# Patient Record
Sex: MALE | Race: BLACK OR AFRICAN AMERICAN | NOT HISPANIC OR LATINO | Employment: OTHER | URBAN - METROPOLITAN AREA
[De-identification: names, ages, dates, MRNs, and addresses within clinical notes are randomized per-mention and may not be internally consistent; named-entity substitution may affect disease eponyms.]

---

## 2020-01-07 ENCOUNTER — HISTORICAL (OUTPATIENT)
Dept: ADMINISTRATIVE | Facility: HOSPITAL | Age: 65
End: 2020-01-07

## 2020-01-12 LAB
FINAL CULTURE: NORMAL
FINAL CULTURE: NORMAL

## 2020-01-14 LAB
FINAL CULTURE: NORMAL
GRAM STN SPEC: NORMAL

## 2022-04-10 ENCOUNTER — HISTORICAL (OUTPATIENT)
Dept: ADMINISTRATIVE | Facility: HOSPITAL | Age: 67
End: 2022-04-10

## 2022-04-29 VITALS
HEIGHT: 73 IN | BODY MASS INDEX: 33.6 KG/M2 | SYSTOLIC BLOOD PRESSURE: 120 MMHG | DIASTOLIC BLOOD PRESSURE: 80 MMHG | WEIGHT: 253.5 LBS

## 2023-03-20 DIAGNOSIS — C34.90 SQUAMOUS CELL CARCINOMA OF LUNG: Primary | ICD-10-CM

## 2023-03-24 ENCOUNTER — DOCUMENTATION ONLY (OUTPATIENT)
Dept: HEMATOLOGY/ONCOLOGY | Facility: CLINIC | Age: 68
End: 2023-03-24
Payer: MEDICARE

## 2023-03-24 NOTE — NURSING
I spoke to Deondre regarding his appointment on Monday with Dr. Silver. He is aware of his arrival time and our location. I explained who I was and my role at [facility], located within [facility].

## 2023-03-27 ENCOUNTER — CLINICAL SUPPORT (OUTPATIENT)
Dept: HEMATOLOGY/ONCOLOGY | Facility: CLINIC | Age: 68
End: 2023-03-27
Payer: MEDICARE

## 2023-03-27 ENCOUNTER — OFFICE VISIT (OUTPATIENT)
Dept: HEMATOLOGY/ONCOLOGY | Facility: CLINIC | Age: 68
End: 2023-03-27
Payer: MEDICARE

## 2023-03-27 VITALS
SYSTOLIC BLOOD PRESSURE: 142 MMHG | WEIGHT: 217.63 LBS | TEMPERATURE: 98 F | DIASTOLIC BLOOD PRESSURE: 90 MMHG | HEART RATE: 64 BPM | BODY MASS INDEX: 28.84 KG/M2 | RESPIRATION RATE: 18 BRPM | HEIGHT: 73 IN | OXYGEN SATURATION: 98 %

## 2023-03-27 DIAGNOSIS — F17.200 SMOKER: ICD-10-CM

## 2023-03-27 DIAGNOSIS — C34.90 MALIGNANT NEOPLASM OF UNSPECIFIED PART OF UNSPECIFIED BRONCHUS OR LUNG: ICD-10-CM

## 2023-03-27 DIAGNOSIS — C34.90 SQUAMOUS CELL CARCINOMA OF LUNG: ICD-10-CM

## 2023-03-27 DIAGNOSIS — K76.9 LIVER LESION: ICD-10-CM

## 2023-03-27 DIAGNOSIS — C34.01 LUNG CANCER, MAIN BRONCHUS, RIGHT: Primary | ICD-10-CM

## 2023-03-27 LAB
ALBUMIN SERPL-MCNC: 3.4 G/DL (ref 3.4–4.8)
ALBUMIN/GLOB SERPL: 0.8 RATIO (ref 1.1–2)
ALP SERPL-CCNC: 70 UNIT/L (ref 40–150)
ALT SERPL-CCNC: 6 UNIT/L (ref 0–55)
AST SERPL-CCNC: 7 UNIT/L (ref 5–34)
BASOPHILS # BLD AUTO: 0.01 X10(3)/MCL (ref 0–0.2)
BASOPHILS NFR BLD AUTO: 0.2 %
BILIRUBIN DIRECT+TOT PNL SERPL-MCNC: 0.6 MG/DL
BUN SERPL-MCNC: 8.7 MG/DL (ref 8.4–25.7)
CALCIUM SERPL-MCNC: 9.8 MG/DL (ref 8.8–10)
CHLORIDE SERPL-SCNC: 106 MMOL/L (ref 98–107)
CO2 SERPL-SCNC: 29 MMOL/L (ref 23–31)
CREAT SERPL-MCNC: 0.84 MG/DL (ref 0.73–1.18)
EOSINOPHIL # BLD AUTO: 0.09 X10(3)/MCL (ref 0–0.9)
EOSINOPHIL NFR BLD AUTO: 1.4 %
ERYTHROCYTE [DISTWIDTH] IN BLOOD BY AUTOMATED COUNT: 13.3 % (ref 11.5–17)
GFR SERPLBLD CREATININE-BSD FMLA CKD-EPI: >60 MLS/MIN/1.73/M2
GLOBULIN SER-MCNC: 4.1 GM/DL (ref 2.4–3.5)
GLUCOSE SERPL-MCNC: 85 MG/DL (ref 82–115)
HCT VFR BLD AUTO: 40.8 % (ref 42–52)
HGB BLD-MCNC: 12.8 G/DL (ref 14–18)
IMM GRANULOCYTES # BLD AUTO: 0.01 X10(3)/MCL (ref 0–0.04)
IMM GRANULOCYTES NFR BLD AUTO: 0.2 %
LYMPHOCYTES # BLD AUTO: 1.68 X10(3)/MCL (ref 0.6–4.6)
LYMPHOCYTES NFR BLD AUTO: 25.6 %
MCH RBC QN AUTO: 30.4 PG (ref 27–31)
MCHC RBC AUTO-ENTMCNC: 31.4 G/DL (ref 33–36)
MCV RBC AUTO: 96.9 FL (ref 80–94)
MONOCYTES # BLD AUTO: 0.68 X10(3)/MCL (ref 0.1–1.3)
MONOCYTES NFR BLD AUTO: 10.4 %
NEUTROPHILS # BLD AUTO: 4.08 X10(3)/MCL (ref 2.1–9.2)
NEUTROPHILS NFR BLD AUTO: 62.2 %
PLATELET # BLD AUTO: 237 X10(3)/MCL (ref 130–400)
PMV BLD AUTO: 8.3 FL (ref 7.4–10.4)
POTASSIUM SERPL-SCNC: 4.7 MMOL/L (ref 3.5–5.1)
PROT SERPL-MCNC: 7.5 GM/DL (ref 5.8–7.6)
RBC # BLD AUTO: 4.21 X10(6)/MCL (ref 4.7–6.1)
SODIUM SERPL-SCNC: 140 MMOL/L (ref 136–145)
WBC # SPEC AUTO: 6.6 X10(3)/MCL (ref 4.5–11.5)

## 2023-03-27 PROCEDURE — 80053 COMPREHEN METABOLIC PANEL: CPT | Performed by: INTERNAL MEDICINE

## 2023-03-27 PROCEDURE — 1126F AMNT PAIN NOTED NONE PRSNT: CPT | Mod: CPTII,S$GLB,, | Performed by: INTERNAL MEDICINE

## 2023-03-27 PROCEDURE — 4010F ACE/ARB THERAPY RXD/TAKEN: CPT | Mod: CPTII,S$GLB,, | Performed by: INTERNAL MEDICINE

## 2023-03-27 PROCEDURE — 1101F PR PT FALLS ASSESS DOC 0-1 FALLS W/OUT INJ PAST YR: ICD-10-PCS | Mod: CPTII,S$GLB,, | Performed by: INTERNAL MEDICINE

## 2023-03-27 PROCEDURE — 3080F PR MOST RECENT DIASTOLIC BLOOD PRESSURE >= 90 MM HG: ICD-10-PCS | Mod: CPTII,S$GLB,, | Performed by: INTERNAL MEDICINE

## 2023-03-27 PROCEDURE — 1126F PR PAIN SEVERITY QUANTIFIED, NO PAIN PRESENT: ICD-10-PCS | Mod: CPTII,S$GLB,, | Performed by: INTERNAL MEDICINE

## 2023-03-27 PROCEDURE — 99205 PR OFFICE/OUTPT VISIT, NEW, LEVL V, 60-74 MIN: ICD-10-PCS | Mod: S$GLB,,, | Performed by: INTERNAL MEDICINE

## 2023-03-27 PROCEDURE — 3077F SYST BP >= 140 MM HG: CPT | Mod: CPTII,S$GLB,, | Performed by: INTERNAL MEDICINE

## 2023-03-27 PROCEDURE — 3080F DIAST BP >= 90 MM HG: CPT | Mod: CPTII,S$GLB,, | Performed by: INTERNAL MEDICINE

## 2023-03-27 PROCEDURE — 1159F MED LIST DOCD IN RCRD: CPT | Mod: CPTII,S$GLB,, | Performed by: INTERNAL MEDICINE

## 2023-03-27 PROCEDURE — 1101F PT FALLS ASSESS-DOCD LE1/YR: CPT | Mod: CPTII,S$GLB,, | Performed by: INTERNAL MEDICINE

## 2023-03-27 PROCEDURE — 3288F PR FALLS RISK ASSESSMENT DOCUMENTED: ICD-10-PCS | Mod: CPTII,S$GLB,, | Performed by: INTERNAL MEDICINE

## 2023-03-27 PROCEDURE — 3077F PR MOST RECENT SYSTOLIC BLOOD PRESSURE >= 140 MM HG: ICD-10-PCS | Mod: CPTII,S$GLB,, | Performed by: INTERNAL MEDICINE

## 2023-03-27 PROCEDURE — 99999 PR PBB SHADOW E&M-EST. PATIENT-LVL V: CPT | Mod: PBBFAC,,, | Performed by: INTERNAL MEDICINE

## 2023-03-27 PROCEDURE — 3288F FALL RISK ASSESSMENT DOCD: CPT | Mod: CPTII,S$GLB,, | Performed by: INTERNAL MEDICINE

## 2023-03-27 PROCEDURE — 85025 COMPLETE CBC W/AUTO DIFF WBC: CPT | Performed by: INTERNAL MEDICINE

## 2023-03-27 PROCEDURE — 36415 COLL VENOUS BLD VENIPUNCTURE: CPT | Performed by: INTERNAL MEDICINE

## 2023-03-27 PROCEDURE — 99999 PR PBB SHADOW E&M-EST. PATIENT-LVL V: ICD-10-PCS | Mod: PBBFAC,,, | Performed by: INTERNAL MEDICINE

## 2023-03-27 PROCEDURE — 3008F BODY MASS INDEX DOCD: CPT | Mod: CPTII,S$GLB,, | Performed by: INTERNAL MEDICINE

## 2023-03-27 PROCEDURE — 4010F PR ACE/ARB THEARPY RXD/TAKEN: ICD-10-PCS | Mod: CPTII,S$GLB,, | Performed by: INTERNAL MEDICINE

## 2023-03-27 PROCEDURE — 3008F PR BODY MASS INDEX (BMI) DOCUMENTED: ICD-10-PCS | Mod: CPTII,S$GLB,, | Performed by: INTERNAL MEDICINE

## 2023-03-27 PROCEDURE — 99205 OFFICE O/P NEW HI 60 MIN: CPT | Mod: S$GLB,,, | Performed by: INTERNAL MEDICINE

## 2023-03-27 PROCEDURE — 1159F PR MEDICATION LIST DOCUMENTED IN MEDICAL RECORD: ICD-10-PCS | Mod: CPTII,S$GLB,, | Performed by: INTERNAL MEDICINE

## 2023-03-27 RX ORDER — PRAVASTATIN SODIUM 20 MG/1
20 TABLET ORAL DAILY
COMMUNITY

## 2023-03-27 RX ORDER — METOPROLOL TARTRATE 50 MG/1
50 TABLET ORAL 2 TIMES DAILY
COMMUNITY

## 2023-03-27 RX ORDER — GLIMEPIRIDE 2 MG/1
2 TABLET ORAL
COMMUNITY

## 2023-03-27 RX ORDER — DULAGLUTIDE 1.5 MG/.5ML
INJECTION, SOLUTION SUBCUTANEOUS
COMMUNITY
Start: 2023-03-03

## 2023-03-27 RX ORDER — LOSARTAN POTASSIUM 100 MG/1
100 TABLET ORAL DAILY
COMMUNITY

## 2023-03-27 RX ORDER — ASPIRIN 81 MG/1
81 TABLET ORAL DAILY
COMMUNITY

## 2023-03-27 NOTE — NURSING
I met with Deondre and his wife following his appointment with Dr. Silver. We discussed the plan of care.I provided them with my contact information and explained my role at [facility].

## 2023-03-27 NOTE — PROGRESS NOTES
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  The mass abuts the right main pulmonary artery and right main stem bronchus.  Subcentimeter mediastinal lymph node noted.  One of LN slightly hypermetabolic with SUV of 3 and is located laterally.  Patchy hypermetabolism within the liver as suspected, however, there are few tiny focal areas of hypermetabolism out of proportion to the rest of the liver.  At least 1 of this corresponds to a low-density focus seen in the noncontrast exam.    Pathology:  02/10/2023:  Right upper lobe lung needle biopsy: Scattered highly atypical squamous cells, suspicious for squamous cell carcinoma in a background of abundant acute inflammation and necrosis  Right upper lobe, brushing:  Positive for malignant cells.  Squamous cell carcinoma, moderately differentiated.  Right upper lobe biopsy squamous cell carcinoma, moderately differentiated with focal keratinization.  Right lung washings positive for malignant cells.  Squamous cell carcinoma in a background of abundant necrosis.    CLINICAL HISTORY:       Patient: Deondre Ocampo IV is a 67 y.o. male.  Kindly referred for newly diagnosed squamous cell carcinoma of the lung.    He reports that he was being evaluated for a right side pain went imaging studies showed a mass in the lung.  I do not have the CTs.  But he eventually underwent bronchoscopy that showed squamous cell carcinoma.  PET-CT showed the right suprahilar mass with possible mediastinal lymphadenopathy and questionable liver lesions.  He is here to discuss further recommendations.    He is here  with his wife.  Patient has history of 53 pack year smoking.  He reports that his breathing is fine.  He denies any chest pain, short of breath or coughing.  No hemoptysis.  He has an appointment to see the radiation oncologist next week.    Chief Complaint: Referral from Dr. Hoff for Lung cancer      Interval History:        Past Medical History:   Diagnosis Date    Diabetes mellitus     High cholesterol     Hypertension     Lung cancer       Past Surgical History:   Procedure Laterality Date    BACK SURGERY      DEBRIDEMENT TENNIS ELBOW Right     right arm       Family History   Problem Relation Age of Onset    Coronary artery disease Mother     No Known Problems Father     Coronary artery disease Brother     Cancer Brother     Diabetes Brother     Heart failure Brother     Diabetes Brother     Leukemia Brother     Leukemia Daughter      Social Connections: Not on file       Review of patient's allergies indicates:   Allergen Reactions    Ace inhibitors Swelling    Penicillins Itching and Rash      Current Outpatient Medications on File Prior to Visit   Medication Sig Dispense Refill    aspirin (ECOTRIN) 81 MG EC tablet Take 81 mg by mouth once daily.      glimepiride (AMARYL) 2 MG tablet Take 2 mg by mouth before breakfast.      losartan (COZAAR) 100 MG tablet Take 100 mg by mouth once daily.      metoprolol tartrate (LOPRESSOR) 50 MG tablet Take 50 mg by mouth 2 (two) times daily.      pravastatin (PRAVACHOL) 20 MG tablet Take 20 mg by mouth once daily.      TRULICITY 1.5 mg/0.5 mL pen injector SMARTSI pre-filled pen syringe SUB-Q Once a Week       No current facility-administered medications on file prior to visit.      Review of Systems   Constitutional:  Negative for activity change, appetite change, chills, diaphoresis, fatigue, fever and unexpected weight change.   HENT:  Negative for nasal congestion, mouth sores, nosebleeds, sinus pressure/congestion, sore throat and trouble swallowing.    Eyes:  "Negative.    Respiratory:  Negative for cough and shortness of breath.    Cardiovascular:  Negative for chest pain and palpitations.   Gastrointestinal:  Negative for abdominal distention, abdominal pain, blood in stool, change in bowel habit, constipation, diarrhea, nausea, vomiting and change in bowel habit.   Endocrine: Negative.    Genitourinary:  Negative for bladder incontinence, decreased urine volume, difficulty urinating, dysuria, frequency, hematuria and urgency.   Musculoskeletal:  Negative for arthralgias, back pain, gait problem, joint swelling, leg pain and myalgias.   Integumentary:  Negative for rash.   Allergic/Immunologic: Negative.    Neurological:  Negative for dizziness, tremors, syncope, weakness, light-headedness, numbness, headaches and memory loss.   Hematological:  Negative for adenopathy. Does not bruise/bleed easily.   Psychiatric/Behavioral:  Negative for agitation, confusion, hallucinations, sleep disturbance and suicidal ideas. The patient is not nervous/anxious.             Vitals:    03/27/23 1345   BP: (!) 142/90   BP Location: Left arm   Patient Position: Sitting   BP Method: Medium (Automatic)   Pulse: 64   Resp: 18   Temp: 98.2 °F (36.8 °C)   TempSrc: Oral   SpO2: 98%   Weight: 98.7 kg (217 lb 9.6 oz)   Height: 6' 1" (1.854 m)      Body mass index is 28.71 kg/m².  Body surface area is 2.25 meters squared.  Physical Exam  Vitals and nursing note reviewed.   Constitutional:       General: He is not in acute distress.     Appearance: Normal appearance.   HENT:      Head: Normocephalic and atraumatic.      Mouth/Throat:      Mouth: Mucous membranes are moist.   Eyes:      General: No scleral icterus.     Extraocular Movements: Extraocular movements intact.      Conjunctiva/sclera: Conjunctivae normal.   Neck:      Vascular: No JVD.   Cardiovascular:      Rate and Rhythm: Normal rate and regular rhythm.      Heart sounds: No murmur heard.  Pulmonary:      Effort: Pulmonary effort is " normal.      Breath sounds: Decreased breath sounds present. No wheezing or rhonchi.   Abdominal:      General: Bowel sounds are normal. There is no distension.      Palpations: Abdomen is soft.      Tenderness: There is no abdominal tenderness.   Musculoskeletal:         General: No swelling or deformity.      Cervical back: Neck supple.   Lymphadenopathy:      Head:      Right side of head: No submandibular adenopathy.      Left side of head: No submandibular adenopathy.      Cervical: No cervical adenopathy.      Upper Body:      Right upper body: No supraclavicular or axillary adenopathy.      Left upper body: No supraclavicular or axillary adenopathy.      Lower Body: No right inguinal adenopathy. No left inguinal adenopathy.   Skin:     General: Skin is warm.      Coloration: Skin is not jaundiced.      Findings: No rash.   Neurological:      General: No focal deficit present.      Mental Status: He is alert and oriented to person, place, and time.      Cranial Nerves: Cranial nerves 2-12 are intact.   Psychiatric:         Attention and Perception: Attention normal.         Behavior: Behavior is cooperative.     ECOG SCORE    1 - Restricted in strenuous activity-ambulatory and able to carry out work of a light nature         Laboratory:  CBC with Differential:  No results found for: WBC, RBC, HGB, HCT, MCV, MCH, MCHC, RDW, PLT, MPV, GRAN, LYMPH, MONO, EOS, BASO, EOSINOPHIL, BASOPHIL     CMP:  No results found for: NA, K, CL, CO2, GLU, BUN, CREATININE, CALCIUM, PROT, ALBUMIN, BILITOT, ALKPHOS, AST, ALT, ANIONGAP, ESTGFRAFRICA, EGFRNONAA          Assessment:       1. Lung cancer, main bronchus, right    2. Liver lesion    3. Squamous cell carcinoma of lung              Plan:       Discussed with the patient and his wife since diagnosis, prognosis and treatment recommendations.  At this time staging unclear but seems that he has advanced disease.  Mass 4.7 cm, possible mediastinal lymphadenopathy and questionable  liver hypodensities.  Discussed with the patient chemotherapy, chemoradiation risks versus benefits as well as toxicities associated with it.  Patient is willing to proceed with treatment as recommended.  He has an appointment next week with radiation oncologist.  He has not seen the surgeon though.  He will refer to CT surgeon for surgical evaluation and more tissue diagnosis and molecular markers.   Plan: Carbo/Taxol weekly with RT    Keep appointment with radiation oncologist April 4th  Will refer to Cardiothoracic surgeon for evaluation of mediastinal LN and possible VATS and to send tissue for molecular testing and Mediport placement  MRI liver to eval questionable liver hypodensities  MRI brain to complete staging  Valium 2 mg 30 min prior to MRI will be prescribed as patient claustrophobic   RTC 2 weeks-after all above for more definite recommendations.  Smoking cessation counseling given.   The patient was seen, interviewed and examined. Pertinent lab and radiology studies were reviewed.   The patient was given ample opportunity to ask questions, and to the best of my abilities, all questions answered to satisfaction; patient demonstrated understanding of what we discussed and agreeable to the plan. Pt instructed to call should develop concerning signs/symptoms or have further questions.     I'd like to thank for referring and allowing me the opportunity to participate in the care of this patient and if any questions, please do not hesitate to call the office at [phone].         Letha Silver MD  Hematology/Oncology

## 2023-04-05 ENCOUNTER — TELEPHONE (OUTPATIENT)
Dept: HEMATOLOGY/ONCOLOGY | Facility: CLINIC | Age: 68
End: 2023-04-05
Payer: MEDICARE

## 2023-04-05 DIAGNOSIS — C34.01 LUNG CANCER, MAIN BRONCHUS, RIGHT: Primary | ICD-10-CM

## 2023-04-05 RX ORDER — LORAZEPAM 0.5 MG/1
0.5 TABLET ORAL ONCE
Qty: 2 TABLET | Refills: 0 | Status: SHIPPED | OUTPATIENT
Start: 2023-04-05 | End: 2023-04-19

## 2023-04-05 NOTE — TELEPHONE ENCOUNTER
Leticia -     Never mind. I just received a call from [facility] imaging. They are not able to perform MRI due to TENS unit. The radiologist asked that alternative tests be ordered.

## 2023-04-06 DIAGNOSIS — C34.90 SQUAMOUS CELL CARCINOMA OF LUNG, UNSPECIFIED LATERALITY: Primary | ICD-10-CM

## 2023-04-10 ENCOUNTER — APPOINTMENT (OUTPATIENT)
Dept: RADIATION THERAPY | Facility: HOSPITAL | Age: 68
End: 2023-04-10
Attending: RADIOLOGY
Payer: MEDICARE

## 2023-04-10 DIAGNOSIS — C34.01 LUNG CANCER, MAIN BRONCHUS, RIGHT: ICD-10-CM

## 2023-04-10 DIAGNOSIS — K76.9 LIVER LESION: ICD-10-CM

## 2023-04-10 DIAGNOSIS — C34.91 SQUAMOUS CELL CARCINOMA OF RIGHT LUNG: Primary | ICD-10-CM

## 2023-04-10 PROCEDURE — 77334 RADIATION TREATMENT AID(S): CPT | Performed by: RADIOLOGY

## 2023-04-17 ENCOUNTER — TELEPHONE (OUTPATIENT)
Dept: HEMATOLOGY/ONCOLOGY | Facility: CLINIC | Age: 68
End: 2023-04-17
Payer: MEDICARE

## 2023-04-17 NOTE — TELEPHONE ENCOUNTER
I cancelled patient's appointment this week with Dr. Silver. Looks like she put in recent orders for CT scans. Please message schedulers to reschedule visit and lab once the Ct scan appointments are made.    Thank you, Ladies.

## 2023-04-19 ENCOUNTER — OFFICE VISIT (OUTPATIENT)
Dept: CARDIAC SURGERY | Facility: CLINIC | Age: 68
End: 2023-04-19
Payer: MEDICARE

## 2023-04-19 VITALS
BODY MASS INDEX: 28.76 KG/M2 | HEIGHT: 73 IN | HEART RATE: 69 BPM | WEIGHT: 217 LBS | SYSTOLIC BLOOD PRESSURE: 154 MMHG | DIASTOLIC BLOOD PRESSURE: 98 MMHG

## 2023-04-19 DIAGNOSIS — C34.91 SQUAMOUS CELL CARCINOMA OF RIGHT LUNG: ICD-10-CM

## 2023-04-19 PROCEDURE — 3288F FALL RISK ASSESSMENT DOCD: CPT | Mod: CPTII,,, | Performed by: THORACIC SURGERY (CARDIOTHORACIC VASCULAR SURGERY)

## 2023-04-19 PROCEDURE — 3008F BODY MASS INDEX DOCD: CPT | Mod: CPTII,,, | Performed by: THORACIC SURGERY (CARDIOTHORACIC VASCULAR SURGERY)

## 2023-04-19 PROCEDURE — 3288F PR FALLS RISK ASSESSMENT DOCUMENTED: ICD-10-PCS | Mod: CPTII,,, | Performed by: THORACIC SURGERY (CARDIOTHORACIC VASCULAR SURGERY)

## 2023-04-19 PROCEDURE — 3080F DIAST BP >= 90 MM HG: CPT | Mod: CPTII,,, | Performed by: THORACIC SURGERY (CARDIOTHORACIC VASCULAR SURGERY)

## 2023-04-19 PROCEDURE — 1160F PR REVIEW ALL MEDS BY PRESCRIBER/CLIN PHARMACIST DOCUMENTED: ICD-10-PCS | Mod: CPTII,,, | Performed by: THORACIC SURGERY (CARDIOTHORACIC VASCULAR SURGERY)

## 2023-04-19 PROCEDURE — 1159F PR MEDICATION LIST DOCUMENTED IN MEDICAL RECORD: ICD-10-PCS | Mod: CPTII,,, | Performed by: THORACIC SURGERY (CARDIOTHORACIC VASCULAR SURGERY)

## 2023-04-19 PROCEDURE — 3008F PR BODY MASS INDEX (BMI) DOCUMENTED: ICD-10-PCS | Mod: CPTII,,, | Performed by: THORACIC SURGERY (CARDIOTHORACIC VASCULAR SURGERY)

## 2023-04-19 PROCEDURE — 3080F PR MOST RECENT DIASTOLIC BLOOD PRESSURE >= 90 MM HG: ICD-10-PCS | Mod: CPTII,,, | Performed by: THORACIC SURGERY (CARDIOTHORACIC VASCULAR SURGERY)

## 2023-04-19 PROCEDURE — 1160F RVW MEDS BY RX/DR IN RCRD: CPT | Mod: CPTII,,, | Performed by: THORACIC SURGERY (CARDIOTHORACIC VASCULAR SURGERY)

## 2023-04-19 PROCEDURE — 1159F MED LIST DOCD IN RCRD: CPT | Mod: CPTII,,, | Performed by: THORACIC SURGERY (CARDIOTHORACIC VASCULAR SURGERY)

## 2023-04-19 PROCEDURE — 1101F PT FALLS ASSESS-DOCD LE1/YR: CPT | Mod: CPTII,,, | Performed by: THORACIC SURGERY (CARDIOTHORACIC VASCULAR SURGERY)

## 2023-04-19 PROCEDURE — 3077F PR MOST RECENT SYSTOLIC BLOOD PRESSURE >= 140 MM HG: ICD-10-PCS | Mod: CPTII,,, | Performed by: THORACIC SURGERY (CARDIOTHORACIC VASCULAR SURGERY)

## 2023-04-19 PROCEDURE — 99204 PR OFFICE/OUTPT VISIT, NEW, LEVL IV, 45-59 MIN: ICD-10-PCS | Mod: ,,, | Performed by: THORACIC SURGERY (CARDIOTHORACIC VASCULAR SURGERY)

## 2023-04-19 PROCEDURE — 99204 OFFICE O/P NEW MOD 45 MIN: CPT | Mod: ,,, | Performed by: THORACIC SURGERY (CARDIOTHORACIC VASCULAR SURGERY)

## 2023-04-19 PROCEDURE — 1101F PR PT FALLS ASSESS DOC 0-1 FALLS W/OUT INJ PAST YR: ICD-10-PCS | Mod: CPTII,,, | Performed by: THORACIC SURGERY (CARDIOTHORACIC VASCULAR SURGERY)

## 2023-04-19 PROCEDURE — 3077F SYST BP >= 140 MM HG: CPT | Mod: CPTII,,, | Performed by: THORACIC SURGERY (CARDIOTHORACIC VASCULAR SURGERY)

## 2023-04-19 PROCEDURE — 4010F PR ACE/ARB THEARPY RXD/TAKEN: ICD-10-PCS | Mod: CPTII,,, | Performed by: THORACIC SURGERY (CARDIOTHORACIC VASCULAR SURGERY)

## 2023-04-19 PROCEDURE — 4010F ACE/ARB THERAPY RXD/TAKEN: CPT | Mod: CPTII,,, | Performed by: THORACIC SURGERY (CARDIOTHORACIC VASCULAR SURGERY)

## 2023-04-19 NOTE — PROGRESS NOTES
History & Physical    SUBJECTIVE:     History of Present Illness:  The patient is presenting for evaluation for right lung mass with diagnosis of squamous cell carcinoma.  He has been evaluated with a PET scan and a CT scan.  Findings are compatible on the PET scan of T2 N2 lung tumor.  He did have some uptake in the liver that were thought to be cysts versus possible metastatic disease.  He is here for possible intervention for more tissues for genetic testing.      Chief Complaint   Patient presents with    Pre-op Exam      RE: Squamous Cell Ca       Review of patient's allergies indicates:   Allergen Reactions    Ace inhibitors Swelling    Penicillins Itching and Rash       Current Outpatient Medications   Medication Sig Dispense Refill    aspirin (ECOTRIN) 81 MG EC tablet Take 81 mg by mouth once daily.      glimepiride (AMARYL) 2 MG tablet Take 2 mg by mouth before breakfast.      losartan (COZAAR) 100 MG tablet Take 100 mg by mouth once daily.      metoprolol tartrate (LOPRESSOR) 50 MG tablet Take 50 mg by mouth 2 (two) times daily.      pravastatin (PRAVACHOL) 20 MG tablet Take 20 mg by mouth once daily.      TRULICITY 1.5 mg/0.5 mL pen injector SMARTSI pre-filled pen syringe SUB-Q Once a Week       No current facility-administered medications for this visit.       Past Medical History:   Diagnosis Date    Diabetes mellitus     High cholesterol     Hypertension     Lung cancer      Past Surgical History:   Procedure Laterality Date    BACK SURGERY      DEBRIDEMENT TENNIS ELBOW Right     right arm       Family History   Problem Relation Age of Onset    Coronary artery disease Mother     No Known Problems Father     Coronary artery disease Brother     Cancer Brother     Diabetes Brother     Heart failure Brother     Diabetes Brother     Leukemia Brother     Leukemia Daughter      Social History     Tobacco Use    Smoking status: Former     Years: 53.00     Types: Cigarettes     Start date:       "Quit date: 2023     Years since quittin.1    Smokeless tobacco: Never   Substance Use Topics    Alcohol use: Yes     Comment: ocassional    Drug use: Never        Review of Systems:  Review of Systems   Constitutional: Negative.    HENT: Negative.     Eyes: Negative.    Respiratory: Negative.     Cardiovascular: Negative.    Gastrointestinal: Negative.    Endocrine: Negative.    Genitourinary: Negative.    Musculoskeletal: Negative.         Claudications   Skin: Negative.    Allergic/Immunologic: Negative.    Neurological: Negative.    Hematological: Negative.    Psychiatric/Behavioral: Negative.       OBJECTIVE:     Vital Signs (Most Recent)  Pulse: 69 (23 0945)  BP: (!) 154/98 (23 0945)  6' 1" (1.854 m)  98.4 kg (217 lb)     Physical Exam:  Physical Exam  Vitals reviewed.   Constitutional:       Appearance: Normal appearance.   HENT:      Head: Normocephalic and atraumatic.      Nose: Nose normal.      Mouth/Throat:      Mouth: Mucous membranes are dry.      Pharynx: Oropharynx is clear.   Eyes:      Extraocular Movements: Extraocular movements intact.      Conjunctiva/sclera: Conjunctivae normal.      Pupils: Pupils are equal, round, and reactive to light.   Cardiovascular:      Rate and Rhythm: Normal rate and regular rhythm.      Pulses: Normal pulses.   Pulmonary:      Effort: Pulmonary effort is normal.      Breath sounds: Normal breath sounds.   Abdominal:      General: Abdomen is flat.      Palpations: Abdomen is soft.   Musculoskeletal:         General: Normal range of motion.      Cervical back: Neck supple.   Skin:     General: Skin is warm and dry.   Neurological:      General: No focal deficit present.   Psychiatric:         Mood and Affect: Mood normal.       Laboratory:  None      Diagnostic Results:  CT PET was reviewed.      ASSESSMENT/PLAN:     Squamous cell carcinoma of the lung.  The patient might be still surgical and can be treated with a right pneumonectomy as I believe his " nodes are more likely to be N1 rather than N2.  I plan to get a CT scan of the chest for further evaluation.  Of course we will be left with a question of the hepatic finding and we will need to rule out Mets prior to any major planning.  I will discuss this case with Dr. Silver.  I will get pulmonary function tests and cardiac clearance.

## 2023-04-20 ENCOUNTER — PROCEDURE VISIT (OUTPATIENT)
Dept: RESPIRATORY THERAPY | Facility: HOSPITAL | Age: 68
End: 2023-04-20
Attending: THORACIC SURGERY (CARDIOTHORACIC VASCULAR SURGERY)
Payer: MEDICARE

## 2023-04-20 VITALS — HEART RATE: 87 BPM | OXYGEN SATURATION: 97 %

## 2023-04-20 DIAGNOSIS — C34.91 SQUAMOUS CELL CARCINOMA OF RIGHT LUNG: Primary | ICD-10-CM

## 2023-04-20 PROCEDURE — 94761 N-INVAS EAR/PLS OXIMETRY MLT: CPT

## 2023-04-20 PROCEDURE — 94727 GAS DIL/WSHOT DETER LNG VOL: CPT

## 2023-04-20 PROCEDURE — 94060 EVALUATION OF WHEEZING: CPT

## 2023-04-20 PROCEDURE — 25000242 PHARM REV CODE 250 ALT 637 W/ HCPCS: Performed by: INTERNAL MEDICINE

## 2023-04-20 PROCEDURE — 94729 DIFFUSING CAPACITY: CPT

## 2023-04-20 RX ORDER — ALBUTEROL SULFATE 0.83 MG/ML
2.5 SOLUTION RESPIRATORY (INHALATION)
Status: COMPLETED | OUTPATIENT
Start: 2023-04-20 | End: 2023-04-20

## 2023-04-20 RX ADMIN — ALBUTEROL SULFATE 2.5 MG: 2.5 SOLUTION RESPIRATORY (INHALATION) at 01:04

## 2023-04-25 ENCOUNTER — PATIENT MESSAGE (OUTPATIENT)
Dept: RESEARCH | Facility: HOSPITAL | Age: 68
End: 2023-04-25
Payer: MEDICARE

## 2023-05-02 ENCOUNTER — PATIENT MESSAGE (OUTPATIENT)
Dept: RESEARCH | Facility: HOSPITAL | Age: 68
End: 2023-05-02
Payer: MEDICARE

## 2023-05-12 ENCOUNTER — HOSPITAL ENCOUNTER (OUTPATIENT)
Dept: RADIOLOGY | Facility: HOSPITAL | Age: 68
Discharge: HOME | End: 2023-05-12
Attending: THORACIC SURGERY (CARDIOTHORACIC VASCULAR SURGERY)
Payer: MEDICARE

## 2023-05-12 DIAGNOSIS — C34.91 SQUAMOUS CELL CARCINOMA OF RIGHT LUNG: ICD-10-CM

## 2023-05-12 LAB
CREAT SERPL-MCNC: 0.8 MG/DL (ref 0.5–1.4)
SAMPLE: NORMAL

## 2023-05-12 PROCEDURE — 71260 CT THORAX DX C+: CPT | Mod: TC

## 2023-05-12 PROCEDURE — 25500020 PHARM REV CODE 255: Performed by: THORACIC SURGERY (CARDIOTHORACIC VASCULAR SURGERY)

## 2023-05-12 PROCEDURE — 74177 CT ABD & PELVIS W/CONTRAST: CPT | Mod: TC

## 2023-05-12 RX ADMIN — IOPAMIDOL 100 ML: 755 INJECTION, SOLUTION INTRAVENOUS at 08:05

## 2023-05-16 ENCOUNTER — PATIENT MESSAGE (OUTPATIENT)
Dept: RESEARCH | Facility: HOSPITAL | Age: 68
End: 2023-05-16
Payer: MEDICARE

## 2023-05-18 ENCOUNTER — TELEPHONE (OUTPATIENT)
Dept: CARDIAC SURGERY | Facility: CLINIC | Age: 68
End: 2023-05-18
Payer: MEDICARE

## 2023-05-18 NOTE — TELEPHONE ENCOUNTER
Informed spouse that Dr. Chand is out this week and will be back next week to review testing results and I will call back then.  Verb understanding.   ----- Message from Chica Cardenas sent at 5/18/2023  2:03 PM CDT -----  Pt's wife called wanting to know if we got back the test results for her .    Jeannine(wife) [phone]

## 2023-05-19 ENCOUNTER — TELEPHONE (OUTPATIENT)
Dept: HEMATOLOGY/ONCOLOGY | Facility: CLINIC | Age: 68
End: 2023-05-19
Payer: MEDICARE

## 2023-05-19 NOTE — TELEPHONE ENCOUNTER
Candy with Dr. Rushing's office calling to check on status of this patient. They are waiting for tx plan so that they can get started with XRT. He recently had CT scans, but no f/u visit.

## 2023-05-19 NOTE — TELEPHONE ENCOUNTER
SPOKE WITH URIEL AND INFORMED HER OF HIS APPT ON 5/24 WITH MD AND ADVISED HER I'D CALL HER WITH MD DECIDES, SHE STATED UNDERSTANDING.

## 2023-05-24 ENCOUNTER — OFFICE VISIT (OUTPATIENT)
Dept: HEMATOLOGY/ONCOLOGY | Facility: CLINIC | Age: 68
End: 2023-05-24
Payer: MEDICARE

## 2023-05-24 VITALS
TEMPERATURE: 98 F | DIASTOLIC BLOOD PRESSURE: 93 MMHG | HEIGHT: 73 IN | SYSTOLIC BLOOD PRESSURE: 143 MMHG | HEART RATE: 69 BPM | OXYGEN SATURATION: 100 % | BODY MASS INDEX: 28.63 KG/M2 | WEIGHT: 216 LBS

## 2023-05-24 DIAGNOSIS — K76.9 LIVER LESION: ICD-10-CM

## 2023-05-24 DIAGNOSIS — M54.2 NECK PAIN: ICD-10-CM

## 2023-05-24 DIAGNOSIS — C34.01 LUNG CANCER, MAIN BRONCHUS, RIGHT: Primary | ICD-10-CM

## 2023-05-24 PROCEDURE — 4010F ACE/ARB THERAPY RXD/TAKEN: CPT | Mod: CPTII,S$GLB,, | Performed by: INTERNAL MEDICINE

## 2023-05-24 PROCEDURE — 1125F PR PAIN SEVERITY QUANTIFIED, PAIN PRESENT: ICD-10-PCS | Mod: CPTII,S$GLB,, | Performed by: INTERNAL MEDICINE

## 2023-05-24 PROCEDURE — 1101F PT FALLS ASSESS-DOCD LE1/YR: CPT | Mod: CPTII,S$GLB,, | Performed by: INTERNAL MEDICINE

## 2023-05-24 PROCEDURE — 1159F MED LIST DOCD IN RCRD: CPT | Mod: CPTII,S$GLB,, | Performed by: INTERNAL MEDICINE

## 2023-05-24 PROCEDURE — 3080F DIAST BP >= 90 MM HG: CPT | Mod: CPTII,S$GLB,, | Performed by: INTERNAL MEDICINE

## 2023-05-24 PROCEDURE — 3008F PR BODY MASS INDEX (BMI) DOCUMENTED: ICD-10-PCS | Mod: CPTII,S$GLB,, | Performed by: INTERNAL MEDICINE

## 2023-05-24 PROCEDURE — 3288F PR FALLS RISK ASSESSMENT DOCUMENTED: ICD-10-PCS | Mod: CPTII,S$GLB,, | Performed by: INTERNAL MEDICINE

## 2023-05-24 PROCEDURE — 1101F PR PT FALLS ASSESS DOC 0-1 FALLS W/OUT INJ PAST YR: ICD-10-PCS | Mod: CPTII,S$GLB,, | Performed by: INTERNAL MEDICINE

## 2023-05-24 PROCEDURE — 99999 PR PBB SHADOW E&M-EST. PATIENT-LVL IV: ICD-10-PCS | Mod: PBBFAC,,, | Performed by: INTERNAL MEDICINE

## 2023-05-24 PROCEDURE — 4010F PR ACE/ARB THEARPY RXD/TAKEN: ICD-10-PCS | Mod: CPTII,S$GLB,, | Performed by: INTERNAL MEDICINE

## 2023-05-24 PROCEDURE — 99215 PR OFFICE/OUTPT VISIT, EST, LEVL V, 40-54 MIN: ICD-10-PCS | Mod: S$GLB,,, | Performed by: INTERNAL MEDICINE

## 2023-05-24 PROCEDURE — 1159F PR MEDICATION LIST DOCUMENTED IN MEDICAL RECORD: ICD-10-PCS | Mod: CPTII,S$GLB,, | Performed by: INTERNAL MEDICINE

## 2023-05-24 PROCEDURE — 1125F AMNT PAIN NOTED PAIN PRSNT: CPT | Mod: CPTII,S$GLB,, | Performed by: INTERNAL MEDICINE

## 2023-05-24 PROCEDURE — 3008F BODY MASS INDEX DOCD: CPT | Mod: CPTII,S$GLB,, | Performed by: INTERNAL MEDICINE

## 2023-05-24 PROCEDURE — 3288F FALL RISK ASSESSMENT DOCD: CPT | Mod: CPTII,S$GLB,, | Performed by: INTERNAL MEDICINE

## 2023-05-24 PROCEDURE — 99999 PR PBB SHADOW E&M-EST. PATIENT-LVL IV: CPT | Mod: PBBFAC,,, | Performed by: INTERNAL MEDICINE

## 2023-05-24 PROCEDURE — 3080F PR MOST RECENT DIASTOLIC BLOOD PRESSURE >= 90 MM HG: ICD-10-PCS | Mod: CPTII,S$GLB,, | Performed by: INTERNAL MEDICINE

## 2023-05-24 PROCEDURE — 3077F SYST BP >= 140 MM HG: CPT | Mod: CPTII,S$GLB,, | Performed by: INTERNAL MEDICINE

## 2023-05-24 PROCEDURE — 3077F PR MOST RECENT SYSTOLIC BLOOD PRESSURE >= 140 MM HG: ICD-10-PCS | Mod: CPTII,S$GLB,, | Performed by: INTERNAL MEDICINE

## 2023-05-24 PROCEDURE — 99215 OFFICE O/P EST HI 40 MIN: CPT | Mod: S$GLB,,, | Performed by: INTERNAL MEDICINE

## 2023-05-24 RX ORDER — SODIUM CHLORIDE 0.9 % (FLUSH) 0.9 %
10 SYRINGE (ML) INJECTION
Status: CANCELLED | OUTPATIENT
Start: 2023-05-31

## 2023-05-24 RX ORDER — EPINEPHRINE 0.3 MG/.3ML
0.3 INJECTION SUBCUTANEOUS ONCE AS NEEDED
Status: CANCELLED | OUTPATIENT
Start: 2023-05-31

## 2023-05-24 RX ORDER — FAMOTIDINE 10 MG/ML
20 INJECTION INTRAVENOUS
Status: CANCELLED | OUTPATIENT
Start: 2023-05-31

## 2023-05-24 RX ORDER — HEPARIN 100 UNIT/ML
500 SYRINGE INTRAVENOUS
Status: CANCELLED | OUTPATIENT
Start: 2023-05-31

## 2023-05-24 RX ORDER — DIPHENHYDRAMINE HYDROCHLORIDE 50 MG/ML
50 INJECTION INTRAMUSCULAR; INTRAVENOUS ONCE AS NEEDED
Status: CANCELLED | OUTPATIENT
Start: 2023-05-31

## 2023-05-24 RX ORDER — DICYCLOMINE HYDROCHLORIDE 20 MG/1
20 TABLET ORAL DAILY
COMMUNITY

## 2023-05-24 NOTE — PROGRESS NOTES
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  The mass abuts the right main pulmonary artery and right main stem bronchus.  Subcentimeter mediastinal lymph node noted.  One of LN slightly hypermetabolic with SUV of 3 and is located laterally.  Patchy hypermetabolism within the liver as suspected, however, there are few tiny focal areas of hypermetabolism out of proportion to the rest of the liver.  At least 1 of this corresponds to a low-density focus seen in the noncontrast exam.  CT 5/12/2023: changes consistent with COPD and emphysema in the lungs bilaterally with multiple bulla bleb seen. mass seen in the right hilum extending into the right upper lobe.  It is somewhat infiltrative.  This was seen on the prior PET-CT as well.  The findings are not significantly changed.  There are multiple associated satellite nodules in the right upper lobe.  Rough dimensions of the hilar component of the mass is 5.4 x 5.1 cm and rough measurements of the dominant lesion in the right upper lobe is 11 cm x 3.6 cm.  The mass extends into the right mainstem and right upper lobe bronchus.  This was seen on the prior examination as well.  There is narrowing of the right upper lobe bronchus there is some narrowing of the right upper lobe pulmonary arteries.  Findings are similar to the prior examination.  Scattered areas of punctate subcentimeter nodularity is seen in the right lower lobe.  These are too small to characterize and similar changes were seen previously.    No  pleural effusion is seen.  Thoracic aorta is normal in caliber.  There is some mediastinal lymphadenopathy seen.  Reference lymph node is measured in the precarinal region on image number 39 series 2 at 1.5 x 0.9 cm.  This is similar to the prior examination.  Scattered punctate areas of hypoattenuation are seen throughout the liver.  Similar changes were seen previously.  The lesions are too small to characterize but may represent cysts.  The largest lesion is seen in segment 4.  It does not enhance and measures 2 cm x 1.5 cm.  Findings may represent a cyst.  No adrenal nodules are seen.      Pathology:  02/10/2023:  Right upper lobe lung needle biopsy: Scattered highly atypical squamous cells, suspicious for squamous cell carcinoma in a background of abundant acute inflammation and necrosis  Right upper lobe, brushing:  Positive for malignant cells.  Squamous cell carcinoma, moderately differentiated.  Right upper lobe biopsy squamous cell carcinoma, moderately differentiated with focal keratinization.  Right lung washings positive for malignant cells.  Squamous cell carcinoma in a background of abundant necrosis.    CLINICAL HISTORY:       Patient: Deondre Ocampo IV is a 67 y.o. male.  Kindly referred for newly diagnosed squamous cell carcinoma of the lung.    He reports that he was being evaluated for a right side pain went imaging studies showed a mass in the lung.  I do not have the CTs.  But he eventually underwent bronchoscopy that showed squamous cell carcinoma.  PET-CT showed the right suprahilar mass with possible mediastinal lymphadenopathy and questionable liver lesions.  He is here to discuss further recommendations.    He is here with his wife.  Patient has history of 53 pack year smoking.  He reports that his breathing is fine.  He denies any chest pain, short of breath or coughing.  No hemoptysis.  He has an appointment to see the radiation oncologist next week.    Chief Complaint: OTHER (PT  states that he can't turn his neck.)      Interval History:  Patient presents today for follow up to discuss CT scan results, he is with his wife and daughter is joined by phone. He was evaluated by Dr. Chand on 23, he ordered CT scan. He discussed case with me personally and believes lung mass can be removed. I would like to speak with Dr. Rushing to discuss XRT. He was evaluated in ER last week for abdominal pain and prescribed Bentyl daily. He c/o of neck pain today, described as a shooting pain especially when trying to turn head. . Otherwise, he is doing okay. He is willing to start treatment.        Past Medical History:   Diagnosis Date    Diabetes mellitus     High cholesterol     Hypertension     Lung cancer       Past Surgical History:   Procedure Laterality Date    BACK SURGERY      DEBRIDEMENT TENNIS ELBOW Right     right arm       Family History   Problem Relation Age of Onset    Coronary artery disease Mother     No Known Problems Father     Coronary artery disease Brother     Cancer Brother     Diabetes Brother     Heart failure Brother     Diabetes Brother     Leukemia Brother     Leukemia Daughter      Social Connections: Not on file       Review of patient's allergies indicates:   Allergen Reactions    Ace inhibitors Swelling    Penicillins Itching and Rash      Current Outpatient Medications on File Prior to Visit   Medication Sig Dispense Refill    aspirin (ECOTRIN) 81 MG EC tablet Take 81 mg by mouth once daily.      dicyclomine (BENTYL) 20 mg tablet Take 20 mg by mouth once daily. Once daily in the AM.      glimepiride (AMARYL) 2 MG tablet Take 2 mg by mouth before breakfast.      losartan (COZAAR) 100 MG tablet Take 100 mg by mouth once daily.      metoprolol tartrate (LOPRESSOR) 50 MG tablet Take 50 mg by mouth 2 (two) times daily.      pravastatin (PRAVACHOL) 20 MG tablet Take 20 mg by mouth once daily.      TRULICITY 1.5 mg/0.5 mL pen injector SMARTSI pre-filled pen syringe SUB-Q  "Once a Week       No current facility-administered medications on file prior to visit.      Review of Systems   Constitutional:  Negative for activity change, appetite change, chills, diaphoresis, fatigue, fever and unexpected weight change.   HENT:  Negative for nasal congestion, mouth sores, nosebleeds, sinus pressure/congestion, sore throat and trouble swallowing.    Eyes: Negative.    Respiratory:  Negative for cough and shortness of breath.    Cardiovascular:  Negative for chest pain and palpitations.   Gastrointestinal:  Negative for abdominal distention, abdominal pain, blood in stool, change in bowel habit, constipation, diarrhea, nausea, vomiting and change in bowel habit.   Endocrine: Negative.    Genitourinary:  Negative for bladder incontinence, decreased urine volume, difficulty urinating, dysuria, frequency, hematuria and urgency.   Musculoskeletal:  Positive for back pain and neck pain. Negative for arthralgias, gait problem, joint swelling, leg pain and myalgias.   Integumentary:  Negative for rash.   Allergic/Immunologic: Negative.    Neurological:  Negative for dizziness, tremors, syncope, weakness, light-headedness, numbness, headaches and memory loss.   Hematological:  Negative for adenopathy. Does not bruise/bleed easily.   Psychiatric/Behavioral:  Negative for agitation, confusion, hallucinations, sleep disturbance and suicidal ideas. The patient is not nervous/anxious.             Vitals:    05/24/23 1505   BP: (!) 143/93   BP Location: Left arm   Patient Position: Sitting   Pulse: 69   Temp: 98 °F (36.7 °C)   TempSrc: Oral   SpO2: 100%   Weight: 98 kg (216 lb)   Height: 6' 1" (1.854 m)        Body mass index is 28.5 kg/m².  Body surface area is 2.25 meters squared.  Physical Exam  Vitals and nursing note reviewed.   Constitutional:       General: He is not in acute distress.     Appearance: Normal appearance.   HENT:      Head: Normocephalic and atraumatic.      Mouth/Throat:      Mouth: " Mucous membranes are moist.   Eyes:      General: No scleral icterus.     Extraocular Movements: Extraocular movements intact.      Conjunctiva/sclera: Conjunctivae normal.   Neck:      Vascular: No JVD.   Cardiovascular:      Rate and Rhythm: Normal rate and regular rhythm.      Heart sounds: No murmur heard.  Pulmonary:      Effort: Pulmonary effort is normal.      Breath sounds: Decreased breath sounds present. No wheezing or rhonchi.   Abdominal:      General: Bowel sounds are normal. There is no distension.      Palpations: Abdomen is soft.      Tenderness: There is no abdominal tenderness.   Musculoskeletal:         General: No swelling or deformity.      Cervical back: Neck supple.   Lymphadenopathy:      Head:      Right side of head: No submandibular adenopathy.      Left side of head: No submandibular adenopathy.      Cervical: No cervical adenopathy.      Upper Body:      Right upper body: No supraclavicular or axillary adenopathy.      Left upper body: No supraclavicular or axillary adenopathy.      Lower Body: No right inguinal adenopathy. No left inguinal adenopathy.   Skin:     General: Skin is warm.      Coloration: Skin is not jaundiced.      Findings: No rash.   Neurological:      General: No focal deficit present.      Mental Status: He is alert and oriented to person, place, and time.      Cranial Nerves: Cranial nerves 2-12 are intact.   Psychiatric:         Attention and Perception: Attention normal.         Behavior: Behavior is cooperative.     ECOG SCORE    1 - Restricted in strenuous activity-ambulatory and able to carry out work of a light nature         Laboratory:  CBC with Differential:  Lab Results   Component Value Date    WBC 6.6 03/27/2023    RBC 4.21 (L) 03/27/2023    HGB 12.8 (L) 03/27/2023    HCT 40.8 (L) 03/27/2023    MCV 96.9 (H) 03/27/2023    MCH 30.4 03/27/2023    MCHC 31.4 (L) 03/27/2023    RDW 13.3 03/27/2023     03/27/2023    MPV 8.3 03/27/2023        CMP:  Sodium  Level   Date Value Ref Range Status   03/27/2023 140 136 - 145 mmol/L Final     Potassium Level   Date Value Ref Range Status   03/27/2023 4.7 3.5 - 5.1 mmol/L Final     Carbon Dioxide   Date Value Ref Range Status   03/27/2023 29 23 - 31 mmol/L Final     Blood Urea Nitrogen   Date Value Ref Range Status   03/27/2023 8.7 8.4 - 25.7 mg/dL Final     Creatinine   Date Value Ref Range Status   03/27/2023 0.84 0.73 - 1.18 mg/dL Final     Calcium Level Total   Date Value Ref Range Status   03/27/2023 9.8 8.8 - 10.0 mg/dL Final     Albumin Level   Date Value Ref Range Status   03/27/2023 3.4 3.4 - 4.8 g/dL Final     Bilirubin Total   Date Value Ref Range Status   03/27/2023 0.6 <=1.5 mg/dL Final     Alkaline Phosphatase   Date Value Ref Range Status   03/27/2023 70 40 - 150 unit/L Final     Aspartate Aminotransferase   Date Value Ref Range Status   03/27/2023 7 5 - 34 unit/L Final     Alanine Aminotransferase   Date Value Ref Range Status   03/27/2023 6 0 - 55 unit/L Final         Assessment:       1. Lung cancer, main bronchus, right    2. Neck pain    3. Liver lesion      1) Squamous cell carcinoma of right lung  -Large hilar mass that extends to the RUL    2) Liver hypodensities--> suggesting cysts        Plan:       Discussed with the patient and his wife, daughter on the phone; diagnosis, prognosis and treatment recommendations.  At this time staging unclear but seems that he has advanced disease. He has a large Mass that extends to the RUL, possible mediastinal lymphadenopathy and questionable liver hypodensities.  Discussed with the patient chemotherapy, chemoradiation risks versus benefits as well as toxicities associated with it.  Patient is willing to proceed with treatment as recommended.  He was seen by radiation oncologist.  Discussed case with Dr. Rushing.  See him either this week or early next week for initiation of radiation therapy.  He was seen by Dr Chand, surgeon, he is a candidate for pneumonectomy.    Patient will be treated with curative intent.  Will chemoradiation followed by maintenance immunotherapy versus chemoradiation followed by surgical re-evaluation.     Plan: Carbo/Taxol weekly with RT--> surgical re evaluation    Will discuss case personally with Dr. Rushing for recommendations of XRT--he will see him soon  Will plan to begin chemotherapy on 5/31/23 with XRT  Will order CT head for staging and CT C-spine(to evaluate neck pain)- [place] [facility] [place]23 for labs and chemo only  RTC in 2 weeks on 6/6/23 forTD/labs (CBC, CMP), chemo next day  Smoking cessation counseling given    The patient was seen, interviewed and examined. Pertinent lab and radiology studies were reviewed.   The patient was given ample opportunity to ask questions, and to the best of my abilities, all questions answered to satisfaction; patient demonstrated understanding of what we discussed and agreeable to the plan. Pt instructed to call should develop concerning signs/symptoms or have further questions.     Letha Silver MD  Hematology/Oncology      Professional Services   I, Jenelle Waters LPN, acted solely as a scribe for and in the presence of Dr. Letha Silver, who performed these services.

## 2023-05-25 ENCOUNTER — TELEPHONE (OUTPATIENT)
Dept: HEMATOLOGY/ONCOLOGY | Facility: CLINIC | Age: 68
End: 2023-05-25
Payer: MEDICARE

## 2023-05-25 NOTE — TELEPHONE ENCOUNTER
I called the patient my self to talk about his coming appts.  He cancelled everything because it is too early in the morning and it takes time for him to get up and then ddrive all the way to [place]. He lives in [place].     He is asking me to talk to Rad/Onc to see if they can see him tomorrow around 1:00 PM and do patient education via Telemedicine.   He will also wants chemotherapy to be schedule as late as possible, either last appt in the AM or first in PM. Prefers PM.    He has a scan schedule for 6/1 and is his Bday and will like to reschedule the week after.    I think we can accommodate.

## 2023-05-26 ENCOUNTER — OFFICE VISIT (OUTPATIENT)
Dept: RADIATION THERAPY | Facility: HOSPITAL | Age: 68
End: 2023-05-26
Attending: RADIOLOGY
Payer: MEDICARE

## 2023-05-26 DIAGNOSIS — C34.91 SQUAMOUS CELL CARCINOMA OF RIGHT LUNG: Primary | ICD-10-CM

## 2023-05-26 PROCEDURE — 77334 RADIATION TREATMENT AID(S): CPT | Performed by: RADIOLOGY

## 2023-05-26 PROCEDURE — 77280 THER RAD SIMULAJ FIELD SMPL: CPT | Performed by: RADIOLOGY

## 2023-05-26 NOTE — TELEPHONE ENCOUNTER
SPOKE WITH MRS. ARDON, GAVE VIRTUAL APPT INFORMATION. DISCUSSED FAXING LAB ORDERS TO [facility] IN [place] TO MAKE IT EASIER ON MR. ARDON SO HE DOESN'T HAVE TO GET UP TOO EARLY, SHE AGREED. LAB ORDERS WILL BE FAXED AND NOTED TO HAVE DRAWN ON 5/30/23 AND 6/13/2023.    SHE INFORMED ME OF APPT WITH DR. AGUILAR TODAY @ [phone].     SHE REQUESTED TO HAVE  CT SCANS SCHEDULED AT [facility] WEEK OF 6/12, SINCE THE NEXT COUPLE OF WEEKS WILL BE VERY BUSY FOR HER, SHE HERSELF HAS MD APPTS.   I SPOKE WITH CARLOS NORRIS IN AUTHORIZATIONS AND ASKED THAT SHE R/S SCANS TO WEEK OF 6/12 PER HER REQUEST. SHE AGREED AND WILL REACH OUT TO MRS. ARDON WITH NEW APPTS.

## 2023-05-29 ENCOUNTER — HOSPITAL ENCOUNTER (OUTPATIENT)
Facility: HOSPITAL | Age: 68
Discharge: HOME | End: 2023-05-29
Attending: INTERNAL MEDICINE | Admitting: INTERNAL MEDICINE
Payer: MEDICARE

## 2023-05-29 VITALS
HEART RATE: 85 BPM | SYSTOLIC BLOOD PRESSURE: 147 MMHG | TEMPERATURE: 99 F | RESPIRATION RATE: 17 BRPM | OXYGEN SATURATION: 98 % | DIASTOLIC BLOOD PRESSURE: 87 MMHG

## 2023-05-29 DIAGNOSIS — C34.01 LUNG CANCER, MAIN BRONCHUS, RIGHT: ICD-10-CM

## 2023-05-29 PROCEDURE — 36569 INSJ PICC 5 YR+ W/O IMAGING: CPT

## 2023-05-29 PROCEDURE — C1751 CATH, INF, PER/CENT/MIDLINE: HCPCS

## 2023-05-29 RX ORDER — SODIUM CHLORIDE 0.9 % (FLUSH) 0.9 %
10 SYRINGE (ML) INJECTION EVERY 6 HOURS
Status: DISCONTINUED | OUTPATIENT
Start: 2023-05-29 | End: 2023-05-29 | Stop reason: HOSPADM

## 2023-05-29 RX ORDER — SODIUM CHLORIDE 0.9 % (FLUSH) 0.9 %
10 SYRINGE (ML) INJECTION
Status: DISCONTINUED | OUTPATIENT
Start: 2023-05-29 | End: 2023-05-29 | Stop reason: HOSPADM

## 2023-05-29 NOTE — PROCEDURES
Deondre Ocampo IV is a 67 y.o. male patient.    Temp: 98.8 °F (37.1 °C) (05/29/23 0834)  Pulse: 85 (05/29/23 0834)  Resp: 17 (05/29/23 0834)  BP: (!) 147/87 (05/29/23 0834)  SpO2: 98 % (05/29/23 0834)    PICC  Date/Time: 5/29/2023 9:32 AM  Performed by: Pearl Vasquez RN  Consent Done: Yes  Time out: Immediately prior to procedure a time out was called to verify the correct patient, procedure, equipment, support staff and site/side marked as required  Indications: med administration  Anesthesia: local infiltration  Local anesthetic: lidocaine 1% without epinephrine  Anesthetic Total (mL): 2  Preparation: skin prepped with ChloraPrep  Skin prep agent dried: skin prep agent completely dried prior to procedure  Sterile barriers: all five maximum sterile barriers used - cap, mask, sterile gown, sterile gloves, and large sterile sheet  Hand hygiene: hand hygiene performed prior to central venous catheter insertion  Location details: left basilic  Catheter type: double lumen  Catheter size: 5 Fr  Catheter Length: 44cm    Ultrasound guidance: yes  Vessel Caliber: medium and patent, compressibility normal  Needle advanced into vessel with real time Ultrasound guidance.  Guidewire confirmed in vessel.  Sterile sheath used.  Number of attempts: 1  Post-procedure: blood return through all ports, chlorhexidine patch and sterile dressing applied    Assessment: placement verified by x-ray        Name Pearl Vasquez RN  5/29/2023

## 2023-05-30 ENCOUNTER — CLINICAL SUPPORT (OUTPATIENT)
Dept: HEMATOLOGY/ONCOLOGY | Facility: CLINIC | Age: 68
End: 2023-05-30
Payer: MEDICARE

## 2023-05-30 ENCOUNTER — OFFICE VISIT (OUTPATIENT)
Dept: HEMATOLOGY/ONCOLOGY | Facility: CLINIC | Age: 68
End: 2023-05-30
Payer: MEDICARE

## 2023-05-30 VITALS — BODY MASS INDEX: 28.5 KG/M2 | HEIGHT: 73 IN

## 2023-05-30 DIAGNOSIS — C34.01 LUNG CANCER, MAIN BRONCHUS, RIGHT: ICD-10-CM

## 2023-05-30 PROCEDURE — 99443 PR PHYSICIAN TELEPHONE EVALUATION 21-30 MIN: ICD-10-PCS | Mod: 95,,,

## 2023-05-30 PROCEDURE — 1101F PR PT FALLS ASSESS DOC 0-1 FALLS W/OUT INJ PAST YR: ICD-10-PCS | Mod: CPTII,95,,

## 2023-05-30 PROCEDURE — 99443 PR PHYSICIAN TELEPHONE EVALUATION 21-30 MIN: CPT | Mod: 95,,,

## 2023-05-30 PROCEDURE — 1126F AMNT PAIN NOTED NONE PRSNT: CPT | Mod: CPTII,95,,

## 2023-05-30 PROCEDURE — 3008F BODY MASS INDEX DOCD: CPT | Mod: CPTII,95,,

## 2023-05-30 PROCEDURE — 1101F PT FALLS ASSESS-DOCD LE1/YR: CPT | Mod: CPTII,95,,

## 2023-05-30 PROCEDURE — 1159F MED LIST DOCD IN RCRD: CPT | Mod: CPTII,95,,

## 2023-05-30 PROCEDURE — 3288F FALL RISK ASSESSMENT DOCD: CPT | Mod: CPTII,95,,

## 2023-05-30 PROCEDURE — 4010F PR ACE/ARB THEARPY RXD/TAKEN: ICD-10-PCS | Mod: CPTII,95,,

## 2023-05-30 PROCEDURE — 3008F PR BODY MASS INDEX (BMI) DOCUMENTED: ICD-10-PCS | Mod: CPTII,95,,

## 2023-05-30 PROCEDURE — 1159F PR MEDICATION LIST DOCUMENTED IN MEDICAL RECORD: ICD-10-PCS | Mod: CPTII,95,,

## 2023-05-30 PROCEDURE — 1126F PR PAIN SEVERITY QUANTIFIED, NO PAIN PRESENT: ICD-10-PCS | Mod: CPTII,95,,

## 2023-05-30 PROCEDURE — 4010F ACE/ARB THERAPY RXD/TAKEN: CPT | Mod: CPTII,95,,

## 2023-05-30 PROCEDURE — 3288F PR FALLS RISK ASSESSMENT DOCUMENTED: ICD-10-PCS | Mod: CPTII,95,,

## 2023-05-30 NOTE — PROGRESS NOTES
THERAPY EDUCATION: CARBOPLATIN+ PACLITAXEL     The patient location is: Home  The chief complaint leading to consultation is: Therapy Education   Visit type: Virtual visit with audio only (telephone)  Total time spent with patient: 60 min        The reason for the audio only service rather than synchronous audio and video virtual visit was related to technical difficulties or patient preference/necessity.     Each patient to whom I provide medical services by telemedicine is:  (1) informed of the relationship between the physician and patient and the respective role of any other health care provider with respect to management of the patient; and (2) notified that they may decline to receive medical services by telemedicine and may withdraw from such care at any time. Patient verbally consented to receive this service via voice-only telephone call.     This service was not originating from a related E/M service provided within the previous 7 days nor will  to an E/M service or procedure within the next 24 hours or my soonest available appointment.  Prevailing standard of care was able to be met in this audio-only visit.        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  The mass abuts the right main pulmonary artery and right main stem bronchus.  Subcentimeter mediastinal lymph node noted.  One of LN slightly hypermetabolic with SUV of 3 and is located laterally.  Patchy hypermetabolism within the liver as suspected, however, there are few tiny focal areas of hypermetabolism out of proportion to the rest of the liver.  At least 1 of this corresponds to a low-density focus seen in the noncontrast exam.  CT 5/12/2023:  changes consistent with COPD and emphysema in the lungs bilaterally with multiple bulla bleb seen. mass seen in the right hilum extending into the right upper lobe.  It is somewhat infiltrative.  This was seen on the prior PET-CT as well.  The findings are not significantly changed.  There are multiple associated satellite nodules in the right upper lobe.  Rough dimensions of the hilar component of the mass is 5.4 x 5.1 cm and rough measurements of the dominant lesion in the right upper lobe is 11 cm x 3.6 cm.  The mass extends into the right mainstem and right upper lobe bronchus.  This was seen on the prior examination as well.  There is narrowing of the right upper lobe bronchus there is some narrowing of the right upper lobe pulmonary arteries.  Findings are similar to the prior examination.  Scattered areas of punctate subcentimeter nodularity is seen in the right lower lobe.  These are too small to characterize and similar changes were seen previously.    No pleural effusion is seen.  Thoracic aorta is normal in caliber.  There is some mediastinal lymphadenopathy seen.  Reference lymph node is measured in the precarinal region on image number 39 series 2 at 1.5 x 0.9 cm.  This is similar to the prior examination.  Scattered punctate areas of hypoattenuation are seen throughout the liver.  Similar changes were seen previously.  The lesions are too small to characterize but may represent cysts.  The largest lesion is seen in segment 4.  It does not enhance and measures 2 cm x 1.5 cm.  Findings may represent a cyst.  No adrenal nodules are seen.      Pathology:  02/10/2023:  Right upper lobe lung needle biopsy: Scattered highly atypical squamous cells, suspicious for squamous cell carcinoma in a background of abundant acute inflammation and necrosis  Right upper lobe, brushing:  Positive for malignant cells.  Squamous cell carcinoma, moderately differentiated.  Right upper lobe biopsy squamous cell carcinoma,  moderately differentiated with focal keratinization.  Right lung washings positive for malignant cells.  Squamous cell carcinoma in a background of abundant necrosis.    CLINICAL HISTORY:       Patient: Deondre Ocampo IV is a 67 y.o. male.  Kindly referred for newly diagnosed squamous cell carcinoma of the lung.    He reports that he was being evaluated for a right side pain went imaging studies showed a mass in the lung.  I do not have the CTs.  But he eventually underwent bronchoscopy that showed squamous cell carcinoma.  PET-CT showed the right suprahilar mass with possible mediastinal lymphadenopathy and questionable liver lesions.  He is here to discuss further recommendations.    He is here with his wife.  Patient has history of 53 pack year smoking.  He reports that his breathing is fine.  He denies any chest pain, short of breath or coughing.  No hemoptysis.  He has an appointment to see the radiation oncologist next week.    Chief Complaint: Therapy Education       Interval History:  5/30/23: Mr. Ocampo presents via virtual tele health visit for therapy education. Patient reports no complaints today.     5/24/23:Patient presents today for follow up to discuss CT scan results, he is with his wife and daughter is joined by phone. He was evaluated by Dr. Chand on 4/19/23, he ordered CT scan. He discussed case with me personally and believes lung mass can be removed. I would like to speak with Dr. Rushing to discuss XRT. He was evaluated in ER last week for abdominal pain and prescribed Bentyl daily. He c/o of neck pain today, described as a shooting pain especially when trying to turn head. . Otherwise, he is doing okay. He is willing to start treatment.        Past Medical History:   Diagnosis Date    Diabetes mellitus     High cholesterol     Hypertension     Lung cancer       Past Surgical History:   Procedure Laterality Date    BACK SURGERY      DEBRIDEMENT TENNIS ELBOW Right     PERIPHERALLY INSERTED  CENTRAL CATHETER INSERTION Left 2023    Procedure: Insertion-picc;  Surgeon: Letha Silver MD;  Location: [facility] OR;  Service: Oncology;  Laterality: Left;    right arm       Family History   Problem Relation Age of Onset    Coronary artery disease Mother     No Known Problems Father     Coronary artery disease Brother     Cancer Brother     Diabetes Brother     Heart failure Brother     Diabetes Brother     Leukemia Brother     Leukemia Daughter      Social Connections: Not on file       Review of patient's allergies indicates:   Allergen Reactions    Ace inhibitors Swelling    Penicillins Itching and Rash      Current Outpatient Medications on File Prior to Visit   Medication Sig Dispense Refill    aspirin (ECOTRIN) 81 MG EC tablet Take 81 mg by mouth once daily.      dicyclomine (BENTYL) 20 mg tablet Take 20 mg by mouth once daily. Once daily in the AM.      glimepiride (AMARYL) 2 MG tablet Take 2 mg by mouth before breakfast.      losartan (COZAAR) 100 MG tablet Take 100 mg by mouth once daily.      metoprolol tartrate (LOPRESSOR) 50 MG tablet Take 50 mg by mouth 2 (two) times daily.      pravastatin (PRAVACHOL) 20 MG tablet Take 20 mg by mouth once daily.      TRULICITY 1.5 mg/0.5 mL pen injector SMARTSI pre-filled pen syringe SUB-Q Once a Week       Current Facility-Administered Medications on File Prior to Visit   Medication Dose Route Frequency Provider Last Rate Last Admin    [DISCONTINUED] sodium chloride 0.9% flush 10 mL  10 mL Intravenous Q6H Letha Silver MD        [DISCONTINUED] sodium chloride 0.9% flush 10 mL  10 mL Intravenous PRN Letha Silver MD          Review of Systems   Constitutional:  Negative for activity change, appetite change, chills, diaphoresis, fatigue, fever and unexpected weight change.   HENT:  Negative for nasal congestion, mouth sores, nosebleeds, sinus pressure/congestion, sore throat and trouble swallowing.    Eyes: Negative.    Respiratory:  Negative  for cough and shortness of breath.    Cardiovascular:  Negative for chest pain and palpitations.   Gastrointestinal:  Negative for abdominal distention, abdominal pain, blood in stool, change in bowel habit, constipation, diarrhea, nausea, vomiting and change in bowel habit.   Endocrine: Negative.    Genitourinary:  Negative for bladder incontinence, decreased urine volume, difficulty urinating, dysuria, frequency, hematuria and urgency.   Musculoskeletal:  Positive for back pain and neck pain. Negative for arthralgias, gait problem, joint swelling, leg pain and myalgias.   Integumentary:  Negative for rash.   Allergic/Immunologic: Negative.    Neurological:  Negative for dizziness, tremors, syncope, weakness, light-headedness, numbness, headaches and memory loss.   Hematological:  Negative for adenopathy. Does not bruise/bleed easily.   Psychiatric/Behavioral:  Negative for agitation, confusion, hallucinations, sleep disturbance and suicidal ideas. The patient is not nervous/anxious.           Laboratory:  CBC with Differential:  Lab Results   Component Value Date    WBC 6.6 03/27/2023    RBC 4.21 (L) 03/27/2023    HGB 12.8 (L) 03/27/2023    HCT 40.8 (L) 03/27/2023    MCV 96.9 (H) 03/27/2023    MCH 30.4 03/27/2023    MCHC 31.4 (L) 03/27/2023    RDW 13.3 03/27/2023     03/27/2023    MPV 8.3 03/27/2023        CMP:  Sodium Level   Date Value Ref Range Status   03/27/2023 140 136 - 145 mmol/L Final     Potassium Level   Date Value Ref Range Status   03/27/2023 4.7 3.5 - 5.1 mmol/L Final     Carbon Dioxide   Date Value Ref Range Status   03/27/2023 29 23 - 31 mmol/L Final     Blood Urea Nitrogen   Date Value Ref Range Status   03/27/2023 8.7 8.4 - 25.7 mg/dL Final     Creatinine   Date Value Ref Range Status   03/27/2023 0.84 0.73 - 1.18 mg/dL Final     Calcium Level Total   Date Value Ref Range Status   03/27/2023 9.8 8.8 - 10.0 mg/dL Final     Albumin Level   Date Value Ref Range Status   03/27/2023 3.4 3.4 -  4.8 g/dL Final     Bilirubin Total   Date Value Ref Range Status   03/27/2023 0.6 <=1.5 mg/dL Final     Alkaline Phosphatase   Date Value Ref Range Status   03/27/2023 70 40 - 150 unit/L Final     Aspartate Aminotransferase   Date Value Ref Range Status   03/27/2023 7 5 - 34 unit/L Final     Alanine Aminotransferase   Date Value Ref Range Status   03/27/2023 6 0 - 55 unit/L Final         Assessment:       1) Squamous cell carcinoma of right lung  -Large hilar mass that extends to the RUL    2) Liver hypodensities--> suggesting cysts        Plan:   -PICC placement completed on 5/29/23  -Therapy education completed via virtual audio only on 5/30/23.  -Plans to start Carbo/Taxol on 5/31/23.   -Radiation therapy to start on 5/31/23 in [place] after chemotherapy  -CT head/spine to be scheduled for the week of June 12th.  -OTC Imodium AD recommended for diarrhea (4-6 BMs a day). Take 2 tablets after the first loose bowel movement, and 1 tablet after each loose bowel movement after the first dose has been taken. No more than 4 tablets should be taken in any 24-hour period. If not working, Lomotil can be prescribed as 2nd choice.    -Mouth sore prevention with 1-quart warm water with 1 tsp of baking soda and salt and alcohol-free mouthwash.   -Emphasized adequate hand-hygiene and limited contact with people who are sick.   -Monitor and notify any bleeding in urine, stool, or sputum.  As well as unusual bleeding or bruising and stomach pain.   - Emphasized hydration with 4 16 oz bottles of water a day.    -Importance of moisturizing with fragrance free lotion to prevent skin rash and/or hand-foot syndrome.  -Call clinic if fever >100.4, shakes or chills, shortness of breath, chest pain, uncontrolled vomiting or diarrhea, pain and tingling in the chest or arm, or just not feeling well.    - RTC on 6/6/23 for same day labs (CBC, CMP) and TD with Dr. Silver.     DISCUSSION:    1.  A total of 60 minutes were spent in  counseling today, in which 100% were face-to-face.  At today's therapy teaching session, we discussed the patient's cancer diagnosis as well as planned therapy regimen, protocol, side effects and toxicities.  A handout of each therapeutic agent in the regimen was provided and reviewed in detail.    2.  The following side effects were discussed but not limited to:    The following side effects are common (occurring in greater than 30%) for patients taking Carboplatin:    Low blood counts (including red blood cells, white blood cells and platelets)  Salbador: Meaning low point, salbador is the point in time between chemotherapy cycles in which you experience low blood counts.    Onset: None reported  Salbador: 21 days  Recovery: 28 days    Nausea and vomiting usually occurring within 24 hours of treatment  Taste changes  Hair loss  Weakness  Blood test abnormalities: Abnormal magnesium level    These are less common (occurring in 10-29%) side effects for patients receiving Carboplatin:    Burning sensation at the injection site  Abdominal pain  Diarrhea  Constipation  Mouth sores  Infection  Peripheral neuropathy: Although uncommon, a serious side effect of decreased sensation and paresthesia (numbness and tingling of the extremities) may be noted. Sensory loss, numbness and tingling, and difficulty in walking may last for at least as long as therapy is continued. These side effects may become progressively more severe with continued treatment, and your doctor may decide to decrease your dose.  Central neurotoxicity:  Infrequent but patients over age 65 are at increased risk.  Symptoms include dizziness, confusion, visual changes, ringing in the ears.  Nephrotoxicity (see kidney problems):  More frequent when Carboplatin is given in high doses or to people with kidney problems.  Hearing loss (ototoxicity) - loss of high pitched sounds.  Abnormal blood electrolyte levels (sodium, potassium, calcium).   Abnormal blood liver  enzymes (SGOT, Alkaline phosphatase) (see liver problems).  Cardiovascular events.  Although infrequent, heart failure, blood clots and strokes have been reported with Carboplatin use.  Less than 1% were life-threatening.   Allergic reaction may occur.  It would occur during the actual transfusion.  This may include itching, rash, shortness of breath or dizziness    The following side effects are common (occurring in greater than 30%) for patients taking Paclitaxel:    Low blood counts. Your white and red blood cells and platelets may temporarily decrease. This can put you at increased risk for infection, anemia and/or bleeding.  Hair loss  Arthralgias and myalgias, pain in the joints and muscles. (see pain) Usually temporary occurring 2 to 3 days after Paclitaxel, and resolve within a few days.  Peripheral neuropathy (numbness and tingling of the hands and feet)  Nausea and vomiting (usually mild)  Diarrhea  Mouth sores  Hypersensitivity reaction. Fever, facial flushing, chills, shortness of breath, or hives after Paclitaxel is given (see allergic reaction). The majority of these reactions occur within the first 10 minutes of an infusion. Notify your healthcare provider immediately (premedication regimen has significantly decreased the incidence of this reaction).    The following are less common side effects (occurring in 10-29%) for patients receiving Paclitaxel:    Swelling of the feet or ankles (edema).  Increases in blood tests measuring liver function. These return to normal once treatment is discontinued (see liver problems).  Low blood pressure (occurring during the first 3 hours of infusion).  Darkening of the skin where previous radiation treatment has been given (radiation recall - see skin reactions).  Nail changes (discoloration of nail beds - rare) (see skin reactions).  Pepe: 15-21 days                a.  Discussed the risk of infection while on therapy related to pancytopenia, specifically a  decrease in their white blood cell count.  Instructed to contact our office for temperature >100.4 F, chills, sudden onset cough or shortness of breath, symptoms of a urinary tract infection.                b.  Discussed the risk of anemia. Instructed to contact our office for dizziness, heart palpitations, or extreme or sudden changes in weakness.                c.  Discussed the risk of thrombocytopenia, which increases the risk of bruising or bleeding.  Instructed the patient to contact our office for spontaneous signs of bleeding, including nose bleeds, bleeding from the gums or mouth, blood in sputum, urine or stool and unusual or excessive bruising or rash.                d.  Discussed GI side effects including weight changes, changes in appetite, altered sense of taste, stomatitis, nausea, vomiting, diarrhea, constipation, and heartburn.                e.  Discussed  side effects including painful urination, changes in the amount of urination, possible urine color changes.  Discussed fertility issues and to prevent  pregnancy if of child bearing age.                f.  Discussed neurological side effects including the risk of peripheral neuropathy, either temporary or permanent.                g.  Discussed the potential for skin, hair, and nail changes.       3.  Instructed to contact our office for discussion of medication changes, the addition of vitamin and/or herbal supplementation as they may interact with some chemotherapy agents.    4.  Discussed dietary modifications and the need to maintain adequate caloric intake and proper oral hydration.  Recommended 64 ounces of fluid per day.    5.  Discussed anti-emetic protocol and bowel regimen protocol.    6.  Office contact information given including after hours number.  Discussed there is an oncologist on call 24/7, 365 days including weekends.  Provided primary nurse's information .    7.  In summary, the patient is in agreement with the plan of care.   Questions appeared to be answered to their satisfaction. Consented the patient to the treatment plan and the patient was educated on the planned duration of the treatment and schedule of the treatment administration. Copy to be scanned into the chart.    All questions answered to the satisfaction of the patient and family.     Follow up appointments given to kamran.      LEONOR NJ-FRANCIS  PATIENT EDUCATOR  [facility]

## 2023-05-30 NOTE — PROGRESS NOTES
Established Patient - Audio Only Telehealth Visit     The patient location is: at his home  The chief complaint leading to consultation is: patient education for lung cancer  Visit type: Virtual visit with audio only (telephone)  Total time spent with patient: 30 minutes       The reason for the audio only service rather than synchronous audio and video virtual visit was related to technical difficulties or patient preference/necessity.     Each patient to whom I provide medical services by telemedicine is:  (1) informed of the relationship between the physician and patient and the respective role of any other health care provider with respect to management of the patient; and (2) notified that they may decline to receive medical services by telemedicine and may withdraw from such care at any time. Patient verbally consented to receive this service via voice-only telephone call.       HPI: patient educaiton     Assessment and plan:   assessment                        This service was not originating from a related E/M service provided within the previous 7 days nor will  to an E/M service or procedure within the next 24 hours or my soonest available appointment.  Prevailing standard of care was able to be met in this audio-only visit.

## 2023-05-30 NOTE — NURSING
I spoke with Deondre and his wife over the phone for his patient education appointment. I introduced myself, explained my role and discussed his emotional wellbeing. He stated he struggles from time to time. It is mainly his thought processes and thinking patterns. We discussed ways to change his thoughts. I offered my support and they were relieved to know they can call if any issues arise. I referred him to the [facility].

## 2023-05-30 NOTE — PROGRESS NOTES
Established Patient - Audio Only Telehealth Visit     The patient location is: [place], [place]  The chief complaint leading to consultation is: education  Visit type: Virtual visit with audio only (telephone)  Total time spent with patient: 15 minutes       The reason for the audio only service rather than synchronous audio and video virtual visit was related to technical difficulties or patient preference/necessity.     Each patient to whom I provide medical services by telemedicine is:  (1) informed of the relationship between the physician and patient and the respective role of any other health care provider with respect to management of the patient; and (2) notified that they may decline to receive medical services by telemedicine and may withdraw from such care at any time. Patient verbally consented to receive this service via voice-only telephone call.     This service was not originating from a related E/M service provided within the previous 7 days nor will  to an E/M service or procedure within the next 24 hours or my soonest available appointment.  Prevailing standard of care was able to be met in this audio-only visit.        Oncology Nutrition Evaluation      Deondre Ocampo IV   1955    Oncology Provider:   Elvis Serra MD    Reason for Visit:  New Treatment Education    Oncology/Hematology Diagnosis:   T4N2?Mx-Squamous cell carcinoma of the right upper lobe    Treatment Plan:  Carbo/Taxol weekly with RT    Nutrition Recommendations:  1. Regular diet as tolerated    Nutrition Assessment    5/30/23: This is a 67 y.o.male with a medical diagnosis of lung CA. We spoke over the phone. He reports good appetite and po intake. No c/o n/v/c/d. WT has been stable.    Nutrition Factors Affecting Intake  none identified    PMHx: DM, high chol, HTN    Allergies: Ace inhibitors and Penicillins    Current Medications:    Current Outpatient Medications:     aspirin (ECOTRIN) 81 MG EC tablet, Take 81  "mg by mouth once daily., Disp: , Rfl:     dicyclomine (BENTYL) 20 mg tablet, Take 20 mg by mouth once daily. Once daily in the AM., Disp: , Rfl:     glimepiride (AMARYL) 2 MG tablet, Take 2 mg by mouth before breakfast., Disp: , Rfl:     losartan (COZAAR) 100 MG tablet, Take 100 mg by mouth once daily., Disp: , Rfl:     metoprolol tartrate (LOPRESSOR) 50 MG tablet, Take 50 mg by mouth 2 (two) times daily., Disp: , Rfl:     pravastatin (PRAVACHOL) 20 MG tablet, Take 20 mg by mouth once daily., Disp: , Rfl:     TRULICITY 1.5 mg/0.5 mL pen injector, SMARTSI pre-filled pen syringe SUB-Q Once a Week, Disp: , Rfl:     Labs: no recent    Anthropometrics    Height:   Ht Readings from Last 1 Encounters:   23 6' 1" (1.854 m)      Weight:   Wt Readings from Last 3 Encounters:   23 98 kg (216 lb)   23 98.4 kg (217 lb)   23 98.7 kg (217 lb 9.6 oz)        Usual Body Weight: 98 kg (216 lb)  % Weight Change: 0    BMI: 28.5 (overweight)    Ideal Weight: 83.6 kg (184 lb)  % Ideal Weight: 117%      Nutrition Diagnosis    PES: Food and nutrition related knowledge deficit related to lung CA as evidenced by lack of prior exposure to onc nutrition. (resolved)    Nutrition Risk  low    Nutrition Intervention    Interventions(treatment strategy):  Education Provided: antimicrobial/neutropenic  Teaching Method: explanation and printed materials  Comprehension: verbalizes understanding  Barriers to Learning: none evident  Expected Compliance: good  Comments: All questions were answered and dietitian's contact information was provided.        Nutrition Monitoring and Evaluation    Ongoing monitoring not warranted at this time. Please consult SHERRON brownn.        Natasha Rivera, MS, RD, , LDN                                                                                                                                                                                                                                             "

## 2023-05-31 ENCOUNTER — INFUSION (OUTPATIENT)
Dept: INFUSION THERAPY | Facility: HOSPITAL | Age: 68
End: 2023-05-31
Attending: INTERNAL MEDICINE
Payer: MEDICARE

## 2023-05-31 VITALS
TEMPERATURE: 99 F | HEART RATE: 83 BPM | SYSTOLIC BLOOD PRESSURE: 158 MMHG | OXYGEN SATURATION: 98 % | DIASTOLIC BLOOD PRESSURE: 91 MMHG

## 2023-05-31 DIAGNOSIS — C34.91 SQUAMOUS CELL CARCINOMA OF RIGHT LUNG: Primary | ICD-10-CM

## 2023-05-31 PROCEDURE — 96375 TX/PRO/DX INJ NEW DRUG ADDON: CPT

## 2023-05-31 PROCEDURE — 96415 CHEMO IV INFUSION ADDL HR: CPT

## 2023-05-31 PROCEDURE — 96417 CHEMO IV INFUS EACH ADDL SEQ: CPT

## 2023-05-31 PROCEDURE — 63600175 PHARM REV CODE 636 W HCPCS: Performed by: INTERNAL MEDICINE

## 2023-05-31 PROCEDURE — 25000003 PHARM REV CODE 250: Performed by: INTERNAL MEDICINE

## 2023-05-31 PROCEDURE — 96413 CHEMO IV INFUSION 1 HR: CPT

## 2023-05-31 RX ORDER — DIPHENHYDRAMINE HYDROCHLORIDE 50 MG/ML
50 INJECTION INTRAMUSCULAR; INTRAVENOUS ONCE AS NEEDED
Status: DISCONTINUED | OUTPATIENT
Start: 2023-05-31 | End: 2023-05-31 | Stop reason: HOSPADM

## 2023-05-31 RX ORDER — HEPARIN 100 UNIT/ML
500 SYRINGE INTRAVENOUS
Status: DISCONTINUED | OUTPATIENT
Start: 2023-05-31 | End: 2023-05-31 | Stop reason: HOSPADM

## 2023-05-31 RX ORDER — DIPHENHYDRAMINE HYDROCHLORIDE 50 MG/ML
50 INJECTION INTRAMUSCULAR; INTRAVENOUS
Status: COMPLETED | OUTPATIENT
Start: 2023-05-31 | End: 2023-05-31

## 2023-05-31 RX ORDER — FAMOTIDINE 10 MG/ML
20 INJECTION INTRAVENOUS
Status: COMPLETED | OUTPATIENT
Start: 2023-05-31 | End: 2023-05-31

## 2023-05-31 RX ORDER — EPINEPHRINE 0.3 MG/.3ML
0.3 INJECTION SUBCUTANEOUS ONCE AS NEEDED
Status: DISCONTINUED | OUTPATIENT
Start: 2023-05-31 | End: 2023-05-31 | Stop reason: HOSPADM

## 2023-05-31 RX ORDER — SODIUM CHLORIDE 0.9 % (FLUSH) 0.9 %
10 SYRINGE (ML) INJECTION
Status: DISCONTINUED | OUTPATIENT
Start: 2023-05-31 | End: 2023-05-31 | Stop reason: HOSPADM

## 2023-05-31 RX ADMIN — CARBOPLATIN 285 MG: 10 INJECTION, SOLUTION INTRAVENOUS at 02:05

## 2023-05-31 RX ADMIN — FAMOTIDINE 20 MG: 10 INJECTION INTRAVENOUS at 11:05

## 2023-05-31 RX ADMIN — DIPHENHYDRAMINE HYDROCHLORIDE 50 MG: 50 INJECTION, SOLUTION INTRAMUSCULAR; INTRAVENOUS at 11:05

## 2023-05-31 RX ADMIN — PALONOSETRON 0.25 MG: 0.25 INJECTION, SOLUTION INTRAVENOUS at 11:05

## 2023-05-31 RX ADMIN — PACLITAXEL 102 MG: 6 INJECTION, SOLUTION INTRAVENOUS at 12:05

## 2023-06-05 ENCOUNTER — OFFICE VISIT (OUTPATIENT)
Dept: HEMATOLOGY/ONCOLOGY | Facility: CLINIC | Age: 68
End: 2023-06-05
Payer: MEDICARE

## 2023-06-05 VITALS
OXYGEN SATURATION: 99 % | BODY MASS INDEX: 28.2 KG/M2 | TEMPERATURE: 98 F | HEART RATE: 64 BPM | HEIGHT: 73 IN | DIASTOLIC BLOOD PRESSURE: 98 MMHG | WEIGHT: 212.81 LBS | SYSTOLIC BLOOD PRESSURE: 158 MMHG

## 2023-06-05 DIAGNOSIS — Z79.899 ON ANTINEOPLASTIC CHEMOTHERAPY: ICD-10-CM

## 2023-06-05 DIAGNOSIS — C34.91 SQUAMOUS CELL CARCINOMA OF RIGHT LUNG: Primary | ICD-10-CM

## 2023-06-05 DIAGNOSIS — F17.200 SMOKER: ICD-10-CM

## 2023-06-05 DIAGNOSIS — K76.9 LIVER LESION: ICD-10-CM

## 2023-06-05 PROCEDURE — 3077F PR MOST RECENT SYSTOLIC BLOOD PRESSURE >= 140 MM HG: ICD-10-PCS | Mod: CPTII,S$GLB,, | Performed by: INTERNAL MEDICINE

## 2023-06-05 PROCEDURE — 1101F PT FALLS ASSESS-DOCD LE1/YR: CPT | Mod: CPTII,S$GLB,, | Performed by: INTERNAL MEDICINE

## 2023-06-05 PROCEDURE — 1159F MED LIST DOCD IN RCRD: CPT | Mod: CPTII,S$GLB,, | Performed by: INTERNAL MEDICINE

## 2023-06-05 PROCEDURE — 1126F AMNT PAIN NOTED NONE PRSNT: CPT | Mod: CPTII,S$GLB,, | Performed by: INTERNAL MEDICINE

## 2023-06-05 PROCEDURE — 3288F PR FALLS RISK ASSESSMENT DOCUMENTED: ICD-10-PCS | Mod: CPTII,S$GLB,, | Performed by: INTERNAL MEDICINE

## 2023-06-05 PROCEDURE — 3008F BODY MASS INDEX DOCD: CPT | Mod: CPTII,S$GLB,, | Performed by: INTERNAL MEDICINE

## 2023-06-05 PROCEDURE — 4010F ACE/ARB THERAPY RXD/TAKEN: CPT | Mod: CPTII,S$GLB,, | Performed by: INTERNAL MEDICINE

## 2023-06-05 PROCEDURE — 3008F PR BODY MASS INDEX (BMI) DOCUMENTED: ICD-10-PCS | Mod: CPTII,S$GLB,, | Performed by: INTERNAL MEDICINE

## 2023-06-05 PROCEDURE — 3077F SYST BP >= 140 MM HG: CPT | Mod: CPTII,S$GLB,, | Performed by: INTERNAL MEDICINE

## 2023-06-05 PROCEDURE — 3288F FALL RISK ASSESSMENT DOCD: CPT | Mod: CPTII,S$GLB,, | Performed by: INTERNAL MEDICINE

## 2023-06-05 PROCEDURE — 1159F PR MEDICATION LIST DOCUMENTED IN MEDICAL RECORD: ICD-10-PCS | Mod: CPTII,S$GLB,, | Performed by: INTERNAL MEDICINE

## 2023-06-05 PROCEDURE — 4010F PR ACE/ARB THEARPY RXD/TAKEN: ICD-10-PCS | Mod: CPTII,S$GLB,, | Performed by: INTERNAL MEDICINE

## 2023-06-05 PROCEDURE — 1101F PR PT FALLS ASSESS DOC 0-1 FALLS W/OUT INJ PAST YR: ICD-10-PCS | Mod: CPTII,S$GLB,, | Performed by: INTERNAL MEDICINE

## 2023-06-05 PROCEDURE — 99999 PR PBB SHADOW E&M-EST. PATIENT-LVL III: ICD-10-PCS | Mod: PBBFAC,,, | Performed by: INTERNAL MEDICINE

## 2023-06-05 PROCEDURE — 3080F DIAST BP >= 90 MM HG: CPT | Mod: CPTII,S$GLB,, | Performed by: INTERNAL MEDICINE

## 2023-06-05 PROCEDURE — 99999 PR PBB SHADOW E&M-EST. PATIENT-LVL III: CPT | Mod: PBBFAC,,, | Performed by: INTERNAL MEDICINE

## 2023-06-05 PROCEDURE — 99215 OFFICE O/P EST HI 40 MIN: CPT | Mod: S$GLB,,, | Performed by: INTERNAL MEDICINE

## 2023-06-05 PROCEDURE — 3080F PR MOST RECENT DIASTOLIC BLOOD PRESSURE >= 90 MM HG: ICD-10-PCS | Mod: CPTII,S$GLB,, | Performed by: INTERNAL MEDICINE

## 2023-06-05 PROCEDURE — 99215 PR OFFICE/OUTPT VISIT, EST, LEVL V, 40-54 MIN: ICD-10-PCS | Mod: S$GLB,,, | Performed by: INTERNAL MEDICINE

## 2023-06-05 PROCEDURE — 1126F PR PAIN SEVERITY QUANTIFIED, NO PAIN PRESENT: ICD-10-PCS | Mod: CPTII,S$GLB,, | Performed by: INTERNAL MEDICINE

## 2023-06-05 RX ORDER — DIPHENHYDRAMINE HYDROCHLORIDE 50 MG/ML
50 INJECTION INTRAMUSCULAR; INTRAVENOUS ONCE AS NEEDED
Status: CANCELLED | OUTPATIENT
Start: 2023-06-07

## 2023-06-05 RX ORDER — EPINEPHRINE 0.3 MG/.3ML
0.3 INJECTION SUBCUTANEOUS ONCE AS NEEDED
Status: CANCELLED | OUTPATIENT
Start: 2023-06-07

## 2023-06-05 RX ORDER — FAMOTIDINE 10 MG/ML
20 INJECTION INTRAVENOUS
Status: CANCELLED | OUTPATIENT
Start: 2023-06-07

## 2023-06-05 RX ORDER — DIPHENHYDRAMINE HYDROCHLORIDE 50 MG/ML
50 INJECTION INTRAMUSCULAR; INTRAVENOUS
Status: CANCELLED
Start: 2023-06-07

## 2023-06-05 RX ORDER — HEPARIN 100 UNIT/ML
500 SYRINGE INTRAVENOUS
Status: CANCELLED | OUTPATIENT
Start: 2023-06-07

## 2023-06-05 RX ORDER — OXYCODONE AND ACETAMINOPHEN 10; 325 MG/1; MG/1
1 TABLET ORAL EVERY 4 HOURS PRN
COMMUNITY
Start: 2023-05-26 | End: 2024-01-10 | Stop reason: SDUPTHER

## 2023-06-05 RX ORDER — SODIUM CHLORIDE 0.9 % (FLUSH) 0.9 %
10 SYRINGE (ML) INJECTION
Status: CANCELLED | OUTPATIENT
Start: 2023-06-07

## 2023-06-05 NOTE — PROGRESS NOTES
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  The mass abuts the right main pulmonary artery and right main stem bronchus.  Subcentimeter mediastinal lymph node noted.  One of LN slightly hypermetabolic with SUV of 3 and is located laterally.  Patchy hypermetabolism within the liver as suspected, however, there are few tiny focal areas of hypermetabolism out of proportion to the rest of the liver.  At least 1 of this corresponds to a low-density focus seen in the noncontrast exam.  CT 5/12/2023: changes consistent with COPD and emphysema in the lungs bilaterally with multiple bulla bleb seen. mass seen in the right hilum extending into the right upper lobe.  It is somewhat infiltrative.  This was seen on the prior PET-CT as well.  The findings are not significantly changed.  There are multiple associated satellite nodules in the right upper lobe.  Rough dimensions of the hilar component of the mass is 5.4 x 5.1 cm and rough measurements of the dominant lesion in the right upper lobe is 11 cm x 3.6 cm.  The mass extends into the right mainstem and right upper lobe bronchus.  This was seen on the prior examination as well.  There is narrowing of the right upper lobe bronchus there is some narrowing of the right upper lobe pulmonary arteries.  Findings are similar to the prior examination.  Scattered areas of punctate subcentimeter nodularity is seen in the right lower lobe.  These are too small to characterize and similar changes were seen previously.    No  pleural effusion is seen.  Thoracic aorta is normal in caliber.  There is some mediastinal lymphadenopathy seen.  Reference lymph node is measured in the precarinal region on image number 39 series 2 at 1.5 x 0.9 cm.  This is similar to the prior examination.  Scattered punctate areas of hypoattenuation are seen throughout the liver.  Similar changes were seen previously.  The lesions are too small to characterize but may represent cysts.  The largest lesion is seen in segment 4.  It does not enhance and measures 2 cm x 1.5 cm.  Findings may represent a cyst.  No adrenal nodules are seen.      Pathology:  02/10/2023:  Right upper lobe lung needle biopsy: Scattered highly atypical squamous cells, suspicious for squamous cell carcinoma in a background of abundant acute inflammation and necrosis  Right upper lobe, brushing:  Positive for malignant cells.  Squamous cell carcinoma, moderately differentiated.  Right upper lobe biopsy squamous cell carcinoma, moderately differentiated with focal keratinization.  Right lung washings positive for malignant cells.  Squamous cell carcinoma in a background of abundant necrosis.    CLINICAL HISTORY:       Patient: Deondre Ocampo IV is a 68 y.o. male.  Kindly referred for newly diagnosed squamous cell carcinoma of the lung.    He reports that he was being evaluated for a right side pain went imaging studies showed a mass in the lung.  I do not have the CTs.  But he eventually underwent bronchoscopy that showed squamous cell carcinoma.  PET-CT showed the right suprahilar mass with possible mediastinal lymphadenopathy and questionable liver lesions.  He is here to discuss further recommendations.    He is here with his wife.  Patient has history of 53 pack year smoking.  He reports that his breathing is fine.  He denies any chest pain, short of breath or coughing.  No hemoptysis.  He has an appointment to see the radiation oncologist next week.    Chief Complaint: OTHER (No  concerns today.)      Interval History:  Patient presents today for follow up, he is with his wife. He began chemo with XRT on 5/31/23. He tolerated first cycle well.  He is due for C2 on 6/7/23. He reports constipation, not taking stool softener.  Does take Oxycodone for pain. Otherwise, he is doing well.  He was upset because he was told blood would be drawn from PICC line by nurse at [facility]. When he arrived here, he was told he would have to be stuck and PICC line was not going to be used. He was not given the opportunity to explain what he was told by [facility] and that made him upset.      Past Medical History:   Diagnosis Date    Diabetes mellitus     High cholesterol     Hypertension     Lung cancer       Past Surgical History:   Procedure Laterality Date    BACK SURGERY      DEBRIDEMENT TENNIS ELBOW Right     PERIPHERALLY INSERTED CENTRAL CATHETER INSERTION Left 5/29/2023    Procedure: Insertion-picc;  Surgeon: Letha Silver MD;  Location: [facility];  Service: Oncology;  Laterality: Left;    right arm       Family History   Problem Relation Age of Onset    Coronary artery disease Mother     No Known Problems Father     Coronary artery disease Brother     Cancer Brother     Diabetes Brother     Heart failure Brother     Diabetes Brother     Leukemia Brother     Leukemia Daughter      Social Connections: Not on file       Review of patient's allergies indicates:   Allergen Reactions    Ace inhibitors Swelling    Penicillins Itching and Rash      Current Outpatient Medications on File Prior to Visit   Medication Sig Dispense Refill    aspirin (ECOTRIN) 81 MG EC tablet Take 81 mg by mouth once daily.      dicyclomine (BENTYL) 20 mg tablet Take 20 mg by mouth once daily. Once daily in the AM.      glimepiride (AMARYL) 2 MG tablet Take 2 mg by mouth before breakfast.      losartan (COZAAR) 100 MG tablet Take 100 mg by mouth once daily.      metoprolol tartrate (LOPRESSOR) 50 MG tablet Take 50 mg by  "mouth 2 (two) times daily.      oxyCODONE-acetaminophen (PERCOCET)  mg per tablet Take 1 tablet by mouth every 4 (four) hours as needed. for pain.      pravastatin (PRAVACHOL) 20 MG tablet Take 20 mg by mouth once daily.      TRULICITY 1.5 mg/0.5 mL pen injector SMARTSI pre-filled pen syringe SUB-Q Once a Week       No current facility-administered medications on file prior to visit.      Review of Systems   Constitutional:  Negative for activity change, appetite change, chills, diaphoresis, fatigue, fever and unexpected weight change.   HENT:  Negative for nasal congestion, mouth sores, nosebleeds, sinus pressure/congestion, sore throat and trouble swallowing.    Eyes: Negative.    Respiratory:  Negative for cough and shortness of breath.    Cardiovascular:  Negative for chest pain and palpitations.   Gastrointestinal:  Negative for abdominal distention, abdominal pain, blood in stool, change in bowel habit, constipation, diarrhea, nausea, vomiting and change in bowel habit.   Endocrine: Negative.    Genitourinary:  Negative for bladder incontinence, decreased urine volume, difficulty urinating, dysuria, frequency, hematuria and urgency.   Musculoskeletal:  Positive for back pain and neck pain. Negative for arthralgias, gait problem, joint swelling, leg pain and myalgias.   Integumentary:  Negative for rash.   Allergic/Immunologic: Negative.    Neurological:  Negative for dizziness, tremors, syncope, weakness, light-headedness, numbness, headaches and memory loss.   Hematological:  Negative for adenopathy. Does not bruise/bleed easily.   Psychiatric/Behavioral:  Negative for agitation, confusion, hallucinations, sleep disturbance and suicidal ideas. The patient is not nervous/anxious.             Vitals:    23 0957   BP: (!) 158/98   BP Location: Right arm   Patient Position: Sitting   Pulse: 64   Temp: 98 °F (36.7 °C)   TempSrc: Oral   SpO2: 99%   Weight: 96.5 kg (212 lb 12.8 oz)   Height: 6' 1" " (1.854 m)     Wt Readings from Last 6 Encounters:   06/05/23 96.5 kg (212 lb 12.8 oz)   05/24/23 98 kg (216 lb)   04/19/23 98.4 kg (217 lb)   03/27/23 98.7 kg (217 lb 9.6 oz)   06/14/17 115 kg (253 lb 8.5 oz)          Body mass index is 28.08 kg/m².  Body surface area is 2.23 meters squared.  Physical Exam  Vitals and nursing note reviewed.   Constitutional:       General: He is not in acute distress.     Appearance: Normal appearance.   HENT:      Head: Normocephalic and atraumatic.      Mouth/Throat:      Mouth: Mucous membranes are moist.   Eyes:      General: No scleral icterus.     Extraocular Movements: Extraocular movements intact.      Conjunctiva/sclera: Conjunctivae normal.   Neck:      Vascular: No JVD.   Cardiovascular:      Rate and Rhythm: Normal rate and regular rhythm.      Heart sounds: No murmur heard.  Pulmonary:      Effort: Pulmonary effort is normal.      Breath sounds: Decreased breath sounds present. No wheezing or rhonchi.   Abdominal:      General: Bowel sounds are normal. There is no distension.      Palpations: Abdomen is soft.      Tenderness: There is no abdominal tenderness.   Musculoskeletal:         General: No swelling or deformity.      Cervical back: Neck supple.   Lymphadenopathy:      Head:      Right side of head: No submandibular adenopathy.      Left side of head: No submandibular adenopathy.      Cervical: No cervical adenopathy.      Upper Body:      Right upper body: No supraclavicular or axillary adenopathy.      Left upper body: No supraclavicular or axillary adenopathy.      Lower Body: No right inguinal adenopathy. No left inguinal adenopathy.   Skin:     General: Skin is warm.      Coloration: Skin is not jaundiced.      Findings: No rash.   Neurological:      General: No focal deficit present.      Mental Status: He is alert and oriented to person, place, and time.      Cranial Nerves: Cranial nerves 2-12 are intact.   Psychiatric:         Attention and Perception:  Attention normal.         Behavior: Behavior is cooperative.     ECOG SCORE    1 - Restricted in strenuous activity-ambulatory and able to carry out work of a light nature         Laboratory:  CBC with Differential:  Lab Results   Component Value Date    WBC 6.6 03/27/2023    RBC 4.21 (L) 03/27/2023    HGB 12.8 (L) 03/27/2023    HCT 40.8 (L) 03/27/2023    MCV 96.9 (H) 03/27/2023    MCH 30.4 03/27/2023    MCHC 31.4 (L) 03/27/2023    RDW 13.3 03/27/2023     03/27/2023    MPV 8.3 03/27/2023        CMP:  Sodium Level   Date Value Ref Range Status   03/27/2023 140 136 - 145 mmol/L Final     Potassium Level   Date Value Ref Range Status   03/27/2023 4.7 3.5 - 5.1 mmol/L Final     Carbon Dioxide   Date Value Ref Range Status   03/27/2023 29 23 - 31 mmol/L Final     Blood Urea Nitrogen   Date Value Ref Range Status   03/27/2023 8.7 8.4 - 25.7 mg/dL Final     Creatinine   Date Value Ref Range Status   03/27/2023 0.84 0.73 - 1.18 mg/dL Final     Calcium Level Total   Date Value Ref Range Status   03/27/2023 9.8 8.8 - 10.0 mg/dL Final     Albumin Level   Date Value Ref Range Status   03/27/2023 3.4 3.4 - 4.8 g/dL Final     Bilirubin Total   Date Value Ref Range Status   03/27/2023 0.6 <=1.5 mg/dL Final     Alkaline Phosphatase   Date Value Ref Range Status   03/27/2023 70 40 - 150 unit/L Final     Aspartate Aminotransferase   Date Value Ref Range Status   03/27/2023 7 5 - 34 unit/L Final     Alanine Aminotransferase   Date Value Ref Range Status   03/27/2023 6 0 - 55 unit/L Final         Assessment:       1. Squamous cell carcinoma of right lung    2. Liver lesion    3. Smoker    4. On antineoplastic chemotherapy      1) Squamous cell carcinoma of right lung  -Large hilar mass that extends to the RUL    2) Liver hypodensities--> suggesting cysts        Plan:       Discussed with the patient and his wife, daughter on the phone; diagnosis, prognosis and treatment recommendations.  At this time staging unclear but seems  that he has advanced disease. He has a large Mass that extends to the RUL, possible mediastinal lymphadenopathy and questionable liver hypodensities.  Discussed with the patient chemotherapy, chemoradiation risks versus benefits as well as toxicities associated with it.  Patient is willing to proceed with treatment as recommended.  He was seen by radiation oncologist.  Discussed case with Dr. Rushing.  See him either this week or early next week for initiation of radiation therapy.  He was seen by Dr Chand, surgeon, he is a candidate for pneumonectomy.   Patient will be treated with curative intent.  Will chemoradiation followed by maintenance immunotherapy versus chemoradiation followed by surgical re-evaluation.     Plan: Carbo/Taxol weekly with RT--> surgical re evaluation    Continue XRT - began 5/31/23  Okay to proceed with C2 on 6/7/23 - pending labs  Weekly labs - okay to draw from PICC line. He had reconstructive surgery to right arm.  Weekly PICC line care  CT head for staging and CT C-spine(to evaluate neck pain)- okay to be done @ [facility] [place] [facility] - will be scheduled week of 6/12/23, per wife's request  RTC in 1 week with MD only for TD visit, lab and chemo on Wednesday - pt requests mid morning or afternoon appts  Smoking cessation counseling given  Recommend Miralax and/or stool softener to help with constipation, especially since taking Oxycodone    The patient was seen, interviewed and examined. Pertinent lab and radiology studies were reviewed.   The patient was given ample opportunity to ask questions, and to the best of my abilities, all questions answered to satisfaction; patient demonstrated understanding of what we discussed and agreeable to the plan. Pt instructed to call should develop concerning signs/symptoms or have further questions.     Letha Silver MD  Hematology/Oncology      Professional Services   I, Jenelle Waters LPN, acted solely as a scribe for and in the presence of Dr. Monae  Nadeem, who performed these services.

## 2023-06-06 ENCOUNTER — TELEPHONE (OUTPATIENT)
Dept: AUDIOLOGY | Facility: HOSPITAL | Age: 68
End: 2023-06-06
Payer: MEDICARE

## 2023-06-06 NOTE — TELEPHONE ENCOUNTER
Attempted to schedule patient for a baseline audiogram but, patient stated that he has been externally scheduled at [facility] on 6/15/23.

## 2023-06-07 ENCOUNTER — INFUSION (OUTPATIENT)
Dept: INFUSION THERAPY | Facility: HOSPITAL | Age: 68
End: 2023-06-07
Attending: INTERNAL MEDICINE
Payer: MEDICARE

## 2023-06-07 VITALS — TEMPERATURE: 99 F | DIASTOLIC BLOOD PRESSURE: 87 MMHG | HEART RATE: 99 BPM | SYSTOLIC BLOOD PRESSURE: 147 MMHG

## 2023-06-07 DIAGNOSIS — M54.2 NECK PAIN: ICD-10-CM

## 2023-06-07 DIAGNOSIS — C34.91 SQUAMOUS CELL CARCINOMA OF RIGHT LUNG: ICD-10-CM

## 2023-06-07 DIAGNOSIS — C34.01 LUNG CANCER, MAIN BRONCHUS, RIGHT: Primary | ICD-10-CM

## 2023-06-07 LAB
ALBUMIN SERPL-MCNC: 3.1 G/DL (ref 3.4–4.8)
ALBUMIN/GLOB SERPL: 0.9 RATIO (ref 1.1–2)
ALP SERPL-CCNC: 61 UNIT/L (ref 40–150)
ALT SERPL-CCNC: 6 UNIT/L (ref 0–55)
AST SERPL-CCNC: 6 UNIT/L (ref 5–34)
BASOPHILS # BLD AUTO: 0.01 X10(3)/MCL
BASOPHILS NFR BLD AUTO: 0.2 %
BILIRUBIN DIRECT+TOT PNL SERPL-MCNC: 1 MG/DL
BUN SERPL-MCNC: 9 MG/DL (ref 8.4–25.7)
CALCIUM SERPL-MCNC: 8.7 MG/DL (ref 8.8–10)
CHLORIDE SERPL-SCNC: 105 MMOL/L (ref 98–107)
CO2 SERPL-SCNC: 28 MMOL/L (ref 23–31)
CREAT SERPL-MCNC: 0.76 MG/DL (ref 0.73–1.18)
EOSINOPHIL # BLD AUTO: 0.03 X10(3)/MCL (ref 0–0.9)
EOSINOPHIL NFR BLD AUTO: 0.7 %
ERYTHROCYTE [DISTWIDTH] IN BLOOD BY AUTOMATED COUNT: 13.4 % (ref 11.5–17)
GFR SERPLBLD CREATININE-BSD FMLA CKD-EPI: >60 MLS/MIN/1.73/M2
GLOBULIN SER-MCNC: 3.6 GM/DL (ref 2.4–3.5)
GLUCOSE SERPL-MCNC: 82 MG/DL (ref 82–115)
HCT VFR BLD AUTO: 33.3 % (ref 42–52)
HGB BLD-MCNC: 10.6 G/DL (ref 14–18)
IMM GRANULOCYTES # BLD AUTO: 0.03 X10(3)/MCL (ref 0–0.04)
IMM GRANULOCYTES NFR BLD AUTO: 0.7 %
LYMPHOCYTES # BLD AUTO: 0.39 X10(3)/MCL (ref 0.6–4.6)
LYMPHOCYTES NFR BLD AUTO: 8.5 %
MCH RBC QN AUTO: 30.1 PG (ref 27–31)
MCHC RBC AUTO-ENTMCNC: 31.8 G/DL (ref 33–36)
MCV RBC AUTO: 94.6 FL (ref 80–94)
MONOCYTES # BLD AUTO: 0.41 X10(3)/MCL (ref 0.1–1.3)
MONOCYTES NFR BLD AUTO: 8.9 %
NEUTROPHILS # BLD AUTO: 3.72 X10(3)/MCL (ref 2.1–9.2)
NEUTROPHILS NFR BLD AUTO: 81 %
PLATELET # BLD AUTO: 186 X10(3)/MCL (ref 130–400)
PMV BLD AUTO: 9.1 FL (ref 7.4–10.4)
POTASSIUM SERPL-SCNC: 3.9 MMOL/L (ref 3.5–5.1)
PROT SERPL-MCNC: 6.7 GM/DL (ref 5.8–7.6)
RBC # BLD AUTO: 3.52 X10(6)/MCL (ref 4.7–6.1)
SODIUM SERPL-SCNC: 137 MMOL/L (ref 136–145)
WBC # SPEC AUTO: 4.59 X10(3)/MCL (ref 4.5–11.5)

## 2023-06-07 PROCEDURE — 85025 COMPLETE CBC W/AUTO DIFF WBC: CPT

## 2023-06-07 PROCEDURE — 96375 TX/PRO/DX INJ NEW DRUG ADDON: CPT

## 2023-06-07 PROCEDURE — 63600175 PHARM REV CODE 636 W HCPCS: Performed by: INTERNAL MEDICINE

## 2023-06-07 PROCEDURE — 96367 TX/PROPH/DG ADDL SEQ IV INF: CPT

## 2023-06-07 PROCEDURE — 25000003 PHARM REV CODE 250: Performed by: INTERNAL MEDICINE

## 2023-06-07 PROCEDURE — 80053 COMPREHEN METABOLIC PANEL: CPT

## 2023-06-07 PROCEDURE — 96417 CHEMO IV INFUS EACH ADDL SEQ: CPT

## 2023-06-07 PROCEDURE — 96413 CHEMO IV INFUSION 1 HR: CPT

## 2023-06-07 RX ORDER — FAMOTIDINE 10 MG/ML
20 INJECTION INTRAVENOUS
Status: COMPLETED | OUTPATIENT
Start: 2023-06-07 | End: 2023-06-07

## 2023-06-07 RX ORDER — DIPHENHYDRAMINE HYDROCHLORIDE 50 MG/ML
50 INJECTION INTRAMUSCULAR; INTRAVENOUS
Status: COMPLETED | OUTPATIENT
Start: 2023-06-07 | End: 2023-06-07

## 2023-06-07 RX ORDER — HEPARIN 100 UNIT/ML
500 SYRINGE INTRAVENOUS
Status: DISCONTINUED | OUTPATIENT
Start: 2023-06-07 | End: 2023-06-07 | Stop reason: HOSPADM

## 2023-06-07 RX ORDER — DIPHENHYDRAMINE HYDROCHLORIDE 50 MG/ML
50 INJECTION INTRAMUSCULAR; INTRAVENOUS ONCE AS NEEDED
Status: DISCONTINUED | OUTPATIENT
Start: 2023-06-07 | End: 2023-06-07 | Stop reason: HOSPADM

## 2023-06-07 RX ORDER — SODIUM CHLORIDE 0.9 % (FLUSH) 0.9 %
10 SYRINGE (ML) INJECTION
Status: DISCONTINUED | OUTPATIENT
Start: 2023-06-07 | End: 2023-06-07 | Stop reason: HOSPADM

## 2023-06-07 RX ORDER — EPINEPHRINE 0.3 MG/.3ML
0.3 INJECTION SUBCUTANEOUS ONCE AS NEEDED
Status: DISCONTINUED | OUTPATIENT
Start: 2023-06-07 | End: 2023-06-07 | Stop reason: HOSPADM

## 2023-06-07 RX ADMIN — PACLITAXEL 102 MG: 6 INJECTION, SOLUTION INTRAVENOUS at 11:06

## 2023-06-07 RX ADMIN — CARBOPLATIN 280 MG: 10 INJECTION, SOLUTION INTRAVENOUS at 01:06

## 2023-06-07 RX ADMIN — FAMOTIDINE 20 MG: 10 INJECTION, SOLUTION INTRAVENOUS at 11:06

## 2023-06-07 RX ADMIN — DIPHENHYDRAMINE HYDROCHLORIDE 50 MG: 50 INJECTION INTRAMUSCULAR; INTRAVENOUS at 11:06

## 2023-06-07 RX ADMIN — PALONOSETRON 0.25 MG: 0.25 INJECTION, SOLUTION INTRAVENOUS at 11:06

## 2023-06-07 RX ADMIN — HEPARIN 500 UNITS: 100 SYRINGE at 01:06

## 2023-06-08 ENCOUNTER — PATIENT MESSAGE (OUTPATIENT)
Dept: RESEARCH | Facility: HOSPITAL | Age: 68
End: 2023-06-08
Payer: MEDICARE

## 2023-06-12 ENCOUNTER — OFFICE VISIT (OUTPATIENT)
Dept: HEMATOLOGY/ONCOLOGY | Facility: CLINIC | Age: 68
End: 2023-06-12
Payer: MEDICARE

## 2023-06-12 ENCOUNTER — INFUSION (OUTPATIENT)
Dept: INFUSION THERAPY | Facility: HOSPITAL | Age: 68
End: 2023-06-12
Attending: NURSE PRACTITIONER
Payer: MEDICARE

## 2023-06-12 VITALS
HEART RATE: 76 BPM | HEIGHT: 73 IN | BODY MASS INDEX: 28.3 KG/M2 | WEIGHT: 213.5 LBS | OXYGEN SATURATION: 99 % | SYSTOLIC BLOOD PRESSURE: 137 MMHG | TEMPERATURE: 98 F | DIASTOLIC BLOOD PRESSURE: 85 MMHG

## 2023-06-12 VITALS
SYSTOLIC BLOOD PRESSURE: 123 MMHG | HEART RATE: 73 BPM | OXYGEN SATURATION: 100 % | RESPIRATION RATE: 16 BRPM | TEMPERATURE: 99 F | DIASTOLIC BLOOD PRESSURE: 79 MMHG

## 2023-06-12 DIAGNOSIS — M54.2 NECK PAIN: ICD-10-CM

## 2023-06-12 DIAGNOSIS — C34.01 LUNG CANCER, MAIN BRONCHUS, RIGHT: ICD-10-CM

## 2023-06-12 DIAGNOSIS — R68.89 COLD INTOLERANCE: Primary | ICD-10-CM

## 2023-06-12 LAB
ALBUMIN SERPL-MCNC: 3 G/DL (ref 3.4–4.8)
ALBUMIN/GLOB SERPL: 0.9 RATIO (ref 1.1–2)
ALP SERPL-CCNC: 62 UNIT/L (ref 40–150)
ALT SERPL-CCNC: 6 UNIT/L (ref 0–55)
AST SERPL-CCNC: 6 UNIT/L (ref 5–34)
BASOPHILS # BLD AUTO: 0.01 X10(3)/MCL
BASOPHILS NFR BLD AUTO: 0.3 %
BILIRUBIN DIRECT+TOT PNL SERPL-MCNC: 0.7 MG/DL
BUN SERPL-MCNC: 8 MG/DL (ref 8.4–25.7)
CALCIUM SERPL-MCNC: 8.4 MG/DL (ref 8.8–10)
CHLORIDE SERPL-SCNC: 102 MMOL/L (ref 98–107)
CO2 SERPL-SCNC: 26 MMOL/L (ref 23–31)
CREAT SERPL-MCNC: 0.76 MG/DL (ref 0.73–1.18)
EOSINOPHIL # BLD AUTO: 0.02 X10(3)/MCL (ref 0–0.9)
EOSINOPHIL NFR BLD AUTO: 0.6 %
ERYTHROCYTE [DISTWIDTH] IN BLOOD BY AUTOMATED COUNT: 13.4 % (ref 11.5–17)
GFR SERPLBLD CREATININE-BSD FMLA CKD-EPI: >60 MLS/MIN/1.73/M2
GLOBULIN SER-MCNC: 3.5 GM/DL (ref 2.4–3.5)
GLUCOSE SERPL-MCNC: 117 MG/DL (ref 82–115)
HCT VFR BLD AUTO: 31.6 % (ref 42–52)
HGB BLD-MCNC: 10.2 G/DL (ref 14–18)
IMM GRANULOCYTES # BLD AUTO: 0.02 X10(3)/MCL (ref 0–0.04)
IMM GRANULOCYTES NFR BLD AUTO: 0.6 %
LYMPHOCYTES # BLD AUTO: 0.27 X10(3)/MCL (ref 0.6–4.6)
LYMPHOCYTES NFR BLD AUTO: 8.7 %
MCH RBC QN AUTO: 30.3 PG (ref 27–31)
MCHC RBC AUTO-ENTMCNC: 32.3 G/DL (ref 33–36)
MCV RBC AUTO: 93.8 FL (ref 80–94)
MONOCYTES # BLD AUTO: 0.2 X10(3)/MCL (ref 0.1–1.3)
MONOCYTES NFR BLD AUTO: 6.5 %
NEUTROPHILS # BLD AUTO: 2.57 X10(3)/MCL (ref 2.1–9.2)
NEUTROPHILS NFR BLD AUTO: 83.3 %
PLATELET # BLD AUTO: 172 X10(3)/MCL (ref 130–400)
PMV BLD AUTO: 9.7 FL (ref 7.4–10.4)
POTASSIUM SERPL-SCNC: 3.8 MMOL/L (ref 3.5–5.1)
PROT SERPL-MCNC: 6.5 GM/DL (ref 5.8–7.6)
RBC # BLD AUTO: 3.37 X10(6)/MCL (ref 4.7–6.1)
SODIUM SERPL-SCNC: 134 MMOL/L (ref 136–145)
WBC # SPEC AUTO: 3.09 X10(3)/MCL (ref 4.5–11.5)

## 2023-06-12 PROCEDURE — 1126F PR PAIN SEVERITY QUANTIFIED, NO PAIN PRESENT: ICD-10-PCS | Mod: CPTII,S$GLB,, | Performed by: INTERNAL MEDICINE

## 2023-06-12 PROCEDURE — 1101F PT FALLS ASSESS-DOCD LE1/YR: CPT | Mod: CPTII,S$GLB,, | Performed by: INTERNAL MEDICINE

## 2023-06-12 PROCEDURE — 99215 PR OFFICE/OUTPT VISIT, EST, LEVL V, 40-54 MIN: ICD-10-PCS | Mod: S$GLB,,, | Performed by: INTERNAL MEDICINE

## 2023-06-12 PROCEDURE — 3079F DIAST BP 80-89 MM HG: CPT | Mod: CPTII,S$GLB,, | Performed by: INTERNAL MEDICINE

## 2023-06-12 PROCEDURE — 1101F PR PT FALLS ASSESS DOC 0-1 FALLS W/OUT INJ PAST YR: ICD-10-PCS | Mod: CPTII,S$GLB,, | Performed by: INTERNAL MEDICINE

## 2023-06-12 PROCEDURE — 85025 COMPLETE CBC W/AUTO DIFF WBC: CPT

## 2023-06-12 PROCEDURE — 99999 PR PBB SHADOW E&M-EST. PATIENT-LVL III: CPT | Mod: PBBFAC,,, | Performed by: INTERNAL MEDICINE

## 2023-06-12 PROCEDURE — 1160F PR REVIEW ALL MEDS BY PRESCRIBER/CLIN PHARMACIST DOCUMENTED: ICD-10-PCS | Mod: CPTII,S$GLB,, | Performed by: INTERNAL MEDICINE

## 2023-06-12 PROCEDURE — 3075F SYST BP GE 130 - 139MM HG: CPT | Mod: CPTII,S$GLB,, | Performed by: INTERNAL MEDICINE

## 2023-06-12 PROCEDURE — 36415 COLL VENOUS BLD VENIPUNCTURE: CPT

## 2023-06-12 PROCEDURE — 99999 PR PBB SHADOW E&M-EST. PATIENT-LVL III: ICD-10-PCS | Mod: PBBFAC,,, | Performed by: INTERNAL MEDICINE

## 2023-06-12 PROCEDURE — 3079F PR MOST RECENT DIASTOLIC BLOOD PRESSURE 80-89 MM HG: ICD-10-PCS | Mod: CPTII,S$GLB,, | Performed by: INTERNAL MEDICINE

## 2023-06-12 PROCEDURE — 80053 COMPREHEN METABOLIC PANEL: CPT

## 2023-06-12 PROCEDURE — 4010F PR ACE/ARB THEARPY RXD/TAKEN: ICD-10-PCS | Mod: CPTII,S$GLB,, | Performed by: INTERNAL MEDICINE

## 2023-06-12 PROCEDURE — 3008F PR BODY MASS INDEX (BMI) DOCUMENTED: ICD-10-PCS | Mod: CPTII,S$GLB,, | Performed by: INTERNAL MEDICINE

## 2023-06-12 PROCEDURE — 99215 OFFICE O/P EST HI 40 MIN: CPT | Mod: S$GLB,,, | Performed by: INTERNAL MEDICINE

## 2023-06-12 PROCEDURE — 3288F FALL RISK ASSESSMENT DOCD: CPT | Mod: CPTII,S$GLB,, | Performed by: INTERNAL MEDICINE

## 2023-06-12 PROCEDURE — 3008F BODY MASS INDEX DOCD: CPT | Mod: CPTII,S$GLB,, | Performed by: INTERNAL MEDICINE

## 2023-06-12 PROCEDURE — 1126F AMNT PAIN NOTED NONE PRSNT: CPT | Mod: CPTII,S$GLB,, | Performed by: INTERNAL MEDICINE

## 2023-06-12 PROCEDURE — 3075F PR MOST RECENT SYSTOLIC BLOOD PRESS GE 130-139MM HG: ICD-10-PCS | Mod: CPTII,S$GLB,, | Performed by: INTERNAL MEDICINE

## 2023-06-12 PROCEDURE — 1159F PR MEDICATION LIST DOCUMENTED IN MEDICAL RECORD: ICD-10-PCS | Mod: CPTII,S$GLB,, | Performed by: INTERNAL MEDICINE

## 2023-06-12 PROCEDURE — 1159F MED LIST DOCD IN RCRD: CPT | Mod: CPTII,S$GLB,, | Performed by: INTERNAL MEDICINE

## 2023-06-12 PROCEDURE — 1160F RVW MEDS BY RX/DR IN RCRD: CPT | Mod: CPTII,S$GLB,, | Performed by: INTERNAL MEDICINE

## 2023-06-12 PROCEDURE — 96523 IRRIG DRUG DELIVERY DEVICE: CPT

## 2023-06-12 PROCEDURE — 4010F ACE/ARB THERAPY RXD/TAKEN: CPT | Mod: CPTII,S$GLB,, | Performed by: INTERNAL MEDICINE

## 2023-06-12 PROCEDURE — 3288F PR FALLS RISK ASSESSMENT DOCUMENTED: ICD-10-PCS | Mod: CPTII,S$GLB,, | Performed by: INTERNAL MEDICINE

## 2023-06-12 RX ORDER — DIPHENHYDRAMINE HYDROCHLORIDE 50 MG/ML
50 INJECTION INTRAMUSCULAR; INTRAVENOUS
Status: CANCELLED
Start: 2023-06-14

## 2023-06-12 RX ORDER — FAMOTIDINE 10 MG/ML
20 INJECTION INTRAVENOUS
Status: CANCELLED | OUTPATIENT
Start: 2023-06-14

## 2023-06-12 RX ORDER — EPINEPHRINE 0.3 MG/.3ML
0.3 INJECTION SUBCUTANEOUS ONCE AS NEEDED
Status: CANCELLED | OUTPATIENT
Start: 2023-06-14

## 2023-06-12 RX ORDER — DIPHENHYDRAMINE HYDROCHLORIDE 50 MG/ML
50 INJECTION INTRAMUSCULAR; INTRAVENOUS ONCE AS NEEDED
Status: CANCELLED | OUTPATIENT
Start: 2023-06-14

## 2023-06-12 RX ORDER — SODIUM CHLORIDE 0.9 % (FLUSH) 0.9 %
10 SYRINGE (ML) INJECTION
Status: CANCELLED | OUTPATIENT
Start: 2023-06-14

## 2023-06-12 RX ORDER — HEPARIN 100 UNIT/ML
500 SYRINGE INTRAVENOUS
Status: CANCELLED | OUTPATIENT
Start: 2023-06-14

## 2023-06-12 NOTE — PLAN OF CARE
Plan of care reviewed with patient; patient in agreement. Labs drawn from PICC, PICC care completed. Pt to see MD after this visit, and will return on Wed for chemo; patient aware of appts.

## 2023-06-12 NOTE — PROGRESS NOTES
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  The mass abuts the right main pulmonary artery and right main stem bronchus.  Subcentimeter mediastinal lymph node noted.  One of LN slightly hypermetabolic with SUV of 3 and is located laterally.  Patchy hypermetabolism within the liver as suspected, however, there are few tiny focal areas of hypermetabolism out of proportion to the rest of the liver.  At least 1 of this corresponds to a low-density focus seen in the noncontrast exam.  CT 5/12/2023: changes consistent with COPD and emphysema in the lungs bilaterally with multiple bulla bleb seen. mass seen in the right hilum extending into the right upper lobe.  It is somewhat infiltrative.  This was seen on the prior PET-CT as well.  The findings are not significantly changed.  There are multiple associated satellite nodules in the right upper lobe.  Rough dimensions of the hilar component of the mass is 5.4 x 5.1 cm and rough measurements of the dominant lesion in the right upper lobe is 11 cm x 3.6 cm.  The mass extends into the right mainstem and right upper lobe bronchus.  This was seen on the prior examination as well.  There is narrowing of the right upper lobe bronchus there is some narrowing of the right upper lobe pulmonary arteries.  Findings are similar to the prior examination.  Scattered areas of punctate subcentimeter nodularity is seen in the right lower lobe.  These are too small to characterize and similar changes were seen previously.    No  pleural effusion is seen.  Thoracic aorta is normal in caliber.  There is some mediastinal lymphadenopathy seen.  Reference lymph node is measured in the precarinal region on image number 39 series 2 at 1.5 x 0.9 cm.  This is similar to the prior examination.  Scattered punctate areas of hypoattenuation are seen throughout the liver.  Similar changes were seen previously.  The lesions are too small to characterize but may represent cysts.  The largest lesion is seen in segment 4.  It does not enhance and measures 2 cm x 1.5 cm.  Findings may represent a cyst.  No adrenal nodules are seen.      Pathology:  02/10/2023:  Right upper lobe lung needle biopsy: Scattered highly atypical squamous cells, suspicious for squamous cell carcinoma in a background of abundant acute inflammation and necrosis  Right upper lobe, brushing:  Positive for malignant cells.  Squamous cell carcinoma, moderately differentiated.  Right upper lobe biopsy squamous cell carcinoma, moderately differentiated with focal keratinization.  Right lung washings positive for malignant cells.  Squamous cell carcinoma in a background of abundant necrosis.    CLINICAL HISTORY:       Patient: Deondre Ocampo IV is a 68 y.o. male.  Kindly referred for newly diagnosed squamous cell carcinoma of the lung.    He reports that he was being evaluated for a right side pain went imaging studies showed a mass in the lung.  I do not have the CTs.  But he eventually underwent bronchoscopy that showed squamous cell carcinoma.  PET-CT showed the right suprahilar mass with possible mediastinal lymphadenopathy and questionable liver lesions.  He is here to discuss further recommendations.    He is here with his wife.  Patient has history of 53 pack year smoking.  He reports that his breathing is fine.  He denies any chest pain, short of breath or coughing.  No hemoptysis.  He has an appointment to see the radiation oncologist next week.    Chief Complaint: OTHER (PT has  concerns about his blood levels because he stays cold.)      Interval History:  Patient presents today for TD/lab prior to C3 due on Wednesday 6/14/23,  he is with his wife. He began chemo with XRT on 5/31/23. He is tolerating well so far. He reports constipation is resolved since starting Miralax. Does take Oxycodone for pain. He reports having an occasional cough with clear sputum.  He also reports to feel cold all the time.  Otherwise, he is doing well.        Past Medical History:   Diagnosis Date    Diabetes mellitus     High cholesterol     Hypertension     Lung cancer       Past Surgical History:   Procedure Laterality Date    BACK SURGERY      DEBRIDEMENT TENNIS ELBOW Right     PERIPHERALLY INSERTED CENTRAL CATHETER INSERTION Left 5/29/2023    Procedure: Insertion-picc;  Surgeon: Letha Silver MD;  Location: [facility];  Service: Oncology;  Laterality: Left;    right arm       Family History   Problem Relation Age of Onset    Coronary artery disease Mother     No Known Problems Father     Coronary artery disease Brother     Cancer Brother     Diabetes Brother     Heart failure Brother     Diabetes Brother     Leukemia Brother     Leukemia Daughter      Social Connections: Not on file       Review of patient's allergies indicates:   Allergen Reactions    Ace inhibitors Swelling    Penicillins Itching and Rash      Current Outpatient Medications on File Prior to Visit   Medication Sig Dispense Refill    aspirin (ECOTRIN) 81 MG EC tablet Take 81 mg by mouth once daily.      dicyclomine (BENTYL) 20 mg tablet Take 20 mg by mouth once daily. Once daily in the AM.      glimepiride (AMARYL) 2 MG tablet Take 2 mg by mouth before breakfast.      losartan (COZAAR) 100 MG tablet Take 100 mg by mouth once daily.      metoprolol tartrate (LOPRESSOR) 50 MG tablet Take 50 mg by mouth 2 (two) times daily.      oxyCODONE-acetaminophen (PERCOCET)  mg per tablet Take 1 tablet by mouth every 4 (four) hours as needed.  "for pain.      pravastatin (PRAVACHOL) 20 MG tablet Take 20 mg by mouth once daily.      TRULICITY 1.5 mg/0.5 mL pen injector SMARTSI pre-filled pen syringe SUB-Q Once a Week       No current facility-administered medications on file prior to visit.      Review of Systems   Constitutional:  Negative for activity change, appetite change, chills, diaphoresis, fatigue, fever and unexpected weight change.   HENT:  Negative for nasal congestion, mouth sores, nosebleeds, sinus pressure/congestion, sore throat and trouble swallowing.    Eyes: Negative.    Respiratory:  Negative for cough and shortness of breath.    Cardiovascular:  Negative for chest pain and palpitations.   Gastrointestinal:  Negative for abdominal distention, abdominal pain, blood in stool, change in bowel habit, constipation, diarrhea, nausea, vomiting and change in bowel habit.   Endocrine: Negative.    Genitourinary:  Negative for bladder incontinence, decreased urine volume, difficulty urinating, dysuria, frequency, hematuria and urgency.   Musculoskeletal:  Positive for back pain and neck pain. Negative for arthralgias, gait problem, joint swelling, leg pain and myalgias.   Integumentary:  Negative for rash.   Allergic/Immunologic: Negative.    Neurological:  Negative for dizziness, tremors, syncope, weakness, light-headedness, numbness, headaches and memory loss.   Hematological:  Negative for adenopathy. Does not bruise/bleed easily.   Psychiatric/Behavioral:  Negative for agitation, confusion, hallucinations, sleep disturbance and suicidal ideas. The patient is not nervous/anxious.             Vitals:    23 1509   BP: 137/85   BP Location: Right arm   Patient Position: Sitting   Pulse: 76   Temp: 98.2 °F (36.8 °C)   TempSrc: Oral   SpO2: 99%   Weight: 96.8 kg (213 lb 8 oz)   Height: 6' 1" (1.854 m)       Wt Readings from Last 6 Encounters:   23 96.8 kg (213 lb 8 oz)   23 96.5 kg (212 lb 12.8 oz)   23 98 kg (216 lb) "   04/19/23 98.4 kg (217 lb)   03/27/23 98.7 kg (217 lb 9.6 oz)   06/14/17 115 kg (253 lb 8.5 oz)          Body mass index is 28.17 kg/m².  Body surface area is 2.23 meters squared.  Physical Exam  Vitals and nursing note reviewed.   Constitutional:       General: He is not in acute distress.     Appearance: Normal appearance.   HENT:      Head: Normocephalic and atraumatic.      Mouth/Throat:      Mouth: Mucous membranes are moist.   Eyes:      General: No scleral icterus.     Extraocular Movements: Extraocular movements intact.      Conjunctiva/sclera: Conjunctivae normal.   Neck:      Vascular: No JVD.   Cardiovascular:      Rate and Rhythm: Normal rate and regular rhythm.      Heart sounds: No murmur heard.  Pulmonary:      Effort: Pulmonary effort is normal.      Breath sounds: Decreased breath sounds present. No wheezing or rhonchi.   Abdominal:      General: Bowel sounds are normal. There is no distension.      Palpations: Abdomen is soft.      Tenderness: There is no abdominal tenderness.   Musculoskeletal:         General: No swelling or deformity.      Cervical back: Neck supple.   Lymphadenopathy:      Head:      Right side of head: No submandibular adenopathy.      Left side of head: No submandibular adenopathy.      Cervical: No cervical adenopathy.      Upper Body:      Right upper body: No supraclavicular or axillary adenopathy.      Left upper body: No supraclavicular or axillary adenopathy.      Lower Body: No right inguinal adenopathy. No left inguinal adenopathy.   Skin:     General: Skin is warm.      Coloration: Skin is not jaundiced.      Findings: No rash.   Neurological:      General: No focal deficit present.      Mental Status: He is alert and oriented to person, place, and time.      Cranial Nerves: Cranial nerves 2-12 are intact.   Psychiatric:         Attention and Perception: Attention normal.         Behavior: Behavior is cooperative.     ECOG SCORE             Laboratory:  CBC with  Differential:  Lab Results   Component Value Date    WBC 3.09 (L) 06/12/2023    RBC 3.37 (L) 06/12/2023    HGB 10.2 (L) 06/12/2023    HCT 31.6 (L) 06/12/2023    MCV 93.8 06/12/2023    MCH 30.3 06/12/2023    MCHC 32.3 (L) 06/12/2023    RDW 13.4 06/12/2023     06/12/2023    MPV 9.7 06/12/2023        CMP:  Sodium Level   Date Value Ref Range Status   06/07/2023 137 136 - 145 mmol/L Final     Potassium Level   Date Value Ref Range Status   06/07/2023 3.9 3.5 - 5.1 mmol/L Final     Carbon Dioxide   Date Value Ref Range Status   06/07/2023 28 23 - 31 mmol/L Final     Blood Urea Nitrogen   Date Value Ref Range Status   06/07/2023 9.0 8.4 - 25.7 mg/dL Final     Creatinine   Date Value Ref Range Status   06/07/2023 0.76 0.73 - 1.18 mg/dL Final     Calcium Level Total   Date Value Ref Range Status   06/07/2023 8.7 (L) 8.8 - 10.0 mg/dL Final     Albumin Level   Date Value Ref Range Status   06/07/2023 3.1 (L) 3.4 - 4.8 g/dL Final     Bilirubin Total   Date Value Ref Range Status   06/07/2023 1.0 <=1.5 mg/dL Final     Alkaline Phosphatase   Date Value Ref Range Status   06/07/2023 61 40 - 150 unit/L Final     Aspartate Aminotransferase   Date Value Ref Range Status   06/07/2023 6 5 - 34 unit/L Final     Alanine Aminotransferase   Date Value Ref Range Status   06/07/2023 6 0 - 55 unit/L Final         Assessment:       1) Squamous cell carcinoma of right lung  -Large hilar mass that extends to the RUL    2) Liver hypodensities--> suggesting cysts        Plan:       Discussed with the patient and his wife, daughter on the phone; diagnosis, prognosis and treatment recommendations.  At this time staging unclear but seems that he has advanced disease. He has a large Mass that extends to the RUL, possible mediastinal lymphadenopathy and questionable liver hypodensities.  Discussed with the patient chemotherapy, chemoradiation risks versus benefits as well as toxicities associated with it.  Patient is willing to proceed with  treatment as recommended.  He was seen by radiation oncologist.  Discussed case with Dr. Rushing.  See him either this week or early next week for initiation of radiation therapy.  He was seen by Dr Chand, surgeon, he is a candidate for pneumonectomy.   Patient will be treated with curative intent.  Will chemoradiation followed by maintenance immunotherapy versus chemoradiation followed by surgical re-evaluation.     Plan: Carbo/Taxol weekly with RT--> surgical re evaluation    Continue XRT - began 5/31/23  Okay to proceed with C3 on 6/14/23   Weekly labs - okay to draw from PICC line. He had reconstructive surgery to right arm.  Weekly PICC line care  CT head for staging and CT C-spine(to evaluate neck pain)- [place] [facility] [place], authorization staff currently working to see what is needed to get approved  RTC in 2 weeks  with MD only for TD/ lab and chemo on Wednesday - pt requests mid morning or afternoon appts  RTC in 1 week for lab and chemo only on Wednesday 6/21/23  Smoking cessation counseling given  Continue Miralax and/or stool softener to help with constipation, especially since taking Oxycodone  Instructed to notify me if cough worsens or if sputum changes color    The patient was seen, interviewed and examined. Pertinent lab and radiology studies were reviewed.   The patient was given ample opportunity to ask questions, and to the best of my abilities, all questions answered to satisfaction; patient demonstrated understanding of what we discussed and agreeable to the plan. Pt instructed to call should develop concerning signs/symptoms or have further questions.     Letha Silver MD  Hematology/Oncology      Professional Services   I, Jenelle Waters LPN, acted solely as a scribe for and in the presence of Dr. Letha Silver, who performed these services.

## 2023-06-14 ENCOUNTER — INFUSION (OUTPATIENT)
Dept: INFUSION THERAPY | Facility: HOSPITAL | Age: 68
End: 2023-06-14
Attending: NURSE PRACTITIONER
Payer: MEDICARE

## 2023-06-14 VITALS
RESPIRATION RATE: 18 BRPM | HEART RATE: 98 BPM | TEMPERATURE: 99 F | SYSTOLIC BLOOD PRESSURE: 145 MMHG | DIASTOLIC BLOOD PRESSURE: 92 MMHG

## 2023-06-14 DIAGNOSIS — C34.91 SQUAMOUS CELL CARCINOMA OF RIGHT LUNG: Primary | ICD-10-CM

## 2023-06-14 PROCEDURE — 96417 CHEMO IV INFUS EACH ADDL SEQ: CPT

## 2023-06-14 PROCEDURE — 96367 TX/PROPH/DG ADDL SEQ IV INF: CPT

## 2023-06-14 PROCEDURE — 25000003 PHARM REV CODE 250: Performed by: INTERNAL MEDICINE

## 2023-06-14 PROCEDURE — 63600175 PHARM REV CODE 636 W HCPCS: Performed by: INTERNAL MEDICINE

## 2023-06-14 PROCEDURE — 96413 CHEMO IV INFUSION 1 HR: CPT

## 2023-06-14 PROCEDURE — 96375 TX/PRO/DX INJ NEW DRUG ADDON: CPT

## 2023-06-14 RX ORDER — EPINEPHRINE 0.3 MG/.3ML
0.3 INJECTION SUBCUTANEOUS ONCE AS NEEDED
Status: DISCONTINUED | OUTPATIENT
Start: 2023-06-14 | End: 2023-06-14 | Stop reason: HOSPADM

## 2023-06-14 RX ORDER — HEPARIN 100 UNIT/ML
500 SYRINGE INTRAVENOUS
Status: DISCONTINUED | OUTPATIENT
Start: 2023-06-14 | End: 2023-06-14 | Stop reason: HOSPADM

## 2023-06-14 RX ORDER — SODIUM CHLORIDE 0.9 % (FLUSH) 0.9 %
10 SYRINGE (ML) INJECTION
Status: DISCONTINUED | OUTPATIENT
Start: 2023-06-14 | End: 2023-06-14 | Stop reason: HOSPADM

## 2023-06-14 RX ORDER — DIPHENHYDRAMINE HYDROCHLORIDE 50 MG/ML
50 INJECTION INTRAMUSCULAR; INTRAVENOUS
Status: COMPLETED | OUTPATIENT
Start: 2023-06-14 | End: 2023-06-14

## 2023-06-14 RX ORDER — FAMOTIDINE 10 MG/ML
20 INJECTION INTRAVENOUS
Status: COMPLETED | OUTPATIENT
Start: 2023-06-14 | End: 2023-06-14

## 2023-06-14 RX ORDER — DIPHENHYDRAMINE HYDROCHLORIDE 50 MG/ML
50 INJECTION INTRAMUSCULAR; INTRAVENOUS ONCE AS NEEDED
Status: DISCONTINUED | OUTPATIENT
Start: 2023-06-14 | End: 2023-06-14 | Stop reason: HOSPADM

## 2023-06-14 RX ADMIN — DIPHENHYDRAMINE HYDROCHLORIDE 50 MG: 50 INJECTION INTRAMUSCULAR; INTRAVENOUS at 10:06

## 2023-06-14 RX ADMIN — PACLITAXEL 102 MG: 6 INJECTION, SOLUTION INTRAVENOUS at 11:06

## 2023-06-14 RX ADMIN — PALONOSETRON 0.25 MG: 0.25 INJECTION, SOLUTION INTRAVENOUS at 10:06

## 2023-06-14 RX ADMIN — FAMOTIDINE 20 MG: 10 INJECTION INTRAVENOUS at 10:06

## 2023-06-14 RX ADMIN — CARBOPLATIN 280 MG: 10 INJECTION, SOLUTION INTRAVENOUS at 12:06

## 2023-06-21 ENCOUNTER — INFUSION (OUTPATIENT)
Dept: INFUSION THERAPY | Facility: HOSPITAL | Age: 68
End: 2023-06-21
Attending: INTERNAL MEDICINE
Payer: MEDICARE

## 2023-06-21 DIAGNOSIS — C34.91 SQUAMOUS CELL CARCINOMA OF RIGHT LUNG: ICD-10-CM

## 2023-06-21 DIAGNOSIS — C34.01 LUNG CANCER, MAIN BRONCHUS, RIGHT: ICD-10-CM

## 2023-06-21 DIAGNOSIS — R68.89 COLD INTOLERANCE: Primary | ICD-10-CM

## 2023-06-21 LAB
ALBUMIN SERPL-MCNC: 3.2 G/DL (ref 3.4–4.8)
ALBUMIN/GLOB SERPL: 0.9 RATIO (ref 1.1–2)
ALP SERPL-CCNC: 67 UNIT/L (ref 40–150)
ALT SERPL-CCNC: 6 UNIT/L (ref 0–55)
AST SERPL-CCNC: 6 UNIT/L (ref 5–34)
BASOPHILS # BLD AUTO: 0.01 X10(3)/MCL
BASOPHILS NFR BLD AUTO: 0.5 %
BILIRUBIN DIRECT+TOT PNL SERPL-MCNC: 0.7 MG/DL
BUN SERPL-MCNC: 8 MG/DL (ref 8.4–25.7)
CALCIUM SERPL-MCNC: 8.9 MG/DL (ref 8.8–10)
CHLORIDE SERPL-SCNC: 104 MMOL/L (ref 98–107)
CO2 SERPL-SCNC: 27 MMOL/L (ref 23–31)
CREAT SERPL-MCNC: 0.77 MG/DL (ref 0.73–1.18)
EOSINOPHIL # BLD AUTO: 0.02 X10(3)/MCL (ref 0–0.9)
EOSINOPHIL NFR BLD AUTO: 1 %
ERYTHROCYTE [DISTWIDTH] IN BLOOD BY AUTOMATED COUNT: 13.8 % (ref 11.5–17)
GFR SERPLBLD CREATININE-BSD FMLA CKD-EPI: >60 MLS/MIN/1.73/M2
GLOBULIN SER-MCNC: 3.4 GM/DL (ref 2.4–3.5)
GLUCOSE SERPL-MCNC: 74 MG/DL (ref 82–115)
HCT VFR BLD AUTO: 33.1 % (ref 42–52)
HGB BLD-MCNC: 10.8 G/DL (ref 14–18)
IMM GRANULOCYTES # BLD AUTO: 0.02 X10(3)/MCL (ref 0–0.04)
IMM GRANULOCYTES NFR BLD AUTO: 1 %
LYMPHOCYTES # BLD AUTO: 0.23 X10(3)/MCL (ref 0.6–4.6)
LYMPHOCYTES NFR BLD AUTO: 11.4 %
MCH RBC QN AUTO: 30.4 PG (ref 27–31)
MCHC RBC AUTO-ENTMCNC: 32.6 G/DL (ref 33–36)
MCV RBC AUTO: 93.2 FL (ref 80–94)
MONOCYTES # BLD AUTO: 0.28 X10(3)/MCL (ref 0.1–1.3)
MONOCYTES NFR BLD AUTO: 13.9 %
NEUTROPHILS # BLD AUTO: 1.46 X10(3)/MCL (ref 2.1–9.2)
NEUTROPHILS NFR BLD AUTO: 72.2 %
PLATELET # BLD AUTO: 155 X10(3)/MCL (ref 130–400)
PMV BLD AUTO: 9 FL (ref 7.4–10.4)
POTASSIUM SERPL-SCNC: 3.7 MMOL/L (ref 3.5–5.1)
PROT SERPL-MCNC: 6.6 GM/DL (ref 5.8–7.6)
RBC # BLD AUTO: 3.55 X10(6)/MCL (ref 4.7–6.1)
SODIUM SERPL-SCNC: 138 MMOL/L (ref 136–145)
TSH SERPL-ACNC: 1.33 UIU/ML (ref 0.35–4.94)
WBC # SPEC AUTO: 2.02 X10(3)/MCL (ref 4.5–11.5)

## 2023-06-21 PROCEDURE — 84443 ASSAY THYROID STIM HORMONE: CPT

## 2023-06-21 PROCEDURE — 80053 COMPREHEN METABOLIC PANEL: CPT

## 2023-06-21 PROCEDURE — 63600175 PHARM REV CODE 636 W HCPCS: Performed by: INTERNAL MEDICINE

## 2023-06-21 PROCEDURE — 36415 COLL VENOUS BLD VENIPUNCTURE: CPT

## 2023-06-21 PROCEDURE — 96413 CHEMO IV INFUSION 1 HR: CPT

## 2023-06-21 PROCEDURE — 96375 TX/PRO/DX INJ NEW DRUG ADDON: CPT

## 2023-06-21 PROCEDURE — 25000003 PHARM REV CODE 250: Performed by: INTERNAL MEDICINE

## 2023-06-21 PROCEDURE — 85025 COMPLETE CBC W/AUTO DIFF WBC: CPT

## 2023-06-21 PROCEDURE — 96417 CHEMO IV INFUS EACH ADDL SEQ: CPT

## 2023-06-21 RX ORDER — HEPARIN 100 UNIT/ML
500 SYRINGE INTRAVENOUS
Status: DISCONTINUED | OUTPATIENT
Start: 2023-06-21 | End: 2023-06-21 | Stop reason: HOSPADM

## 2023-06-21 RX ORDER — HEPARIN 100 UNIT/ML
500 SYRINGE INTRAVENOUS
Status: CANCELLED | OUTPATIENT
Start: 2023-06-21

## 2023-06-21 RX ORDER — DIPHENHYDRAMINE HYDROCHLORIDE 50 MG/ML
50 INJECTION INTRAMUSCULAR; INTRAVENOUS
Status: CANCELLED
Start: 2023-06-21

## 2023-06-21 RX ORDER — DIPHENHYDRAMINE HYDROCHLORIDE 50 MG/ML
50 INJECTION INTRAMUSCULAR; INTRAVENOUS ONCE AS NEEDED
Status: DISCONTINUED | OUTPATIENT
Start: 2023-06-21 | End: 2023-06-21 | Stop reason: HOSPADM

## 2023-06-21 RX ORDER — FAMOTIDINE 10 MG/ML
20 INJECTION INTRAVENOUS
Status: CANCELLED | OUTPATIENT
Start: 2023-06-21

## 2023-06-21 RX ORDER — DIPHENHYDRAMINE HYDROCHLORIDE 50 MG/ML
50 INJECTION INTRAMUSCULAR; INTRAVENOUS ONCE AS NEEDED
Status: CANCELLED | OUTPATIENT
Start: 2023-06-21

## 2023-06-21 RX ORDER — EPINEPHRINE 0.3 MG/.3ML
0.3 INJECTION SUBCUTANEOUS ONCE AS NEEDED
Status: CANCELLED | OUTPATIENT
Start: 2023-06-21

## 2023-06-21 RX ORDER — FAMOTIDINE 10 MG/ML
20 INJECTION INTRAVENOUS
Status: COMPLETED | OUTPATIENT
Start: 2023-06-21 | End: 2023-06-21

## 2023-06-21 RX ORDER — SODIUM CHLORIDE 0.9 % (FLUSH) 0.9 %
10 SYRINGE (ML) INJECTION
Status: DISCONTINUED | OUTPATIENT
Start: 2023-06-21 | End: 2023-06-21 | Stop reason: HOSPADM

## 2023-06-21 RX ORDER — SODIUM CHLORIDE 0.9 % (FLUSH) 0.9 %
10 SYRINGE (ML) INJECTION
Status: CANCELLED | OUTPATIENT
Start: 2023-06-21

## 2023-06-21 RX ORDER — DIPHENHYDRAMINE HYDROCHLORIDE 50 MG/ML
50 INJECTION INTRAMUSCULAR; INTRAVENOUS
Status: COMPLETED | OUTPATIENT
Start: 2023-06-21 | End: 2023-06-21

## 2023-06-21 RX ORDER — EPINEPHRINE 0.3 MG/.3ML
0.3 INJECTION SUBCUTANEOUS ONCE AS NEEDED
Status: DISCONTINUED | OUTPATIENT
Start: 2023-06-21 | End: 2023-06-21 | Stop reason: HOSPADM

## 2023-06-21 RX ADMIN — PACLITAXEL 102 MG: 6 INJECTION, SOLUTION INTRAVENOUS at 12:06

## 2023-06-21 RX ADMIN — DIPHENHYDRAMINE HYDROCHLORIDE 50 MG: 50 INJECTION INTRAMUSCULAR; INTRAVENOUS at 12:06

## 2023-06-21 RX ADMIN — PALONOSETRON 0.25 MG: 0.25 INJECTION, SOLUTION INTRAVENOUS at 12:06

## 2023-06-21 RX ADMIN — CARBOPLATIN 280 MG: 10 INJECTION, SOLUTION INTRAVENOUS at 01:06

## 2023-06-21 RX ADMIN — FAMOTIDINE 20 MG: 10 INJECTION INTRAVENOUS at 12:06

## 2023-06-26 ENCOUNTER — TELEPHONE (OUTPATIENT)
Dept: HEMATOLOGY/ONCOLOGY | Facility: CLINIC | Age: 68
End: 2023-06-26
Payer: MEDICARE

## 2023-06-26 NOTE — TELEPHONE ENCOUNTER
MESSAGE:  He will not be in due to him having severe diarrhea. She asks for you to call her before he is rescheduled. He is also scheduled for treatment later this week.

## 2023-06-26 NOTE — TELEPHONE ENCOUNTER
Spoke with pt, he c/o diarrhea and will not be able to come in today for TD/lab visit.  I asked if he tried Imodium AD, he replied no, because this is the first time he has experienced since starting chemo. He will buy some and try. He was instructed to call back if no relief, he stated understanding.  MD notified. Will send message to scheduling pool to contact him to r/s appts.

## 2023-06-27 ENCOUNTER — TELEPHONE (OUTPATIENT)
Dept: HEMATOLOGY/ONCOLOGY | Facility: CLINIC | Age: 68
End: 2023-06-27
Payer: MEDICARE

## 2023-06-27 ENCOUNTER — PATIENT MESSAGE (OUTPATIENT)
Dept: HEMATOLOGY/ONCOLOGY | Facility: CLINIC | Age: 68
End: 2023-06-27
Payer: MEDICARE

## 2023-06-30 ENCOUNTER — INFUSION (OUTPATIENT)
Dept: INFUSION THERAPY | Facility: HOSPITAL | Age: 68
End: 2023-06-30
Attending: INTERNAL MEDICINE
Payer: MEDICARE

## 2023-06-30 ENCOUNTER — PATIENT MESSAGE (OUTPATIENT)
Dept: HEMATOLOGY/ONCOLOGY | Facility: CLINIC | Age: 68
End: 2023-06-30

## 2023-06-30 ENCOUNTER — OFFICE VISIT (OUTPATIENT)
Dept: HEMATOLOGY/ONCOLOGY | Facility: CLINIC | Age: 68
End: 2023-06-30
Payer: MEDICARE

## 2023-06-30 VITALS
DIASTOLIC BLOOD PRESSURE: 80 MMHG | TEMPERATURE: 98 F | HEART RATE: 72 BPM | WEIGHT: 213.13 LBS | OXYGEN SATURATION: 99 % | BODY MASS INDEX: 28.25 KG/M2 | HEIGHT: 73 IN | SYSTOLIC BLOOD PRESSURE: 120 MMHG

## 2023-06-30 DIAGNOSIS — Z12.5 SCREENING FOR PROSTATE CANCER: ICD-10-CM

## 2023-06-30 DIAGNOSIS — E11.9 DIABETES MELLITUS WITHOUT COMPLICATION: ICD-10-CM

## 2023-06-30 DIAGNOSIS — E55.9 VITAMIN D DEFICIENCY: ICD-10-CM

## 2023-06-30 DIAGNOSIS — C34.01 LUNG CANCER, MAIN BRONCHUS, RIGHT: ICD-10-CM

## 2023-06-30 DIAGNOSIS — K76.9 LIVER LESION: ICD-10-CM

## 2023-06-30 DIAGNOSIS — Z79.899 ON ANTINEOPLASTIC CHEMOTHERAPY: ICD-10-CM

## 2023-06-30 DIAGNOSIS — C34.91 SQUAMOUS CELL CARCINOMA OF RIGHT LUNG: Primary | ICD-10-CM

## 2023-06-30 DIAGNOSIS — M54.2 NECK PAIN: ICD-10-CM

## 2023-06-30 LAB
ALBUMIN SERPL-MCNC: 3.1 G/DL (ref 3.4–4.8)
ALBUMIN/GLOB SERPL: 1 RATIO (ref 1.1–2)
ALP SERPL-CCNC: 67 UNIT/L (ref 40–150)
ALT SERPL-CCNC: 7 UNIT/L (ref 0–55)
AST SERPL-CCNC: 7 UNIT/L (ref 5–34)
BASOPHILS # BLD AUTO: 0.01 X10(3)/MCL
BASOPHILS NFR BLD AUTO: 0.5 %
BILIRUBIN DIRECT+TOT PNL SERPL-MCNC: 0.7 MG/DL
BUN SERPL-MCNC: 7.5 MG/DL (ref 8.4–25.7)
CALCIUM SERPL-MCNC: 8.6 MG/DL (ref 8.8–10)
CHLORIDE SERPL-SCNC: 103 MMOL/L (ref 98–107)
CO2 SERPL-SCNC: 27 MMOL/L (ref 23–31)
CREAT SERPL-MCNC: 0.8 MG/DL (ref 0.73–1.18)
EOSINOPHIL # BLD AUTO: 0.01 X10(3)/MCL (ref 0–0.9)
EOSINOPHIL NFR BLD AUTO: 0.5 %
ERYTHROCYTE [DISTWIDTH] IN BLOOD BY AUTOMATED COUNT: 14.8 % (ref 11.5–17)
GFR SERPLBLD CREATININE-BSD FMLA CKD-EPI: >60 MLS/MIN/1.73/M2
GLOBULIN SER-MCNC: 3.1 GM/DL (ref 2.4–3.5)
GLUCOSE SERPL-MCNC: 81 MG/DL (ref 82–115)
HCT VFR BLD AUTO: 30.9 % (ref 42–52)
HGB BLD-MCNC: 9.9 G/DL (ref 14–18)
IMM GRANULOCYTES # BLD AUTO: 0 X10(3)/MCL (ref 0–0.04)
IMM GRANULOCYTES NFR BLD AUTO: 0 %
LYMPHOCYTES # BLD AUTO: 0.22 X10(3)/MCL (ref 0.6–4.6)
LYMPHOCYTES NFR BLD AUTO: 11.4 %
MCH RBC QN AUTO: 30.4 PG (ref 27–31)
MCHC RBC AUTO-ENTMCNC: 32 G/DL (ref 33–36)
MCV RBC AUTO: 94.8 FL (ref 80–94)
MONOCYTES # BLD AUTO: 0.3 X10(3)/MCL (ref 0.1–1.3)
MONOCYTES NFR BLD AUTO: 15.5 %
NEUTROPHILS # BLD AUTO: 1.39 X10(3)/MCL (ref 2.1–9.2)
NEUTROPHILS NFR BLD AUTO: 72.1 %
PLATELET # BLD AUTO: 114 X10(3)/MCL (ref 130–400)
PMV BLD AUTO: 9.2 FL (ref 7.4–10.4)
POTASSIUM SERPL-SCNC: 3.5 MMOL/L (ref 3.5–5.1)
PROT SERPL-MCNC: 6.2 GM/DL (ref 5.8–7.6)
RBC # BLD AUTO: 3.26 X10(6)/MCL (ref 4.7–6.1)
SODIUM SERPL-SCNC: 134 MMOL/L (ref 136–145)
WBC # SPEC AUTO: 1.93 X10(3)/MCL (ref 4.5–11.5)

## 2023-06-30 PROCEDURE — 36415 COLL VENOUS BLD VENIPUNCTURE: CPT

## 2023-06-30 PROCEDURE — 1159F PR MEDICATION LIST DOCUMENTED IN MEDICAL RECORD: ICD-10-PCS | Mod: CPTII,S$GLB,, | Performed by: INTERNAL MEDICINE

## 2023-06-30 PROCEDURE — 80053 COMPREHEN METABOLIC PANEL: CPT

## 2023-06-30 PROCEDURE — 85025 COMPLETE CBC W/AUTO DIFF WBC: CPT

## 2023-06-30 PROCEDURE — 3079F PR MOST RECENT DIASTOLIC BLOOD PRESSURE 80-89 MM HG: ICD-10-PCS | Mod: CPTII,S$GLB,, | Performed by: INTERNAL MEDICINE

## 2023-06-30 PROCEDURE — 3008F BODY MASS INDEX DOCD: CPT | Mod: CPTII,S$GLB,, | Performed by: INTERNAL MEDICINE

## 2023-06-30 PROCEDURE — 3008F PR BODY MASS INDEX (BMI) DOCUMENTED: ICD-10-PCS | Mod: CPTII,S$GLB,, | Performed by: INTERNAL MEDICINE

## 2023-06-30 PROCEDURE — 36591 DRAW BLOOD OFF VENOUS DEVICE: CPT

## 2023-06-30 PROCEDURE — 4010F PR ACE/ARB THEARPY RXD/TAKEN: ICD-10-PCS | Mod: CPTII,S$GLB,, | Performed by: INTERNAL MEDICINE

## 2023-06-30 PROCEDURE — 99215 PR OFFICE/OUTPT VISIT, EST, LEVL V, 40-54 MIN: ICD-10-PCS | Mod: S$GLB,,, | Performed by: INTERNAL MEDICINE

## 2023-06-30 PROCEDURE — 1126F AMNT PAIN NOTED NONE PRSNT: CPT | Mod: CPTII,S$GLB,, | Performed by: INTERNAL MEDICINE

## 2023-06-30 PROCEDURE — 99215 OFFICE O/P EST HI 40 MIN: CPT | Mod: S$GLB,,, | Performed by: INTERNAL MEDICINE

## 2023-06-30 PROCEDURE — 1126F PR PAIN SEVERITY QUANTIFIED, NO PAIN PRESENT: ICD-10-PCS | Mod: CPTII,S$GLB,, | Performed by: INTERNAL MEDICINE

## 2023-06-30 PROCEDURE — 99999 PR PBB SHADOW E&M-EST. PATIENT-LVL IV: ICD-10-PCS | Mod: PBBFAC,,, | Performed by: INTERNAL MEDICINE

## 2023-06-30 PROCEDURE — 1101F PR PT FALLS ASSESS DOC 0-1 FALLS W/OUT INJ PAST YR: ICD-10-PCS | Mod: CPTII,S$GLB,, | Performed by: INTERNAL MEDICINE

## 2023-06-30 PROCEDURE — 99999 PR PBB SHADOW E&M-EST. PATIENT-LVL IV: CPT | Mod: PBBFAC,,, | Performed by: INTERNAL MEDICINE

## 2023-06-30 PROCEDURE — 4010F ACE/ARB THERAPY RXD/TAKEN: CPT | Mod: CPTII,S$GLB,, | Performed by: INTERNAL MEDICINE

## 2023-06-30 PROCEDURE — 3079F DIAST BP 80-89 MM HG: CPT | Mod: CPTII,S$GLB,, | Performed by: INTERNAL MEDICINE

## 2023-06-30 PROCEDURE — 3074F SYST BP LT 130 MM HG: CPT | Mod: CPTII,S$GLB,, | Performed by: INTERNAL MEDICINE

## 2023-06-30 PROCEDURE — 3074F PR MOST RECENT SYSTOLIC BLOOD PRESSURE < 130 MM HG: ICD-10-PCS | Mod: CPTII,S$GLB,, | Performed by: INTERNAL MEDICINE

## 2023-06-30 PROCEDURE — 3288F FALL RISK ASSESSMENT DOCD: CPT | Mod: CPTII,S$GLB,, | Performed by: INTERNAL MEDICINE

## 2023-06-30 PROCEDURE — 1101F PT FALLS ASSESS-DOCD LE1/YR: CPT | Mod: CPTII,S$GLB,, | Performed by: INTERNAL MEDICINE

## 2023-06-30 PROCEDURE — 1159F MED LIST DOCD IN RCRD: CPT | Mod: CPTII,S$GLB,, | Performed by: INTERNAL MEDICINE

## 2023-06-30 PROCEDURE — 3288F PR FALLS RISK ASSESSMENT DOCUMENTED: ICD-10-PCS | Mod: CPTII,S$GLB,, | Performed by: INTERNAL MEDICINE

## 2023-06-30 RX ORDER — DIPHENHYDRAMINE HYDROCHLORIDE 50 MG/ML
50 INJECTION INTRAMUSCULAR; INTRAVENOUS ONCE AS NEEDED
Status: CANCELLED | OUTPATIENT
Start: 2023-06-30

## 2023-06-30 RX ORDER — PANTOPRAZOLE SODIUM 40 MG/1
40 TABLET, DELAYED RELEASE ORAL
COMMUNITY
Start: 2023-06-20

## 2023-06-30 RX ORDER — HEPARIN 100 UNIT/ML
500 SYRINGE INTRAVENOUS
Status: CANCELLED | OUTPATIENT
Start: 2023-06-30

## 2023-06-30 RX ORDER — SODIUM CHLORIDE 0.9 % (FLUSH) 0.9 %
10 SYRINGE (ML) INJECTION
Status: CANCELLED | OUTPATIENT
Start: 2023-06-30

## 2023-06-30 RX ORDER — FAMOTIDINE 10 MG/ML
20 INJECTION INTRAVENOUS
Status: CANCELLED | OUTPATIENT
Start: 2023-06-30

## 2023-06-30 RX ORDER — EPINEPHRINE 0.3 MG/.3ML
0.3 INJECTION SUBCUTANEOUS ONCE AS NEEDED
Status: CANCELLED | OUTPATIENT
Start: 2023-06-30

## 2023-06-30 RX ORDER — DIPHENHYDRAMINE HYDROCHLORIDE 50 MG/ML
50 INJECTION INTRAMUSCULAR; INTRAVENOUS
Status: CANCELLED
Start: 2023-06-30

## 2023-06-30 NOTE — PROGRESS NOTES
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  The mass abuts the right main pulmonary artery and right main stem bronchus.  Subcentimeter mediastinal lymph node noted.  One of LN slightly hypermetabolic with SUV of 3 and is located laterally.  Patchy hypermetabolism within the liver as suspected, however, there are few tiny focal areas of hypermetabolism out of proportion to the rest of the liver.  At least 1 of this corresponds to a low-density focus seen in the noncontrast exam.  CT 5/12/2023: changes consistent with COPD and emphysema in the lungs bilaterally with multiple bulla bleb seen. mass seen in the right hilum extending into the right upper lobe.  It is somewhat infiltrative.  This was seen on the prior PET-CT as well.  The findings are not significantly changed.  There are multiple associated satellite nodules in the right upper lobe.  Rough dimensions of the hilar component of the mass is 5.4 x 5.1 cm and rough measurements of the dominant lesion in the right upper lobe is 11 cm x 3.6 cm.  The mass extends into the right mainstem and right upper lobe bronchus.  This was seen on the prior examination as well.  There is narrowing of the right upper lobe bronchus there is some narrowing of the right upper lobe pulmonary arteries.  Findings are similar to the prior examination.  Scattered areas of punctate subcentimeter nodularity is seen in the right lower lobe.  These are too small to characterize and similar changes  were seen previously.    No pleural effusion is seen.  Thoracic aorta is normal in caliber.  There is some mediastinal lymphadenopathy seen.  Reference lymph node is measured in the precarinal region on image number 39 series 2 at 1.5 x 0.9 cm.  This is similar to the prior examination.  Scattered punctate areas of hypoattenuation are seen throughout the liver.  Similar changes were seen previously.  The lesions are too small to characterize but may represent cysts.  The largest lesion is seen in segment 4.  It does not enhance and measures 2 cm x 1.5 cm.  Findings may represent a cyst.  No adrenal nodules are seen.      Pathology:  02/10/2023:  Right upper lobe lung needle biopsy: Scattered highly atypical squamous cells, suspicious for squamous cell carcinoma in a background of abundant acute inflammation and necrosis  Right upper lobe, brushing:  Positive for malignant cells.  Squamous cell carcinoma, moderately differentiated.  Right upper lobe biopsy squamous cell carcinoma, moderately differentiated with focal keratinization.  Right lung washings positive for malignant cells.  Squamous cell carcinoma in a background of abundant necrosis.    CLINICAL HISTORY:       Patient: Deondre Ocampo IV is a 68 y.o. male.  Kindly referred for newly diagnosed squamous cell carcinoma of the lung.    He reports that he was being evaluated for a right side pain went imaging studies showed a mass in the lung.  I do not have the CTs.  But he eventually underwent bronchoscopy that showed squamous cell carcinoma.  PET-CT showed the right suprahilar mass with possible mediastinal lymphadenopathy and questionable liver lesions.  He is here to discuss further recommendations.    He is here with his wife.  Patient has history of 53 pack year smoking.  He reports that his breathing is fine.  He denies any chest pain, short of breath or coughing.  No hemoptysis.  He has an appointment to see the radiation oncologist next  week.    Chief Complaint: OTHER (No concerns today.)      Interval History:  Patient presents today for TD/lab prior to C5 due on Monday 7/3/23,  he is with his wife. He began chemo with XRT on 5/31/23. He is tolerating well so far. He was due for C5 on 6/28/23, but it was delayed due to him experiencing diarrhea. He took Imodium AD and it resolved. He reports having indigestion followed by sharp, stabbing pain to chest when swallowing. He is taking Protonix, Magic mouthwash and Percocet to help. He is due to complete XRT in about 2 weeks.   WBC today 1.93, but ANC is 1.39. CT head finally approved by insurance after appeal completed.   He is requesting labs ordered by PCP be drawn here via PICC line.    Past Medical History:   Diagnosis Date    Diabetes mellitus     High cholesterol     Hypertension     Lung cancer       Past Surgical History:   Procedure Laterality Date    BACK SURGERY      DEBRIDEMENT TENNIS ELBOW Right     PERIPHERALLY INSERTED CENTRAL CATHETER INSERTION Left 5/29/2023    Procedure: Insertion-picc;  Surgeon: Letha Silver MD;  Location: [facility];  Service: Oncology;  Laterality: Left;    right arm       Family History   Problem Relation Age of Onset    Coronary artery disease Mother     No Known Problems Father     Coronary artery disease Brother     Cancer Brother     Diabetes Brother     Heart failure Brother     Diabetes Brother     Leukemia Brother     Leukemia Daughter      Social Connections: Not on file       Review of patient's allergies indicates:   Allergen Reactions    Ace inhibitors Swelling    Penicillins Itching and Rash      Current Outpatient Medications on File Prior to Visit   Medication Sig Dispense Refill    aspirin (ECOTRIN) 81 MG EC tablet Take 81 mg by mouth once daily.      dicyclomine (BENTYL) 20 mg tablet Take 20 mg by mouth once daily. Once daily in the AM.      glimepiride (AMARYL) 2 MG tablet Take 2 mg by mouth before breakfast.      losartan (COZAAR) 100 MG  tablet Take 100 mg by mouth once daily.      metoprolol tartrate (LOPRESSOR) 50 MG tablet Take 50 mg by mouth 2 (two) times daily.      oxyCODONE-acetaminophen (PERCOCET)  mg per tablet Take 1 tablet by mouth every 4 (four) hours as needed. for pain.      pantoprazole (PROTONIX) 40 MG tablet Take 40 mg by mouth.      pravastatin (PRAVACHOL) 20 MG tablet Take 20 mg by mouth once daily.      TRULICITY 1.5 mg/0.5 mL pen injector SMARTSI pre-filled pen syringe SUB-Q Once a Week      water Liqd 150 mL with MILK OF MAGNESIA 400 mg/5 mL Susp 400 mg, diphenhydrAMINE 12.5 mg/5 mL Elix 60 mg, nystatin 100,000 unit/mL Susp 500,000 Units Take 5 mLs by mouth.       No current facility-administered medications on file prior to visit.      Review of Systems   Constitutional:  Negative for activity change, appetite change, chills, diaphoresis, fatigue, fever and unexpected weight change.   HENT:  Negative for nasal congestion, mouth sores, nosebleeds, sinus pressure/congestion, sore throat and trouble swallowing.    Eyes: Negative.    Respiratory:  Negative for cough and shortness of breath.    Cardiovascular:  Negative for chest pain and palpitations.   Gastrointestinal:  Negative for abdominal distention, abdominal pain, blood in stool, change in bowel habit, constipation, diarrhea, nausea, vomiting and change in bowel habit.   Endocrine: Negative.    Genitourinary:  Negative for bladder incontinence, decreased urine volume, difficulty urinating, dysuria, frequency, hematuria and urgency.   Musculoskeletal:  Positive for back pain and neck pain. Negative for arthralgias, gait problem, joint swelling, leg pain and myalgias.   Integumentary:  Negative for rash.   Allergic/Immunologic: Negative.    Neurological:  Negative for dizziness, tremors, syncope, weakness, light-headedness, numbness, headaches and memory loss.   Hematological:  Negative for adenopathy. Does not bruise/bleed easily.   Psychiatric/Behavioral:   "Negative for agitation, confusion, hallucinations, sleep disturbance and suicidal ideas. The patient is not nervous/anxious.             Vitals:    06/30/23 1055   BP: 120/80   BP Location: Right arm   Patient Position: Sitting   Pulse: 72   Temp: 98.3 °F (36.8 °C)   TempSrc: Oral   SpO2: 99%   Weight: 96.7 kg (213 lb 1.6 oz)   Height: 6' 1" (1.854 m)         Wt Readings from Last 6 Encounters:   06/30/23 96.7 kg (213 lb 1.6 oz)   06/12/23 96.8 kg (213 lb 8 oz)   06/05/23 96.5 kg (212 lb 12.8 oz)   05/24/23 98 kg (216 lb)   04/19/23 98.4 kg (217 lb)   03/27/23 98.7 kg (217 lb 9.6 oz)          Body mass index is 28.12 kg/m².  Body surface area is 2.23 meters squared.  Physical Exam  Vitals and nursing note reviewed.   Constitutional:       General: He is not in acute distress.     Appearance: Normal appearance.   HENT:      Head: Normocephalic and atraumatic.      Mouth/Throat:      Mouth: Mucous membranes are moist.   Eyes:      General: No scleral icterus.     Extraocular Movements: Extraocular movements intact.      Conjunctiva/sclera: Conjunctivae normal.   Neck:      Vascular: No JVD.   Cardiovascular:      Rate and Rhythm: Normal rate and regular rhythm.      Heart sounds: No murmur heard.  Pulmonary:      Effort: Pulmonary effort is normal.      Breath sounds: Decreased breath sounds present. No wheezing or rhonchi.   Abdominal:      General: Bowel sounds are normal. There is no distension.      Palpations: Abdomen is soft.      Tenderness: There is no abdominal tenderness.   Musculoskeletal:         General: No swelling or deformity.      Cervical back: Neck supple.   Lymphadenopathy:      Head:      Right side of head: No submandibular adenopathy.      Left side of head: No submandibular adenopathy.      Cervical: No cervical adenopathy.      Upper Body:      Right upper body: No supraclavicular or axillary adenopathy.      Left upper body: No supraclavicular or axillary adenopathy.      Lower Body: No right " inguinal adenopathy. No left inguinal adenopathy.   Skin:     General: Skin is warm.      Coloration: Skin is not jaundiced.      Findings: No rash.   Neurological:      General: No focal deficit present.      Mental Status: He is alert and oriented to person, place, and time.      Cranial Nerves: Cranial nerves 2-12 are intact.   Psychiatric:         Attention and Perception: Attention normal.         Behavior: Behavior is cooperative.     ECOG SCORE    0 - Fully active-able to carry on all pre-disease performance without restriction         Laboratory:  CBC with Differential:  Lab Results   Component Value Date    WBC 1.93 (LL) 06/30/2023    RBC 3.26 (L) 06/30/2023    HGB 9.9 (L) 06/30/2023    HCT 30.9 (L) 06/30/2023    MCV 94.8 (H) 06/30/2023    MCH 30.4 06/30/2023    MCHC 32.0 (L) 06/30/2023    RDW 14.8 06/30/2023     (L) 06/30/2023    MPV 9.2 06/30/2023        CMP:  Sodium Level   Date Value Ref Range Status   06/30/2023 134 (L) 136 - 145 mmol/L Final     Potassium Level   Date Value Ref Range Status   06/30/2023 3.5 3.5 - 5.1 mmol/L Final     Carbon Dioxide   Date Value Ref Range Status   06/30/2023 27 23 - 31 mmol/L Final     Blood Urea Nitrogen   Date Value Ref Range Status   06/30/2023 7.5 (L) 8.4 - 25.7 mg/dL Final     Creatinine   Date Value Ref Range Status   06/30/2023 0.80 0.73 - 1.18 mg/dL Final     Calcium Level Total   Date Value Ref Range Status   06/30/2023 8.6 (L) 8.8 - 10.0 mg/dL Final     Albumin Level   Date Value Ref Range Status   06/30/2023 3.1 (L) 3.4 - 4.8 g/dL Final     Bilirubin Total   Date Value Ref Range Status   06/30/2023 0.7 <=1.5 mg/dL Final     Alkaline Phosphatase   Date Value Ref Range Status   06/30/2023 67 40 - 150 unit/L Final     Aspartate Aminotransferase   Date Value Ref Range Status   06/30/2023 7 5 - 34 unit/L Final     Alanine Aminotransferase   Date Value Ref Range Status   06/30/2023 7 0 - 55 unit/L Final         Assessment:       1) Squamous cell carcinoma  of right lung  -Large hilar mass that extends to the RUL    2) Liver hypodensities--> suggesting cysts        Plan:       Discussed with the patient and his wife, daughter on the phone; diagnosis, prognosis and treatment recommendations.  At this time staging unclear but seems that he has advanced disease. He has a large Mass that extends to the RUL, possible mediastinal lymphadenopathy and questionable liver hypodensities.  Discussed with the patient chemotherapy, chemoradiation risks versus benefits as well as toxicities associated with it.  Patient is willing to proceed with treatment as recommended.  He was seen by radiation oncologist.  Discussed case with Dr. Rushing.  See him either this week or early next week for initiation of radiation therapy.  He was seen by Dr Chand, surgeon, he is a candidate for pneumonectomy.   Patient will be treated with curative intent.  Will chemoradiation followed by maintenance immunotherapy versus chemoradiation followed by surgical re-evaluation.     Plan: Carbo/Taxol weekly with RT--> surgical re evaluation    Continue XRT - began 5/31/23, due to complete in about 2 weeks  Okay to proceed with C5 on 7/3/23 - pending lab results. If ANC < 1.2, will HOLD chemo  Weekly labs - okay to draw from PICC line. He had reconstructive surgery to right arm. Last will be on 8/2/2023  Weekly PICC line care  CT head for staging - appeal approved by insurance, will have staff schedule @ [facility] [place]   RTC in 1 week for lab and chemo only on Wednesday 7/12/23 - this will be his LAST cycle  RTC in on 7/17/23  with MD, same day labs - pt requests mid morning or afternoon appts  Smoking cessation counseling given  Continue Imodium AD   Continue Miralax and/or stool softener to help with constipation, especially since taking Oxycodone  Instructed to notify me if cough worsens or if sputum changes color  Continue Magic mouthwash and Protonix    The patient was seen, interviewed and examined. Pertinent lab  and radiology studies were reviewed.   The patient was given ample opportunity to ask questions, and to the best of my abilities, all questions answered to satisfaction; patient demonstrated understanding of what we discussed and agreeable to the plan. Pt instructed to call should develop concerning signs/symptoms or have further questions.     Letha Silver MD  Hematology/Oncology      Professional Services   I, Jenelle Waters LPN, acted solely as a scribe for and in the presence of Dr. Letha Silver, who performed these services.

## 2023-07-03 ENCOUNTER — INFUSION (OUTPATIENT)
Dept: INFUSION THERAPY | Facility: HOSPITAL | Age: 68
End: 2023-07-03
Attending: INTERNAL MEDICINE
Payer: MEDICARE

## 2023-07-03 VITALS
RESPIRATION RATE: 16 BRPM | TEMPERATURE: 98 F | SYSTOLIC BLOOD PRESSURE: 131 MMHG | DIASTOLIC BLOOD PRESSURE: 89 MMHG | HEART RATE: 70 BPM | OXYGEN SATURATION: 100 %

## 2023-07-03 DIAGNOSIS — C34.91 SQUAMOUS CELL CARCINOMA OF RIGHT LUNG: Primary | ICD-10-CM

## 2023-07-03 PROCEDURE — 96367 TX/PROPH/DG ADDL SEQ IV INF: CPT

## 2023-07-03 PROCEDURE — 96375 TX/PRO/DX INJ NEW DRUG ADDON: CPT

## 2023-07-03 PROCEDURE — 63600175 PHARM REV CODE 636 W HCPCS: Performed by: INTERNAL MEDICINE

## 2023-07-03 PROCEDURE — 96417 CHEMO IV INFUS EACH ADDL SEQ: CPT

## 2023-07-03 PROCEDURE — 25000003 PHARM REV CODE 250: Performed by: INTERNAL MEDICINE

## 2023-07-03 PROCEDURE — 96413 CHEMO IV INFUSION 1 HR: CPT

## 2023-07-03 RX ORDER — FAMOTIDINE 10 MG/ML
20 INJECTION INTRAVENOUS
Status: COMPLETED | OUTPATIENT
Start: 2023-07-03 | End: 2023-07-03

## 2023-07-03 RX ORDER — DIPHENHYDRAMINE HYDROCHLORIDE 50 MG/ML
50 INJECTION INTRAMUSCULAR; INTRAVENOUS
Status: COMPLETED | OUTPATIENT
Start: 2023-07-03 | End: 2023-07-03

## 2023-07-03 RX ORDER — HEPARIN 100 UNIT/ML
500 SYRINGE INTRAVENOUS
Status: DISCONTINUED | OUTPATIENT
Start: 2023-07-03 | End: 2023-07-03 | Stop reason: HOSPADM

## 2023-07-03 RX ORDER — DIPHENHYDRAMINE HYDROCHLORIDE 50 MG/ML
50 INJECTION INTRAMUSCULAR; INTRAVENOUS ONCE AS NEEDED
Status: DISCONTINUED | OUTPATIENT
Start: 2023-07-03 | End: 2023-07-03 | Stop reason: HOSPADM

## 2023-07-03 RX ORDER — SODIUM CHLORIDE 0.9 % (FLUSH) 0.9 %
10 SYRINGE (ML) INJECTION
Status: DISCONTINUED | OUTPATIENT
Start: 2023-07-03 | End: 2023-07-03 | Stop reason: HOSPADM

## 2023-07-03 RX ORDER — EPINEPHRINE 0.3 MG/.3ML
0.3 INJECTION SUBCUTANEOUS ONCE AS NEEDED
Status: DISCONTINUED | OUTPATIENT
Start: 2023-07-03 | End: 2023-07-03 | Stop reason: HOSPADM

## 2023-07-03 RX ADMIN — PACLITAXEL 102 MG: 6 INJECTION, SOLUTION INTRAVENOUS at 11:07

## 2023-07-03 RX ADMIN — DIPHENHYDRAMINE HYDROCHLORIDE 50 MG: 50 INJECTION INTRAMUSCULAR; INTRAVENOUS at 11:07

## 2023-07-03 RX ADMIN — SODIUM CHLORIDE: 9 INJECTION, SOLUTION INTRAVENOUS at 11:07

## 2023-07-03 RX ADMIN — PALONOSETRON 0.25 MG: 0.25 INJECTION, SOLUTION INTRAVENOUS at 11:07

## 2023-07-03 RX ADMIN — FAMOTIDINE 20 MG: 10 INJECTION, SOLUTION INTRAVENOUS at 11:07

## 2023-07-03 RX ADMIN — CARBOPLATIN 280 MG: 10 INJECTION, SOLUTION INTRAVENOUS at 01:07

## 2023-07-07 ENCOUNTER — PATIENT MESSAGE (OUTPATIENT)
Dept: HEMATOLOGY/ONCOLOGY | Facility: CLINIC | Age: 68
End: 2023-07-07
Payer: MEDICARE

## 2023-07-07 ENCOUNTER — TELEPHONE (OUTPATIENT)
Dept: HEMATOLOGY/ONCOLOGY | Facility: CLINIC | Age: 68
End: 2023-07-07
Payer: MEDICARE

## 2023-07-07 DIAGNOSIS — C34.91 SQUAMOUS CELL CARCINOMA OF RIGHT LUNG: Primary | ICD-10-CM

## 2023-07-07 NOTE — TELEPHONE ENCOUNTER
Candy with Dr. Rushing's office states that she faxed over request for patient to have IV hydration 2ce weekly starting next week. He will be finished with XRT next Monday @ 9:00.

## 2023-07-10 DIAGNOSIS — C34.11 MALIGNANT NEOPLASM OF UPPER LOBE OF RIGHT LUNG: ICD-10-CM

## 2023-07-10 RX ORDER — SODIUM CHLORIDE 0.9 % (FLUSH) 0.9 %
10 SYRINGE (ML) INJECTION
Status: CANCELLED | OUTPATIENT
Start: 2023-07-13

## 2023-07-10 RX ORDER — HEPARIN 100 UNIT/ML
500 SYRINGE INTRAVENOUS
Status: CANCELLED | OUTPATIENT
Start: 2023-07-10

## 2023-07-10 RX ORDER — HEPARIN 100 UNIT/ML
500 SYRINGE INTRAVENOUS
Status: CANCELLED | OUTPATIENT
Start: 2023-07-13

## 2023-07-10 RX ORDER — SODIUM CHLORIDE 0.9 % (FLUSH) 0.9 %
10 SYRINGE (ML) INJECTION
Status: CANCELLED | OUTPATIENT
Start: 2023-07-10

## 2023-07-12 ENCOUNTER — INFUSION (OUTPATIENT)
Dept: INFUSION THERAPY | Facility: HOSPITAL | Age: 68
End: 2023-07-12
Attending: INTERNAL MEDICINE
Payer: MEDICARE

## 2023-07-12 VITALS
TEMPERATURE: 99 F | OXYGEN SATURATION: 100 % | SYSTOLIC BLOOD PRESSURE: 105 MMHG | DIASTOLIC BLOOD PRESSURE: 67 MMHG | HEART RATE: 73 BPM | RESPIRATION RATE: 16 BRPM

## 2023-07-12 DIAGNOSIS — C34.11 MALIGNANT NEOPLASM OF UPPER LOBE OF RIGHT LUNG: ICD-10-CM

## 2023-07-12 DIAGNOSIS — M54.2 NECK PAIN: ICD-10-CM

## 2023-07-12 DIAGNOSIS — C34.91 SQUAMOUS CELL CARCINOMA OF RIGHT LUNG: ICD-10-CM

## 2023-07-12 DIAGNOSIS — C34.01 LUNG CANCER, MAIN BRONCHUS, RIGHT: Primary | ICD-10-CM

## 2023-07-12 LAB
ALBUMIN SERPL-MCNC: 3.1 G/DL (ref 3.4–4.8)
ALBUMIN/GLOB SERPL: 1 RATIO (ref 1.1–2)
ALP SERPL-CCNC: 69 UNIT/L (ref 40–150)
ALT SERPL-CCNC: <5 UNIT/L (ref 0–55)
AST SERPL-CCNC: 5 UNIT/L (ref 5–34)
BASOPHILS # BLD AUTO: 0.01 X10(3)/MCL
BASOPHILS NFR BLD AUTO: 0.5 %
BILIRUBIN DIRECT+TOT PNL SERPL-MCNC: <0.5 MG/DL
BUN SERPL-MCNC: 9.5 MG/DL (ref 8.4–25.7)
CALCIUM SERPL-MCNC: 8.7 MG/DL (ref 8.8–10)
CHLORIDE SERPL-SCNC: 102 MMOL/L (ref 98–107)
CO2 SERPL-SCNC: 26 MMOL/L (ref 23–31)
CREAT SERPL-MCNC: 0.8 MG/DL (ref 0.73–1.18)
EOSINOPHIL # BLD AUTO: 0.01 X10(3)/MCL (ref 0–0.9)
EOSINOPHIL NFR BLD AUTO: 0.5 %
ERYTHROCYTE [DISTWIDTH] IN BLOOD BY AUTOMATED COUNT: 16.5 % (ref 11.5–17)
GFR SERPLBLD CREATININE-BSD FMLA CKD-EPI: >60 MLS/MIN/1.73/M2
GLOBULIN SER-MCNC: 3.1 GM/DL (ref 2.4–3.5)
GLUCOSE SERPL-MCNC: 92 MG/DL (ref 82–115)
HCT VFR BLD AUTO: 29.1 % (ref 42–52)
HGB BLD-MCNC: 9.5 G/DL (ref 14–18)
IMM GRANULOCYTES # BLD AUTO: 0.01 X10(3)/MCL (ref 0–0.04)
IMM GRANULOCYTES NFR BLD AUTO: 0.5 %
LYMPHOCYTES # BLD AUTO: 0.17 X10(3)/MCL (ref 0.6–4.6)
LYMPHOCYTES NFR BLD AUTO: 7.7 %
MCH RBC QN AUTO: 30.9 PG (ref 27–31)
MCHC RBC AUTO-ENTMCNC: 32.6 G/DL (ref 33–36)
MCV RBC AUTO: 94.8 FL (ref 80–94)
MONOCYTES # BLD AUTO: 0.3 X10(3)/MCL (ref 0.1–1.3)
MONOCYTES NFR BLD AUTO: 13.6 %
NEUTROPHILS # BLD AUTO: 1.7 X10(3)/MCL (ref 2.1–9.2)
NEUTROPHILS NFR BLD AUTO: 77.2 %
PLATELET # BLD AUTO: 109 X10(3)/MCL (ref 130–400)
PMV BLD AUTO: 9.5 FL (ref 7.4–10.4)
POTASSIUM SERPL-SCNC: 3.6 MMOL/L (ref 3.5–5.1)
PROT SERPL-MCNC: 6.2 GM/DL (ref 5.8–7.6)
RBC # BLD AUTO: 3.07 X10(6)/MCL (ref 4.7–6.1)
SODIUM SERPL-SCNC: 139 MMOL/L (ref 136–145)
WBC # SPEC AUTO: 2.2 X10(3)/MCL (ref 4.5–11.5)

## 2023-07-12 PROCEDURE — 96413 CHEMO IV INFUSION 1 HR: CPT

## 2023-07-12 PROCEDURE — 96375 TX/PRO/DX INJ NEW DRUG ADDON: CPT

## 2023-07-12 PROCEDURE — 36415 COLL VENOUS BLD VENIPUNCTURE: CPT

## 2023-07-12 PROCEDURE — 63600175 PHARM REV CODE 636 W HCPCS: Performed by: INTERNAL MEDICINE

## 2023-07-12 PROCEDURE — 85025 COMPLETE CBC W/AUTO DIFF WBC: CPT

## 2023-07-12 PROCEDURE — 80053 COMPREHEN METABOLIC PANEL: CPT

## 2023-07-12 PROCEDURE — 96417 CHEMO IV INFUS EACH ADDL SEQ: CPT

## 2023-07-12 PROCEDURE — 25000003 PHARM REV CODE 250: Performed by: INTERNAL MEDICINE

## 2023-07-12 PROCEDURE — 25000003 PHARM REV CODE 250: Performed by: RADIOLOGY

## 2023-07-12 PROCEDURE — 96367 TX/PROPH/DG ADDL SEQ IV INF: CPT

## 2023-07-12 RX ORDER — DIPHENHYDRAMINE HYDROCHLORIDE 50 MG/ML
50 INJECTION INTRAMUSCULAR; INTRAVENOUS ONCE AS NEEDED
Status: CANCELLED | OUTPATIENT
Start: 2023-07-12

## 2023-07-12 RX ORDER — SODIUM CHLORIDE 0.9 % (FLUSH) 0.9 %
10 SYRINGE (ML) INJECTION
Status: CANCELLED | OUTPATIENT
Start: 2023-07-12

## 2023-07-12 RX ORDER — EPINEPHRINE 0.3 MG/.3ML
0.3 INJECTION SUBCUTANEOUS ONCE AS NEEDED
Status: DISCONTINUED | OUTPATIENT
Start: 2023-07-12 | End: 2023-07-12 | Stop reason: HOSPADM

## 2023-07-12 RX ORDER — HEPARIN 100 UNIT/ML
500 SYRINGE INTRAVENOUS
Status: DISCONTINUED | OUTPATIENT
Start: 2023-07-12 | End: 2023-07-12 | Stop reason: HOSPADM

## 2023-07-12 RX ORDER — DIPHENHYDRAMINE HYDROCHLORIDE 50 MG/ML
50 INJECTION INTRAMUSCULAR; INTRAVENOUS
Status: CANCELLED
Start: 2023-07-12

## 2023-07-12 RX ORDER — HEPARIN 100 UNIT/ML
500 SYRINGE INTRAVENOUS
Status: CANCELLED | OUTPATIENT
Start: 2023-07-12

## 2023-07-12 RX ORDER — FAMOTIDINE 10 MG/ML
20 INJECTION INTRAVENOUS
Status: COMPLETED | OUTPATIENT
Start: 2023-07-12 | End: 2023-07-12

## 2023-07-12 RX ORDER — SODIUM CHLORIDE 0.9 % (FLUSH) 0.9 %
10 SYRINGE (ML) INJECTION
Status: DISCONTINUED | OUTPATIENT
Start: 2023-07-12 | End: 2023-07-12 | Stop reason: HOSPADM

## 2023-07-12 RX ORDER — DIPHENHYDRAMINE HYDROCHLORIDE 50 MG/ML
50 INJECTION INTRAMUSCULAR; INTRAVENOUS ONCE AS NEEDED
Status: DISCONTINUED | OUTPATIENT
Start: 2023-07-12 | End: 2023-07-12 | Stop reason: HOSPADM

## 2023-07-12 RX ORDER — EPINEPHRINE 0.3 MG/.3ML
0.3 INJECTION SUBCUTANEOUS ONCE AS NEEDED
Status: CANCELLED | OUTPATIENT
Start: 2023-07-12

## 2023-07-12 RX ORDER — DIPHENHYDRAMINE HYDROCHLORIDE 50 MG/ML
50 INJECTION INTRAMUSCULAR; INTRAVENOUS
Status: COMPLETED | OUTPATIENT
Start: 2023-07-12 | End: 2023-07-12

## 2023-07-12 RX ORDER — FAMOTIDINE 10 MG/ML
20 INJECTION INTRAVENOUS
Status: CANCELLED | OUTPATIENT
Start: 2023-07-12

## 2023-07-12 RX ADMIN — PALONOSETRON 0.25 MG: 0.25 INJECTION, SOLUTION INTRAVENOUS at 01:07

## 2023-07-12 RX ADMIN — FAMOTIDINE 20 MG: 10 INJECTION, SOLUTION INTRAVENOUS at 01:07

## 2023-07-12 RX ADMIN — PACLITAXEL 102 MG: 6 INJECTION, SOLUTION INTRAVENOUS at 02:07

## 2023-07-12 RX ADMIN — SODIUM CHLORIDE 1000 ML: 9 INJECTION, SOLUTION INTRAVENOUS at 11:07

## 2023-07-12 RX ADMIN — HEPARIN 500 UNITS: 100 SYRINGE at 04:07

## 2023-07-12 RX ADMIN — CARBOPLATIN 280 MG: 10 INJECTION, SOLUTION INTRAVENOUS at 03:07

## 2023-07-12 RX ADMIN — DIPHENHYDRAMINE HYDROCHLORIDE 50 MG: 50 INJECTION INTRAMUSCULAR; INTRAVENOUS at 02:07

## 2023-07-13 ENCOUNTER — PATIENT MESSAGE (OUTPATIENT)
Dept: HEMATOLOGY/ONCOLOGY | Facility: CLINIC | Age: 68
End: 2023-07-13
Payer: MEDICARE

## 2023-07-14 ENCOUNTER — INFUSION (OUTPATIENT)
Dept: INFUSION THERAPY | Facility: HOSPITAL | Age: 68
End: 2023-07-14
Attending: INTERNAL MEDICINE
Payer: MEDICARE

## 2023-07-14 VITALS
HEART RATE: 86 BPM | TEMPERATURE: 98 F | RESPIRATION RATE: 16 BRPM | DIASTOLIC BLOOD PRESSURE: 80 MMHG | OXYGEN SATURATION: 98 % | SYSTOLIC BLOOD PRESSURE: 123 MMHG

## 2023-07-14 DIAGNOSIS — C34.11 MALIGNANT NEOPLASM OF UPPER LOBE OF RIGHT LUNG: Primary | ICD-10-CM

## 2023-07-14 PROCEDURE — 25000003 PHARM REV CODE 250: Performed by: RADIOLOGY

## 2023-07-14 PROCEDURE — 96360 HYDRATION IV INFUSION INIT: CPT

## 2023-07-14 PROCEDURE — A4216 STERILE WATER/SALINE, 10 ML: HCPCS | Performed by: RADIOLOGY

## 2023-07-14 PROCEDURE — 63600175 PHARM REV CODE 636 W HCPCS: Performed by: RADIOLOGY

## 2023-07-14 RX ORDER — SODIUM CHLORIDE 0.9 % (FLUSH) 0.9 %
10 SYRINGE (ML) INJECTION
Status: DISCONTINUED | OUTPATIENT
Start: 2023-07-14 | End: 2023-07-14 | Stop reason: HOSPADM

## 2023-07-14 RX ORDER — HEPARIN 100 UNIT/ML
500 SYRINGE INTRAVENOUS
Status: DISCONTINUED | OUTPATIENT
Start: 2023-07-14 | End: 2023-07-14 | Stop reason: HOSPADM

## 2023-07-14 RX ADMIN — SODIUM CHLORIDE 1000 ML: 9 INJECTION, SOLUTION INTRAVENOUS at 09:07

## 2023-07-14 RX ADMIN — Medication 10 ML: at 10:07

## 2023-07-14 RX ADMIN — HEPARIN 500 UNITS: 100 SYRINGE at 10:07

## 2023-07-24 ENCOUNTER — PATIENT MESSAGE (OUTPATIENT)
Dept: RESEARCH | Facility: HOSPITAL | Age: 68
End: 2023-07-24
Payer: MEDICARE

## 2023-07-26 ENCOUNTER — OFFICE VISIT (OUTPATIENT)
Dept: HEMATOLOGY/ONCOLOGY | Facility: CLINIC | Age: 68
End: 2023-07-26
Payer: MEDICARE

## 2023-07-26 ENCOUNTER — INFUSION (OUTPATIENT)
Dept: INFUSION THERAPY | Facility: HOSPITAL | Age: 68
End: 2023-07-26
Attending: INTERNAL MEDICINE
Payer: MEDICARE

## 2023-07-26 VITALS
DIASTOLIC BLOOD PRESSURE: 85 MMHG | HEART RATE: 84 BPM | BODY MASS INDEX: 27.81 KG/M2 | WEIGHT: 209.81 LBS | SYSTOLIC BLOOD PRESSURE: 135 MMHG | OXYGEN SATURATION: 95 % | HEIGHT: 73 IN | TEMPERATURE: 97 F

## 2023-07-26 DIAGNOSIS — C34.91 SQUAMOUS CELL CARCINOMA OF RIGHT LUNG: Primary | ICD-10-CM

## 2023-07-26 DIAGNOSIS — M54.2 NECK PAIN: ICD-10-CM

## 2023-07-26 DIAGNOSIS — C34.01 LUNG CANCER, MAIN BRONCHUS, RIGHT: ICD-10-CM

## 2023-07-26 LAB
ALBUMIN SERPL-MCNC: 3.2 G/DL (ref 3.4–4.8)
ALBUMIN/GLOB SERPL: 1 RATIO (ref 1.1–2)
ALP SERPL-CCNC: 69 UNIT/L (ref 40–150)
ALT SERPL-CCNC: 5 UNIT/L (ref 0–55)
AST SERPL-CCNC: 6 UNIT/L (ref 5–34)
BASOPHILS # BLD AUTO: 0 X10(3)/MCL
BASOPHILS NFR BLD AUTO: 0 %
BILIRUBIN DIRECT+TOT PNL SERPL-MCNC: 0.6 MG/DL
BUN SERPL-MCNC: 5.6 MG/DL (ref 8.4–25.7)
CALCIUM SERPL-MCNC: 8.6 MG/DL (ref 8.8–10)
CHLORIDE SERPL-SCNC: 104 MMOL/L (ref 98–107)
CO2 SERPL-SCNC: 27 MMOL/L (ref 23–31)
CREAT SERPL-MCNC: 0.75 MG/DL (ref 0.73–1.18)
EOSINOPHIL # BLD AUTO: 0.01 X10(3)/MCL (ref 0–0.9)
EOSINOPHIL NFR BLD AUTO: 0.3 %
ERYTHROCYTE [DISTWIDTH] IN BLOOD BY AUTOMATED COUNT: 18.4 % (ref 11.5–17)
GFR SERPLBLD CREATININE-BSD FMLA CKD-EPI: >60 MLS/MIN/1.73/M2
GLOBULIN SER-MCNC: 3.2 GM/DL (ref 2.4–3.5)
GLUCOSE SERPL-MCNC: 66 MG/DL (ref 82–115)
HCT VFR BLD AUTO: 29.2 % (ref 42–52)
HGB BLD-MCNC: 9.4 G/DL (ref 14–18)
IMM GRANULOCYTES # BLD AUTO: 0.01 X10(3)/MCL (ref 0–0.04)
IMM GRANULOCYTES NFR BLD AUTO: 0.3 %
LYMPHOCYTES # BLD AUTO: 0.32 X10(3)/MCL (ref 0.6–4.6)
LYMPHOCYTES NFR BLD AUTO: 9.2 %
MCH RBC QN AUTO: 31.3 PG (ref 27–31)
MCHC RBC AUTO-ENTMCNC: 32.2 G/DL (ref 33–36)
MCV RBC AUTO: 97.3 FL (ref 80–94)
MONOCYTES # BLD AUTO: 0.68 X10(3)/MCL (ref 0.1–1.3)
MONOCYTES NFR BLD AUTO: 19.7 %
NEUTROPHILS # BLD AUTO: 2.44 X10(3)/MCL (ref 2.1–9.2)
NEUTROPHILS NFR BLD AUTO: 70.5 %
PLATELET # BLD AUTO: 155 X10(3)/MCL (ref 130–400)
PMV BLD AUTO: 9.3 FL (ref 7.4–10.4)
POTASSIUM SERPL-SCNC: 3.6 MMOL/L (ref 3.5–5.1)
PROT SERPL-MCNC: 6.4 GM/DL (ref 5.8–7.6)
RBC # BLD AUTO: 3 X10(6)/MCL (ref 4.7–6.1)
SODIUM SERPL-SCNC: 137 MMOL/L (ref 136–145)
WBC # SPEC AUTO: 3.46 X10(3)/MCL (ref 4.5–11.5)

## 2023-07-26 PROCEDURE — 1101F PT FALLS ASSESS-DOCD LE1/YR: CPT | Mod: CPTII,S$GLB,, | Performed by: INTERNAL MEDICINE

## 2023-07-26 PROCEDURE — 3008F PR BODY MASS INDEX (BMI) DOCUMENTED: ICD-10-PCS | Mod: CPTII,S$GLB,, | Performed by: INTERNAL MEDICINE

## 2023-07-26 PROCEDURE — 3079F DIAST BP 80-89 MM HG: CPT | Mod: CPTII,S$GLB,, | Performed by: INTERNAL MEDICINE

## 2023-07-26 PROCEDURE — 1101F PR PT FALLS ASSESS DOC 0-1 FALLS W/OUT INJ PAST YR: ICD-10-PCS | Mod: CPTII,S$GLB,, | Performed by: INTERNAL MEDICINE

## 2023-07-26 PROCEDURE — 1159F PR MEDICATION LIST DOCUMENTED IN MEDICAL RECORD: ICD-10-PCS | Mod: CPTII,S$GLB,, | Performed by: INTERNAL MEDICINE

## 2023-07-26 PROCEDURE — 99999 PR PBB SHADOW E&M-EST. PATIENT-LVL IV: CPT | Mod: PBBFAC,,, | Performed by: INTERNAL MEDICINE

## 2023-07-26 PROCEDURE — 85025 COMPLETE CBC W/AUTO DIFF WBC: CPT

## 2023-07-26 PROCEDURE — 4010F ACE/ARB THERAPY RXD/TAKEN: CPT | Mod: CPTII,S$GLB,, | Performed by: INTERNAL MEDICINE

## 2023-07-26 PROCEDURE — 1126F PR PAIN SEVERITY QUANTIFIED, NO PAIN PRESENT: ICD-10-PCS | Mod: CPTII,S$GLB,, | Performed by: INTERNAL MEDICINE

## 2023-07-26 PROCEDURE — 3008F BODY MASS INDEX DOCD: CPT | Mod: CPTII,S$GLB,, | Performed by: INTERNAL MEDICINE

## 2023-07-26 PROCEDURE — 1159F MED LIST DOCD IN RCRD: CPT | Mod: CPTII,S$GLB,, | Performed by: INTERNAL MEDICINE

## 2023-07-26 PROCEDURE — 3075F SYST BP GE 130 - 139MM HG: CPT | Mod: CPTII,S$GLB,, | Performed by: INTERNAL MEDICINE

## 2023-07-26 PROCEDURE — 80053 COMPREHEN METABOLIC PANEL: CPT

## 2023-07-26 PROCEDURE — 3288F PR FALLS RISK ASSESSMENT DOCUMENTED: ICD-10-PCS | Mod: CPTII,S$GLB,, | Performed by: INTERNAL MEDICINE

## 2023-07-26 PROCEDURE — 3079F PR MOST RECENT DIASTOLIC BLOOD PRESSURE 80-89 MM HG: ICD-10-PCS | Mod: CPTII,S$GLB,, | Performed by: INTERNAL MEDICINE

## 2023-07-26 PROCEDURE — 1160F PR REVIEW ALL MEDS BY PRESCRIBER/CLIN PHARMACIST DOCUMENTED: ICD-10-PCS | Mod: CPTII,S$GLB,, | Performed by: INTERNAL MEDICINE

## 2023-07-26 PROCEDURE — 3044F HG A1C LEVEL LT 7.0%: CPT | Mod: CPTII,S$GLB,, | Performed by: INTERNAL MEDICINE

## 2023-07-26 PROCEDURE — 99999 PR PBB SHADOW E&M-EST. PATIENT-LVL IV: ICD-10-PCS | Mod: PBBFAC,,, | Performed by: INTERNAL MEDICINE

## 2023-07-26 PROCEDURE — 99215 OFFICE O/P EST HI 40 MIN: CPT | Mod: S$GLB,,, | Performed by: INTERNAL MEDICINE

## 2023-07-26 PROCEDURE — 4010F PR ACE/ARB THEARPY RXD/TAKEN: ICD-10-PCS | Mod: CPTII,S$GLB,, | Performed by: INTERNAL MEDICINE

## 2023-07-26 PROCEDURE — 1160F RVW MEDS BY RX/DR IN RCRD: CPT | Mod: CPTII,S$GLB,, | Performed by: INTERNAL MEDICINE

## 2023-07-26 PROCEDURE — 36415 COLL VENOUS BLD VENIPUNCTURE: CPT

## 2023-07-26 PROCEDURE — 3075F PR MOST RECENT SYSTOLIC BLOOD PRESS GE 130-139MM HG: ICD-10-PCS | Mod: CPTII,S$GLB,, | Performed by: INTERNAL MEDICINE

## 2023-07-26 PROCEDURE — 99215 PR OFFICE/OUTPT VISIT, EST, LEVL V, 40-54 MIN: ICD-10-PCS | Mod: S$GLB,,, | Performed by: INTERNAL MEDICINE

## 2023-07-26 PROCEDURE — 3288F FALL RISK ASSESSMENT DOCD: CPT | Mod: CPTII,S$GLB,, | Performed by: INTERNAL MEDICINE

## 2023-07-26 PROCEDURE — 3044F PR MOST RECENT HEMOGLOBIN A1C LEVEL <7.0%: ICD-10-PCS | Mod: CPTII,S$GLB,, | Performed by: INTERNAL MEDICINE

## 2023-07-26 PROCEDURE — 1126F AMNT PAIN NOTED NONE PRSNT: CPT | Mod: CPTII,S$GLB,, | Performed by: INTERNAL MEDICINE

## 2023-07-26 PROCEDURE — 36591 DRAW BLOOD OFF VENOUS DEVICE: CPT

## 2023-07-26 NOTE — PROGRESS NOTES
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  The mass abuts the right main pulmonary artery and right main stem bronchus.  Subcentimeter mediastinal lymph node noted.  One of LN slightly hypermetabolic with SUV of 3 and is located laterally.  Patchy hypermetabolism within the liver as suspected, however, there are few tiny focal areas of hypermetabolism out of proportion to the rest of the liver.  At least 1 of this corresponds to a low-density focus seen in the noncontrast exam.  CT 5/12/2023: changes consistent with COPD and emphysema in the lungs bilaterally with multiple bulla bleb seen. mass seen in the right hilum extending into the right upper lobe.  It is somewhat infiltrative.  This was seen on the prior PET-CT as well.  The findings are not significantly changed.  There are multiple associated satellite nodules in the right upper lobe.  Rough dimensions of the hilar component of the mass is 5.4 x 5.1 cm and rough measurements of the dominant lesion in the right upper lobe is 11 cm x 3.6 cm.  The mass extends into the right mainstem and right upper lobe bronchus.  This was seen on the prior examination as well.  There is narrowing of the right upper lobe bronchus there is some narrowing of the right upper lobe pulmonary arteries.  Findings are similar to the prior examination.  Scattered areas of punctate subcentimeter nodularity is seen in the right lower lobe.  These are too small to characterize and similar changes  were seen previously.    No pleural effusion is seen.  Thoracic aorta is normal in caliber.  There is some mediastinal lymphadenopathy seen.  Reference lymph node is measured in the precarinal region on image number 39 series 2 at 1.5 x 0.9 cm.  This is similar to the prior examination.  Scattered punctate areas of hypoattenuation are seen throughout the liver.  Similar changes were seen previously.  The lesions are too small to characterize but may represent cysts.  The largest lesion is seen in segment 4.  It does not enhance and measures 2 cm x 1.5 cm.  Findings may represent a cyst.  No adrenal nodules are seen.  CT Head  @ [facility]. Mild chronic white matter changes. Old left caudate lobe lesion. 2. No evidence of metastatic disease.    Pathology:  02/10/2023:  Right upper lobe lung needle biopsy: Scattered highly atypical squamous cells, suspicious for squamous cell carcinoma in a background of abundant acute inflammation and necrosis  Right upper lobe, brushing:  Positive for malignant cells.  Squamous cell carcinoma, moderately differentiated.  Right upper lobe biopsy squamous cell carcinoma, moderately differentiated with focal keratinization.  Right lung washings positive for malignant cells.  Squamous cell carcinoma in a background of abundant necrosis.    CLINICAL HISTORY:       Patient: Deondre Ocampo IV is a 68 y.o. male.  Kindly referred for newly diagnosed squamous cell carcinoma of the lung.    He reports that he was being evaluated for a right side pain went imaging studies showed a mass in the lung.  I do not have the CTs.  But he eventually underwent bronchoscopy that showed squamous cell carcinoma.  PET-CT showed the right suprahilar mass with possible mediastinal lymphadenopathy and questionable liver lesions.  He is here to discuss further recommendations.    He is here with his wife.  Patient has history of 53 pack year smoking.  He reports that his breathing is fine.  He  denies any chest pain, short of breath or coughing.  No hemoptysis.  He has an appointment to see the radiation oncologist next week.    Chief Complaint: OTHER (No concerns today.)      Interval History:  Patient presents today for follow up and to discuss CT scan results, he is with his wife. He completed 6 cycle of weekly Carbo/Taxol on 7/12/23 along with XRT. He continues to cough up clear phlegm and have fatigue. Otherwise, he is feeling good.  He continues to follow with Dr. Hoff.      Past Medical History:   Diagnosis Date    Diabetes mellitus     High cholesterol     Hypertension     Lung cancer       Past Surgical History:   Procedure Laterality Date    BACK SURGERY      DEBRIDEMENT TENNIS ELBOW Right     PERIPHERALLY INSERTED CENTRAL CATHETER INSERTION Left 5/29/2023    Procedure: Insertion-picc;  Surgeon: Letha Silver MD;  Location: [facility];  Service: Oncology;  Laterality: Left;    right arm       Family History   Problem Relation Age of Onset    Coronary artery disease Mother     No Known Problems Father     Coronary artery disease Brother     Cancer Brother     Diabetes Brother     Heart failure Brother     Diabetes Brother     Leukemia Brother     Leukemia Daughter      Social Connections: Not on file       Review of patient's allergies indicates:   Allergen Reactions    Ace inhibitors Swelling    Penicillins Itching and Rash      Current Outpatient Medications on File Prior to Visit   Medication Sig Dispense Refill    aspirin (ECOTRIN) 81 MG EC tablet Take 81 mg by mouth once daily.      dicyclomine (BENTYL) 20 mg tablet Take 20 mg by mouth once daily. Once daily in the AM.      glimepiride (AMARYL) 2 MG tablet Take 2 mg by mouth before breakfast.      losartan (COZAAR) 100 MG tablet Take 100 mg by mouth once daily.      metoprolol tartrate (LOPRESSOR) 50 MG tablet Take 50 mg by mouth 2 (two) times daily.      oxyCODONE-acetaminophen (PERCOCET)  mg per tablet Take 1 tablet by mouth  every 4 (four) hours as needed. for pain.      pantoprazole (PROTONIX) 40 MG tablet Take 40 mg by mouth.      pravastatin (PRAVACHOL) 20 MG tablet Take 20 mg by mouth once daily.      TRULICITY 1.5 mg/0.5 mL pen injector SMARTSI pre-filled pen syringe SUB-Q Once a Week      water Liqd 150 mL with MILK OF MAGNESIA 400 mg/5 mL Susp 400 mg, diphenhydrAMINE 12.5 mg/5 mL Elix 60 mg, nystatin 100,000 unit/mL Susp 500,000 Units Take 5 mLs by mouth.       No current facility-administered medications on file prior to visit.      Review of Systems   Constitutional:  Negative for activity change, appetite change, chills, diaphoresis, fatigue, fever and unexpected weight change.   HENT:  Negative for nasal congestion, mouth sores, nosebleeds, sinus pressure/congestion, sore throat and trouble swallowing.    Eyes: Negative.    Respiratory:  Negative for cough and shortness of breath.    Cardiovascular:  Negative for chest pain and palpitations.   Gastrointestinal:  Negative for abdominal distention, abdominal pain, blood in stool, change in bowel habit, constipation, diarrhea, nausea, vomiting and change in bowel habit.   Endocrine: Negative.    Genitourinary:  Negative for bladder incontinence, decreased urine volume, difficulty urinating, dysuria, frequency, hematuria and urgency.   Musculoskeletal:  Positive for back pain and neck pain. Negative for arthralgias, gait problem, joint swelling, leg pain and myalgias.   Integumentary:  Negative for rash.   Allergic/Immunologic: Negative.    Neurological:  Negative for dizziness, tremors, syncope, weakness, light-headedness, numbness, headaches and memory loss.   Hematological:  Negative for adenopathy. Does not bruise/bleed easily.   Psychiatric/Behavioral:  Negative for agitation, confusion, hallucinations, sleep disturbance and suicidal ideas. The patient is not nervous/anxious.             Vitals:    23 1501   BP: 135/85   BP Location: Right arm   Patient Position:  "Sitting   Pulse: 84   Temp: 97.4 °F (36.3 °C)   TempSrc: Oral   SpO2: 95%   Weight: 95.2 kg (209 lb 12.8 oz)   Height: 6' 1" (1.854 m)           Wt Readings from Last 6 Encounters:   07/26/23 95.2 kg (209 lb 12.8 oz)   06/30/23 96.7 kg (213 lb 1.6 oz)   06/12/23 96.8 kg (213 lb 8 oz)   06/05/23 96.5 kg (212 lb 12.8 oz)   05/24/23 98 kg (216 lb)   04/19/23 98.4 kg (217 lb)          Body mass index is 27.68 kg/m².  Body surface area is 2.21 meters squared.  Physical Exam  Vitals and nursing note reviewed.   Constitutional:       General: He is not in acute distress.     Appearance: Normal appearance.   HENT:      Head: Normocephalic and atraumatic.      Mouth/Throat:      Mouth: Mucous membranes are moist.   Eyes:      General: No scleral icterus.     Extraocular Movements: Extraocular movements intact.      Conjunctiva/sclera: Conjunctivae normal.   Neck:      Vascular: No JVD.   Cardiovascular:      Rate and Rhythm: Normal rate and regular rhythm.      Heart sounds: No murmur heard.  Pulmonary:      Effort: Pulmonary effort is normal.      Breath sounds: Decreased breath sounds present. No wheezing or rhonchi.   Abdominal:      General: Bowel sounds are normal. There is no distension.      Palpations: Abdomen is soft.      Tenderness: There is no abdominal tenderness.   Musculoskeletal:         General: No swelling or deformity.      Cervical back: Neck supple.   Lymphadenopathy:      Head:      Right side of head: No submandibular adenopathy.      Left side of head: No submandibular adenopathy.      Cervical: No cervical adenopathy.      Upper Body:      Right upper body: No supraclavicular or axillary adenopathy.      Left upper body: No supraclavicular or axillary adenopathy.      Lower Body: No right inguinal adenopathy. No left inguinal adenopathy.   Skin:     General: Skin is warm.      Coloration: Skin is not jaundiced.      Findings: No rash.   Neurological:      General: No focal deficit present.      " Mental Status: He is alert and oriented to person, place, and time.      Cranial Nerves: Cranial nerves 2-12 are intact.   Psychiatric:         Attention and Perception: Attention normal.         Behavior: Behavior is cooperative.     ECOG SCORE             Laboratory:  CBC with Differential:  Lab Results   Component Value Date    WBC 3.46 (L) 07/26/2023    RBC 3.00 (L) 07/26/2023    HGB 9.4 (L) 07/26/2023    HCT 29.2 (L) 07/26/2023    MCV 97.3 (H) 07/26/2023    MCH 31.3 (H) 07/26/2023    MCHC 32.2 (L) 07/26/2023    RDW 18.4 (H) 07/26/2023     07/26/2023    MPV 9.3 07/26/2023        CMP:  Sodium Level   Date Value Ref Range Status   07/26/2023 137 136 - 145 mmol/L Final     Potassium Level   Date Value Ref Range Status   07/26/2023 3.6 3.5 - 5.1 mmol/L Final     Carbon Dioxide   Date Value Ref Range Status   07/26/2023 27 23 - 31 mmol/L Final     Blood Urea Nitrogen   Date Value Ref Range Status   07/26/2023 5.6 (L) 8.4 - 25.7 mg/dL Final     Creatinine   Date Value Ref Range Status   07/26/2023 0.75 0.73 - 1.18 mg/dL Final     Calcium Level Total   Date Value Ref Range Status   07/26/2023 8.6 (L) 8.8 - 10.0 mg/dL Final     Albumin Level   Date Value Ref Range Status   07/26/2023 3.2 (L) 3.4 - 4.8 g/dL Final     Bilirubin Total   Date Value Ref Range Status   07/26/2023 0.6 <=1.5 mg/dL Final     Alkaline Phosphatase   Date Value Ref Range Status   07/26/2023 69 40 - 150 unit/L Final     Aspartate Aminotransferase   Date Value Ref Range Status   07/26/2023 6 5 - 34 unit/L Final     Alanine Aminotransferase   Date Value Ref Range Status   07/26/2023 5 0 - 55 unit/L Final         Assessment:       1) Squamous cell carcinoma of right lung  -Large hilar mass that extends to the RUL    2) Liver hypodensities--> suggesting cysts        Plan:       Discussed with the patient and his wife, daughter on the phone; diagnosis, prognosis and treatment recommendations.  At this time staging unclear but seems that he has  advanced disease. He has a large Mass that extends to the RUL, possible mediastinal lymphadenopathy and questionable liver hypodensities.  Discussed with the patient chemotherapy, chemoradiation risks versus benefits as well as toxicities associated with it.  Patient is willing to proceed with treatment as recommended.  He was seen by radiation oncologist.  Discussed case with Dr. Rushing.  See him either this week or early next week for initiation of radiation therapy.  He was seen by Dr Chand, surgeon, he is a candidate for pneumonectomy.   Patient will be treated with curative intent.  Will chemoradiation followed by maintenance immunotherapy versus chemoradiation followed by surgical re-evaluation.     Plan: Carbo/Taxol weekly with RT--> surgical re evaluation    Completed weekly XRT + Carbo/Taxol on 7/12/23   Last weekly lab on 8/2/23, may pull PICC line afterwards  Will refer back to Dr. Chand for surgical evaluation after completion of chem and XRT  RTC in 4 weeks with MD to discuss treatment  options after visit with Dr. Chand, same day labs: CBC, CMP - pt requests mid morning or afternoon appts  Smoking cessation counseling given  Instructed to notify me if cough worsens or if sputum changes color  Continue follow ups with Dr. Hoff    The patient was seen, interviewed and examined. Pertinent lab and radiology studies were reviewed.   The patient was given ample opportunity to ask questions, and to the best of my abilities, all questions answered to satisfaction; patient demonstrated understanding of what we discussed and agreeable to the plan. Pt instructed to call should develop concerning signs/symptoms or have further questions.     Letha Silver MD  Hematology/Oncology      Professional Services   I, Jenelle Waters LPN, acted solely as a scribe for and in the presence of Dr. Letha Silver, who performed these services.

## 2023-07-26 NOTE — PLAN OF CARE
PICC line dressing changed; labs drawn. Pt escorted to waiting area for office visit with Dr. Silver.

## 2023-07-31 ENCOUNTER — PATIENT MESSAGE (OUTPATIENT)
Dept: RESEARCH | Facility: HOSPITAL | Age: 68
End: 2023-07-31
Payer: MEDICARE

## 2023-07-31 ENCOUNTER — PATIENT MESSAGE (OUTPATIENT)
Dept: HEMATOLOGY/ONCOLOGY | Facility: CLINIC | Age: 68
End: 2023-07-31
Payer: MEDICARE

## 2023-08-07 ENCOUNTER — TELEPHONE (OUTPATIENT)
Dept: HEMATOLOGY/ONCOLOGY | Facility: CLINIC | Age: 68
End: 2023-08-07
Payer: MEDICARE

## 2023-08-07 NOTE — TELEPHONE ENCOUNTER
Leticia Addison, CLEM  You Just now (9:49 AM)     OLGA Davis. Let them know that the PICC due to come out anyway at the lab appt on 8/2/23. She wants him to be evaluated by Dr. Herrera and she will see him in f/u on 8/24/23 as scheduled.        ATTEMPTED TO CONTACT PT/WIFE TO DISCUSS ABOVE PER NP, NO ANSWER, LM ON VM ASKING FOR A CALL BACK AT THEIR CONVENIENCE.    APPT WITH DR. OLIVARES SCHEDULED FOR 8/23/23 @ 11AM.

## 2023-08-23 ENCOUNTER — OFFICE VISIT (OUTPATIENT)
Dept: CARDIAC SURGERY | Facility: CLINIC | Age: 68
End: 2023-08-23
Payer: MEDICARE

## 2023-08-23 VITALS
OXYGEN SATURATION: 98 % | BODY MASS INDEX: 26.95 KG/M2 | HEIGHT: 73 IN | HEART RATE: 67 BPM | DIASTOLIC BLOOD PRESSURE: 88 MMHG | SYSTOLIC BLOOD PRESSURE: 145 MMHG | WEIGHT: 203.38 LBS

## 2023-08-23 DIAGNOSIS — C34.91 SQUAMOUS CELL CARCINOMA OF RIGHT LUNG: ICD-10-CM

## 2023-08-23 PROCEDURE — 3008F PR BODY MASS INDEX (BMI) DOCUMENTED: ICD-10-PCS | Mod: CPTII,,, | Performed by: THORACIC SURGERY (CARDIOTHORACIC VASCULAR SURGERY)

## 2023-08-23 PROCEDURE — 1101F PR PT FALLS ASSESS DOC 0-1 FALLS W/OUT INJ PAST YR: ICD-10-PCS | Mod: CPTII,,, | Performed by: THORACIC SURGERY (CARDIOTHORACIC VASCULAR SURGERY)

## 2023-08-23 PROCEDURE — 1159F PR MEDICATION LIST DOCUMENTED IN MEDICAL RECORD: ICD-10-PCS | Mod: CPTII,,, | Performed by: THORACIC SURGERY (CARDIOTHORACIC VASCULAR SURGERY)

## 2023-08-23 PROCEDURE — 99214 PR OFFICE/OUTPT VISIT, EST, LEVL IV, 30-39 MIN: ICD-10-PCS | Mod: ,,, | Performed by: THORACIC SURGERY (CARDIOTHORACIC VASCULAR SURGERY)

## 2023-08-23 PROCEDURE — 3288F PR FALLS RISK ASSESSMENT DOCUMENTED: ICD-10-PCS | Mod: CPTII,,, | Performed by: THORACIC SURGERY (CARDIOTHORACIC VASCULAR SURGERY)

## 2023-08-23 PROCEDURE — 3079F PR MOST RECENT DIASTOLIC BLOOD PRESSURE 80-89 MM HG: ICD-10-PCS | Mod: CPTII,,, | Performed by: THORACIC SURGERY (CARDIOTHORACIC VASCULAR SURGERY)

## 2023-08-23 PROCEDURE — 3079F DIAST BP 80-89 MM HG: CPT | Mod: CPTII,,, | Performed by: THORACIC SURGERY (CARDIOTHORACIC VASCULAR SURGERY)

## 2023-08-23 PROCEDURE — 3044F HG A1C LEVEL LT 7.0%: CPT | Mod: CPTII,,, | Performed by: THORACIC SURGERY (CARDIOTHORACIC VASCULAR SURGERY)

## 2023-08-23 PROCEDURE — 4010F ACE/ARB THERAPY RXD/TAKEN: CPT | Mod: CPTII,,, | Performed by: THORACIC SURGERY (CARDIOTHORACIC VASCULAR SURGERY)

## 2023-08-23 PROCEDURE — 3288F FALL RISK ASSESSMENT DOCD: CPT | Mod: CPTII,,, | Performed by: THORACIC SURGERY (CARDIOTHORACIC VASCULAR SURGERY)

## 2023-08-23 PROCEDURE — 1159F MED LIST DOCD IN RCRD: CPT | Mod: CPTII,,, | Performed by: THORACIC SURGERY (CARDIOTHORACIC VASCULAR SURGERY)

## 2023-08-23 PROCEDURE — 1101F PT FALLS ASSESS-DOCD LE1/YR: CPT | Mod: CPTII,,, | Performed by: THORACIC SURGERY (CARDIOTHORACIC VASCULAR SURGERY)

## 2023-08-23 PROCEDURE — 99214 OFFICE O/P EST MOD 30 MIN: CPT | Mod: ,,, | Performed by: THORACIC SURGERY (CARDIOTHORACIC VASCULAR SURGERY)

## 2023-08-23 PROCEDURE — 3077F SYST BP >= 140 MM HG: CPT | Mod: CPTII,,, | Performed by: THORACIC SURGERY (CARDIOTHORACIC VASCULAR SURGERY)

## 2023-08-23 PROCEDURE — 3008F BODY MASS INDEX DOCD: CPT | Mod: CPTII,,, | Performed by: THORACIC SURGERY (CARDIOTHORACIC VASCULAR SURGERY)

## 2023-08-23 PROCEDURE — 3077F PR MOST RECENT SYSTOLIC BLOOD PRESSURE >= 140 MM HG: ICD-10-PCS | Mod: CPTII,,, | Performed by: THORACIC SURGERY (CARDIOTHORACIC VASCULAR SURGERY)

## 2023-08-23 PROCEDURE — 4010F PR ACE/ARB THEARPY RXD/TAKEN: ICD-10-PCS | Mod: CPTII,,, | Performed by: THORACIC SURGERY (CARDIOTHORACIC VASCULAR SURGERY)

## 2023-08-23 PROCEDURE — 3044F PR MOST RECENT HEMOGLOBIN A1C LEVEL <7.0%: ICD-10-PCS | Mod: CPTII,,, | Performed by: THORACIC SURGERY (CARDIOTHORACIC VASCULAR SURGERY)

## 2023-08-23 NOTE — PROGRESS NOTES
History & Physical    SUBJECTIVE:     History of Present Illness:  The patient returns to the clinic for follow-up status post neoadjuvant chemo therapy and radiotherapy.  He had no further testing.  His interim history was complicated by urosepsis with pyelonephritis treated in [place].  Overall he is feeling well now      Chief Complaint   Patient presents with    Follow-up     EST PT-REFERRED BACK PER DR. YAO-SQUAMOUS CELL CA OF RT LUNG, DISCUSS SURGERY-COMPLETED CHEM0/RAD 23.    HX:  COPD, EMPHYSEMA, SMOKER, DM, HIGH CHOL, HTN, LUNG CA, LIVER LESIONS.  DR. MARSHALL VILLAFUERTE SAW PT ON 23, PFT'S DONE 23, CARD CL OBTAINED-CIS- MAY 4/25/23 AND HE DISCUSSED CASE WITH DR. YAO.       Review of patient's allergies indicates:   Allergen Reactions    Ace inhibitors Swelling    Penicillins Itching and Rash       Current Outpatient Medications   Medication Sig Dispense Refill    aspirin (ECOTRIN) 81 MG EC tablet Take 81 mg by mouth once daily.      dicyclomine (BENTYL) 20 mg tablet Take 20 mg by mouth once daily. Once daily in the AM.      glimepiride (AMARYL) 2 MG tablet Take 2 mg by mouth before breakfast.      losartan (COZAAR) 100 MG tablet Take 100 mg by mouth once daily.      metoprolol tartrate (LOPRESSOR) 50 MG tablet Take 50 mg by mouth 2 (two) times daily.      oxyCODONE-acetaminophen (PERCOCET)  mg per tablet Take 1 tablet by mouth every 4 (four) hours as needed. for pain.      pantoprazole (PROTONIX) 40 MG tablet Take 40 mg by mouth.      pravastatin (PRAVACHOL) 20 MG tablet Take 20 mg by mouth once daily.      TRULICITY 1.5 mg/0.5 mL pen injector SMARTSI pre-filled pen syringe SUB-Q Once a Week      water Liqd 150 mL with MILK OF MAGNESIA 400 mg/5 mL Susp 400 mg, diphenhydrAMINE 12.5 mg/5 mL Elix 60 mg, nystatin 100,000 unit/mL Susp 500,000 Units Take 5 mLs by mouth.       No current facility-administered medications for this visit.       Past Medical History:   Diagnosis Date     "Diabetes mellitus     High cholesterol     Hypertension     Lung cancer      Past Surgical History:   Procedure Laterality Date    BACK SURGERY      DEBRIDEMENT TENNIS ELBOW Right     PERIPHERALLY INSERTED CENTRAL CATHETER INSERTION Left 2023    Procedure: Insertion-picc;  Surgeon: Letha Silver MD;  Location: [facility];  Service: Oncology;  Laterality: Left;    right arm       Family History   Problem Relation Age of Onset    Coronary artery disease Mother     No Known Problems Father     Coronary artery disease Brother     Cancer Brother     Diabetes Brother     Heart failure Brother     Diabetes Brother     Leukemia Brother     Leukemia Daughter      Social History     Tobacco Use    Smoking status: Every Day     Current packs/day: 0.00     Types: Cigarettes     Start date:      Last attempt to quit: 2023     Years since quittin.4    Smokeless tobacco: Never   Substance Use Topics    Alcohol use: Not Currently     Comment: ocassional    Drug use: Never        Review of Systems:  Review of Systems   Constitutional: Negative.    HENT: Negative.     Eyes: Negative.    Respiratory: Negative.     Cardiovascular: Negative.    Gastrointestinal: Negative.    Endocrine: Negative.    Genitourinary: Negative.    Musculoskeletal: Negative.         Claudications   Skin: Negative.    Allergic/Immunologic: Negative.    Neurological: Negative.    Hematological: Negative.    Psychiatric/Behavioral: Negative.         OBJECTIVE:     Vital Signs (Most Recent)  Pulse: 67 (23 1038)  BP: (!) 145/88 (23 1038)  SpO2: 98 % (23 1038)  6' 1" (1.854 m)  92.3 kg (203 lb 6.4 oz)     Physical Exam:  Physical Exam  Vitals reviewed.   Constitutional:       Appearance: Normal appearance.   HENT:      Head: Normocephalic and atraumatic.      Nose: Nose normal.      Mouth/Throat:      Mouth: Mucous membranes are dry.      Pharynx: Oropharynx is clear.   Eyes:      Extraocular Movements: Extraocular movements " intact.      Conjunctiva/sclera: Conjunctivae normal.      Pupils: Pupils are equal, round, and reactive to light.   Cardiovascular:      Rate and Rhythm: Normal rate and regular rhythm.      Pulses: Normal pulses.   Pulmonary:      Effort: Pulmonary effort is normal.      Breath sounds: Normal breath sounds.   Abdominal:      General: Abdomen is flat.      Palpations: Abdomen is soft.   Musculoskeletal:         General: Normal range of motion.      Cervical back: Neck supple.   Skin:     General: Skin is warm and dry.   Neurological:      General: No focal deficit present.   Psychiatric:         Mood and Affect: Mood normal.         Laboratory:  None      Diagnostic Results:  None      ASSESSMENT/PLAN:     At this point the patient will need a CT of the chest with contrast to follow-up on his cancer.  I also will get a ventilation perfusion scan to establish his candidacy for possible pneumonectomy.  Further plan to follow

## 2023-08-24 ENCOUNTER — LAB VISIT (OUTPATIENT)
Dept: LAB | Facility: HOSPITAL | Age: 68
End: 2023-08-24
Attending: INTERNAL MEDICINE
Payer: MEDICARE

## 2023-08-24 ENCOUNTER — OFFICE VISIT (OUTPATIENT)
Dept: HEMATOLOGY/ONCOLOGY | Facility: CLINIC | Age: 68
End: 2023-08-24
Payer: MEDICARE

## 2023-08-24 VITALS
HEART RATE: 65 BPM | SYSTOLIC BLOOD PRESSURE: 130 MMHG | HEIGHT: 73 IN | WEIGHT: 204 LBS | DIASTOLIC BLOOD PRESSURE: 85 MMHG | TEMPERATURE: 98 F | OXYGEN SATURATION: 99 % | BODY MASS INDEX: 27.04 KG/M2

## 2023-08-24 DIAGNOSIS — C34.91 SQUAMOUS CELL CARCINOMA OF RIGHT LUNG: ICD-10-CM

## 2023-08-24 DIAGNOSIS — Z79.899 ON ANTINEOPLASTIC CHEMOTHERAPY: ICD-10-CM

## 2023-08-24 DIAGNOSIS — C34.11 MALIGNANT NEOPLASM OF UPPER LOBE OF RIGHT LUNG: Primary | ICD-10-CM

## 2023-08-24 LAB
ALBUMIN SERPL-MCNC: 3 G/DL (ref 3.4–4.8)
ALBUMIN/GLOB SERPL: 0.9 RATIO (ref 1.1–2)
ALP SERPL-CCNC: 77 UNIT/L (ref 40–150)
ALT SERPL-CCNC: <5 UNIT/L (ref 0–55)
AST SERPL-CCNC: 5 UNIT/L (ref 5–34)
BASOPHILS # BLD AUTO: 0 X10(3)/MCL
BASOPHILS NFR BLD AUTO: 0 %
BILIRUB SERPL-MCNC: 0.5 MG/DL
BUN SERPL-MCNC: 9.6 MG/DL (ref 8.4–25.7)
CALCIUM SERPL-MCNC: 8.8 MG/DL (ref 8.8–10)
CHLORIDE SERPL-SCNC: 105 MMOL/L (ref 98–107)
CO2 SERPL-SCNC: 26 MMOL/L (ref 23–31)
CREAT SERPL-MCNC: 1.09 MG/DL (ref 0.73–1.18)
EOSINOPHIL # BLD AUTO: 0.05 X10(3)/MCL (ref 0–0.9)
EOSINOPHIL NFR BLD AUTO: 1.1 %
ERYTHROCYTE [DISTWIDTH] IN BLOOD BY AUTOMATED COUNT: 20.1 % (ref 11.5–17)
GFR SERPLBLD CREATININE-BSD FMLA CKD-EPI: >60 MLS/MIN/1.73/M2
GLOBULIN SER-MCNC: 3.5 GM/DL (ref 2.4–3.5)
GLUCOSE SERPL-MCNC: 119 MG/DL (ref 82–115)
HCT VFR BLD AUTO: 29.6 % (ref 42–52)
HGB BLD-MCNC: 9.5 G/DL (ref 14–18)
IMM GRANULOCYTES # BLD AUTO: 0 X10(3)/MCL (ref 0–0.04)
IMM GRANULOCYTES NFR BLD AUTO: 0 %
LYMPHOCYTES # BLD AUTO: 0.55 X10(3)/MCL (ref 0.6–4.6)
LYMPHOCYTES NFR BLD AUTO: 12.4 %
MCH RBC QN AUTO: 33.2 PG (ref 27–31)
MCHC RBC AUTO-ENTMCNC: 32.1 G/DL (ref 33–36)
MCV RBC AUTO: 103.5 FL (ref 80–94)
MONOCYTES # BLD AUTO: 0.85 X10(3)/MCL (ref 0.1–1.3)
MONOCYTES NFR BLD AUTO: 19.1 %
NEUTROPHILS # BLD AUTO: 2.99 X10(3)/MCL (ref 2.1–9.2)
NEUTROPHILS NFR BLD AUTO: 67.4 %
PLATELET # BLD AUTO: 211 X10(3)/MCL (ref 130–400)
PMV BLD AUTO: 9.2 FL (ref 7.4–10.4)
POTASSIUM SERPL-SCNC: 3.5 MMOL/L (ref 3.5–5.1)
PROT SERPL-MCNC: 6.5 GM/DL (ref 5.8–7.6)
RBC # BLD AUTO: 2.86 X10(6)/MCL (ref 4.7–6.1)
SODIUM SERPL-SCNC: 140 MMOL/L (ref 136–145)
WBC # SPEC AUTO: 4.44 X10(3)/MCL (ref 4.5–11.5)

## 2023-08-24 PROCEDURE — 99999 PR PBB SHADOW E&M-EST. PATIENT-LVL IV: CPT | Mod: PBBFAC,,, | Performed by: INTERNAL MEDICINE

## 2023-08-24 PROCEDURE — 3008F BODY MASS INDEX DOCD: CPT | Mod: CPTII,S$GLB,, | Performed by: INTERNAL MEDICINE

## 2023-08-24 PROCEDURE — 3075F PR MOST RECENT SYSTOLIC BLOOD PRESS GE 130-139MM HG: ICD-10-PCS | Mod: CPTII,S$GLB,, | Performed by: INTERNAL MEDICINE

## 2023-08-24 PROCEDURE — 85025 COMPLETE CBC W/AUTO DIFF WBC: CPT

## 2023-08-24 PROCEDURE — 3288F PR FALLS RISK ASSESSMENT DOCUMENTED: ICD-10-PCS | Mod: CPTII,S$GLB,, | Performed by: INTERNAL MEDICINE

## 2023-08-24 PROCEDURE — 1159F PR MEDICATION LIST DOCUMENTED IN MEDICAL RECORD: ICD-10-PCS | Mod: CPTII,S$GLB,, | Performed by: INTERNAL MEDICINE

## 2023-08-24 PROCEDURE — 1125F PR PAIN SEVERITY QUANTIFIED, PAIN PRESENT: ICD-10-PCS | Mod: CPTII,S$GLB,, | Performed by: INTERNAL MEDICINE

## 2023-08-24 PROCEDURE — 36415 COLL VENOUS BLD VENIPUNCTURE: CPT

## 2023-08-24 PROCEDURE — 1125F AMNT PAIN NOTED PAIN PRSNT: CPT | Mod: CPTII,S$GLB,, | Performed by: INTERNAL MEDICINE

## 2023-08-24 PROCEDURE — 3079F DIAST BP 80-89 MM HG: CPT | Mod: CPTII,S$GLB,, | Performed by: INTERNAL MEDICINE

## 2023-08-24 PROCEDURE — 3079F PR MOST RECENT DIASTOLIC BLOOD PRESSURE 80-89 MM HG: ICD-10-PCS | Mod: CPTII,S$GLB,, | Performed by: INTERNAL MEDICINE

## 2023-08-24 PROCEDURE — 3288F FALL RISK ASSESSMENT DOCD: CPT | Mod: CPTII,S$GLB,, | Performed by: INTERNAL MEDICINE

## 2023-08-24 PROCEDURE — 99215 PR OFFICE/OUTPT VISIT, EST, LEVL V, 40-54 MIN: ICD-10-PCS | Mod: S$GLB,,, | Performed by: INTERNAL MEDICINE

## 2023-08-24 PROCEDURE — 3044F HG A1C LEVEL LT 7.0%: CPT | Mod: CPTII,S$GLB,, | Performed by: INTERNAL MEDICINE

## 2023-08-24 PROCEDURE — 99999 PR PBB SHADOW E&M-EST. PATIENT-LVL IV: ICD-10-PCS | Mod: PBBFAC,,, | Performed by: INTERNAL MEDICINE

## 2023-08-24 PROCEDURE — 1101F PR PT FALLS ASSESS DOC 0-1 FALLS W/OUT INJ PAST YR: ICD-10-PCS | Mod: CPTII,S$GLB,, | Performed by: INTERNAL MEDICINE

## 2023-08-24 PROCEDURE — 4010F PR ACE/ARB THEARPY RXD/TAKEN: ICD-10-PCS | Mod: CPTII,S$GLB,, | Performed by: INTERNAL MEDICINE

## 2023-08-24 PROCEDURE — 4010F ACE/ARB THERAPY RXD/TAKEN: CPT | Mod: CPTII,S$GLB,, | Performed by: INTERNAL MEDICINE

## 2023-08-24 PROCEDURE — 3044F PR MOST RECENT HEMOGLOBIN A1C LEVEL <7.0%: ICD-10-PCS | Mod: CPTII,S$GLB,, | Performed by: INTERNAL MEDICINE

## 2023-08-24 PROCEDURE — 1101F PT FALLS ASSESS-DOCD LE1/YR: CPT | Mod: CPTII,S$GLB,, | Performed by: INTERNAL MEDICINE

## 2023-08-24 PROCEDURE — 99215 OFFICE O/P EST HI 40 MIN: CPT | Mod: S$GLB,,, | Performed by: INTERNAL MEDICINE

## 2023-08-24 PROCEDURE — 3075F SYST BP GE 130 - 139MM HG: CPT | Mod: CPTII,S$GLB,, | Performed by: INTERNAL MEDICINE

## 2023-08-24 PROCEDURE — 3008F PR BODY MASS INDEX (BMI) DOCUMENTED: ICD-10-PCS | Mod: CPTII,S$GLB,, | Performed by: INTERNAL MEDICINE

## 2023-08-24 PROCEDURE — 80053 COMPREHEN METABOLIC PANEL: CPT

## 2023-08-24 PROCEDURE — 1159F MED LIST DOCD IN RCRD: CPT | Mod: CPTII,S$GLB,, | Performed by: INTERNAL MEDICINE

## 2023-08-24 NOTE — PROGRESS NOTES
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  The mass abuts the right main pulmonary artery and right main stem bronchus.  Subcentimeter mediastinal lymph node noted.  One of LN slightly hypermetabolic with SUV of 3 and is located laterally.  Patchy hypermetabolism within the liver as suspected, however, there are few tiny focal areas of hypermetabolism out of proportion to the rest of the liver.  At least 1 of this corresponds to a low-density focus seen in the noncontrast exam.  CT 5/12/2023: changes consistent with COPD and emphysema in the lungs bilaterally with multiple bulla bleb seen. mass seen in the right hilum extending into the right upper lobe.  It is somewhat infiltrative.  This was seen on the prior PET-CT as well.  The findings are not significantly changed.  There are multiple associated satellite nodules in the right upper lobe.  Rough dimensions of the hilar component of the mass is 5.4 x 5.1 cm and rough measurements of the dominant lesion in the right upper lobe is 11 cm x 3.6 cm.  The mass extends into the right mainstem and right upper lobe bronchus.  This was seen on the prior examination as well.  There is narrowing of the right upper lobe bronchus there is some narrowing of the right upper lobe pulmonary arteries.  Findings are similar to the prior examination.  Scattered areas of punctate subcentimeter nodularity is seen in the right lower lobe.  These are too small to characterize and similar  changes were seen previously.    No pleural effusion is seen.  Thoracic aorta is normal in caliber.  There is some mediastinal lymphadenopathy seen.  Reference lymph node is measured in the precarinal region on image number 39 series 2 at 1.5 x 0.9 cm.  This is similar to the prior examination.  Scattered punctate areas of hypoattenuation are seen throughout the liver.  Similar changes were seen previously.  The lesions are too small to characterize but may represent cysts.  The largest lesion is seen in segment 4.  It does not enhance and measures 2 cm x 1.5 cm.  Findings may represent a cyst.  No adrenal nodules are seen.  CT Head  @ [facility]. Mild chronic white matter changes. Old left caudate lobe lesion. 2. No evidence of metastatic disease.    Pathology:  02/10/2023:  Right upper lobe lung needle biopsy: Scattered highly atypical squamous cells, suspicious for squamous cell carcinoma in a background of abundant acute inflammation and necrosis  Right upper lobe, brushing:  Positive for malignant cells.  Squamous cell carcinoma, moderately differentiated.  Right upper lobe biopsy squamous cell carcinoma, moderately differentiated with focal keratinization.  Right lung washings positive for malignant cells.  Squamous cell carcinoma in a background of abundant necrosis.    CLINICAL HISTORY:       Patient: Deondre Ocampo IV is a 68 y.o. male.  Kindly referred for newly diagnosed squamous cell carcinoma of the lung.    He reports that he was being evaluated for a right side pain went imaging studies showed a mass in the lung.  I do not have the CTs.  But he eventually underwent bronchoscopy that showed squamous cell carcinoma.  PET-CT showed the right suprahilar mass with possible mediastinal lymphadenopathy and questionable liver lesions.  He is here to discuss further recommendations.    He is here with his wife.  Patient has history of 53 pack year smoking.  He reports that his breathing is fine.   He denies any chest pain, short of breath or coughing.  No hemoptysis.  He has an appointment to see the radiation oncologist next week.    Chief Complaint: Other (Headaches in the back of his head for 3 week, pain level 7  Pt was in the hospital at [facility] from 7/28/23 to 8/2/23 for a blood infection)      Interval History:  Patient presents today for follow, he is with his wife. He completed 6 cycle of weekly Carbo/Taxol on 7/12/23 along with XRT.  He was in [place] visiting his family and on 07/28/2023 he collapsed while he was at home.  He was brought to the hospital and he was found to be septic.  He reports that he have a UTI about 2 days prior, he was diagnosed with pyelonephritis as well.  He was placed on antibiotics and was discharged home.  He has been doing well since then.  His blood counts continue to improve.  Otherwise, he is feeling good.  He saw Dr. Herrera yesterday that order some imaging studies to see if he will be able to have pneumonectomy.      Past Medical History:   Diagnosis Date    Diabetes mellitus     High cholesterol     Hypertension     Lung cancer       Past Surgical History:   Procedure Laterality Date    BACK SURGERY      DEBRIDEMENT TENNIS ELBOW Right     PERIPHERALLY INSERTED CENTRAL CATHETER INSERTION Left 5/29/2023    Procedure: Insertion-picc;  Surgeon: Letha Silver MD;  Location: [facility];  Service: Oncology;  Laterality: Left;    right arm       Family History   Problem Relation Age of Onset    Coronary artery disease Mother     No Known Problems Father     Coronary artery disease Brother     Cancer Brother     Diabetes Brother     Heart failure Brother     Diabetes Brother     Leukemia Brother     Leukemia Daughter      Social Connections: Not on file       Review of patient's allergies indicates:   Allergen Reactions    Ace inhibitors Swelling    Penicillins Itching and Rash      Current Outpatient Medications on File Prior to Visit   Medication Sig Dispense  Refill    aspirin (ECOTRIN) 81 MG EC tablet Take 81 mg by mouth once daily.      dicyclomine (BENTYL) 20 mg tablet Take 20 mg by mouth once daily. Once daily in the AM.      glimepiride (AMARYL) 2 MG tablet Take 2 mg by mouth before breakfast.      losartan (COZAAR) 100 MG tablet Take 100 mg by mouth once daily.      metoprolol tartrate (LOPRESSOR) 50 MG tablet Take 50 mg by mouth 2 (two) times daily.      oxyCODONE-acetaminophen (PERCOCET)  mg per tablet Take 1 tablet by mouth every 4 (four) hours as needed. for pain.      pantoprazole (PROTONIX) 40 MG tablet Take 40 mg by mouth.      pravastatin (PRAVACHOL) 20 MG tablet Take 20 mg by mouth once daily.      TRULICITY 1.5 mg/0.5 mL pen injector SMARTSI pre-filled pen syringe SUB-Q Once a Week      water Liqd 150 mL with MILK OF MAGNESIA 400 mg/5 mL Susp 400 mg, diphenhydrAMINE 12.5 mg/5 mL Elix 60 mg, nystatin 100,000 unit/mL Susp 500,000 Units Take 5 mLs by mouth.       No current facility-administered medications on file prior to visit.      Review of Systems   Constitutional:  Negative for activity change, appetite change, chills, diaphoresis, fatigue, fever and unexpected weight change.   HENT:  Negative for nasal congestion, mouth sores, nosebleeds, sinus pressure/congestion, sore throat and trouble swallowing.    Eyes: Negative.    Respiratory:  Negative for cough and shortness of breath.    Cardiovascular:  Negative for chest pain and palpitations.   Gastrointestinal:  Negative for abdominal distention, abdominal pain, blood in stool, change in bowel habit, constipation, diarrhea, nausea, vomiting and change in bowel habit.   Endocrine: Negative.    Genitourinary:  Negative for bladder incontinence, decreased urine volume, difficulty urinating, dysuria, frequency, hematuria and urgency.   Musculoskeletal:  Positive for back pain and neck pain. Negative for arthralgias, gait problem, joint swelling, leg pain and myalgias.   Integumentary:  Negative  "for rash.   Allergic/Immunologic: Negative.    Neurological:  Negative for dizziness, tremors, syncope, weakness, light-headedness, numbness, headaches and memory loss.   Hematological:  Negative for adenopathy. Does not bruise/bleed easily.   Psychiatric/Behavioral:  Negative for agitation, confusion, hallucinations, sleep disturbance and suicidal ideas. The patient is not nervous/anxious.               Vitals:    08/24/23 1528   BP: 130/85   BP Location: Left arm   Patient Position: Sitting   Pulse: 65   Temp: 98.3 °F (36.8 °C)   TempSrc: Oral   SpO2: 99%   Weight: 92.5 kg (204 lb)   Height: 6' 1" (1.854 m)       Wt Readings from Last 6 Encounters:   08/24/23 92.5 kg (204 lb)   08/23/23 92.3 kg (203 lb 6.4 oz)   07/26/23 95.2 kg (209 lb 12.8 oz)   06/30/23 96.7 kg (213 lb 1.6 oz)   06/12/23 96.8 kg (213 lb 8 oz)   06/05/23 96.5 kg (212 lb 12.8 oz)        Body mass index is 26.91 kg/m².  Body surface area is 2.18 meters squared.  Physical Exam  Vitals and nursing note reviewed.   Constitutional:       General: He is not in acute distress.     Appearance: Normal appearance.   HENT:      Head: Normocephalic and atraumatic.      Mouth/Throat:      Mouth: Mucous membranes are moist.   Eyes:      General: No scleral icterus.     Extraocular Movements: Extraocular movements intact.      Conjunctiva/sclera: Conjunctivae normal.   Neck:      Vascular: No JVD.   Cardiovascular:      Rate and Rhythm: Normal rate and regular rhythm.      Heart sounds: No murmur heard.  Pulmonary:      Effort: Pulmonary effort is normal.      Breath sounds: Decreased breath sounds present. No wheezing or rhonchi.   Abdominal:      General: Bowel sounds are normal. There is no distension.      Palpations: Abdomen is soft.      Tenderness: There is no abdominal tenderness.   Musculoskeletal:         General: No swelling or deformity.      Cervical back: Neck supple.   Lymphadenopathy:      Head:      Right side of head: No submandibular " adenopathy.      Left side of head: No submandibular adenopathy.      Cervical: No cervical adenopathy.      Upper Body:      Right upper body: No supraclavicular or axillary adenopathy.      Left upper body: No supraclavicular or axillary adenopathy.      Lower Body: No right inguinal adenopathy. No left inguinal adenopathy.   Skin:     General: Skin is warm.      Coloration: Skin is not jaundiced.      Findings: No rash.   Neurological:      General: No focal deficit present.      Mental Status: He is alert and oriented to person, place, and time.      Cranial Nerves: Cranial nerves 2-12 are intact.   Psychiatric:         Attention and Perception: Attention normal.         Behavior: Behavior is cooperative.       ECOG SCORE    1 - Restricted in strenuous activity-ambulatory and able to carry out work of a light nature         Laboratory:  CBC with Differential:  Lab Results   Component Value Date    WBC 4.44 (L) 08/24/2023    RBC 2.86 (L) 08/24/2023    HGB 9.5 (L) 08/24/2023    HCT 29.6 (L) 08/24/2023    .5 (H) 08/24/2023    MCH 33.2 (H) 08/24/2023    MCHC 32.1 (L) 08/24/2023    RDW 20.1 (H) 08/24/2023     08/24/2023    MPV 9.2 08/24/2023        CMP:  Sodium Level   Date Value Ref Range Status   07/26/2023 137 136 - 145 mmol/L Final     Potassium Level   Date Value Ref Range Status   07/26/2023 3.6 3.5 - 5.1 mmol/L Final     Carbon Dioxide   Date Value Ref Range Status   07/26/2023 27 23 - 31 mmol/L Final     Blood Urea Nitrogen   Date Value Ref Range Status   07/26/2023 5.6 (L) 8.4 - 25.7 mg/dL Final     Creatinine   Date Value Ref Range Status   07/26/2023 0.75 0.73 - 1.18 mg/dL Final     Calcium Level Total   Date Value Ref Range Status   07/26/2023 8.6 (L) 8.8 - 10.0 mg/dL Final     Albumin Level   Date Value Ref Range Status   07/26/2023 3.2 (L) 3.4 - 4.8 g/dL Final     Bilirubin Total   Date Value Ref Range Status   07/26/2023 0.6 <=1.5 mg/dL Final     Alkaline Phosphatase   Date Value Ref  Range Status   07/26/2023 69 40 - 150 unit/L Final     Aspartate Aminotransferase   Date Value Ref Range Status   07/26/2023 6 5 - 34 unit/L Final     Alanine Aminotransferase   Date Value Ref Range Status   07/26/2023 5 0 - 55 unit/L Final         Assessment:       1. Malignant neoplasm of upper lobe of right lung    2. Squamous cell carcinoma of right lung    3. 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 Chand, same day labs: CBC, CMP - pt requests mid morning or afternoon appts  Keep appt with Dr Chand--he ordered imaging studies  Again, if no surgery, then maintenance immunotherapy (durvalumab)  Continue follow ups with Dr. Hoff    The patient was seen, interviewed and examined. Pertinent lab and radiology studies were reviewed.   The patient was given ample opportunity to ask questions, and to the best of my abilities, all questions answered to satisfaction; patient demonstrated understanding of what we discussed and agreeable to the plan. Pt instructed to call should develop concerning signs/symptoms or have further questions.     Letha Silver MD  Hematology/Oncology

## 2023-08-29 ENCOUNTER — PATIENT MESSAGE (OUTPATIENT)
Dept: HEMATOLOGY/ONCOLOGY | Facility: CLINIC | Age: 68
End: 2023-08-29
Payer: MEDICARE

## 2023-09-01 DIAGNOSIS — C34.91 SQUAMOUS CELL CARCINOMA OF RIGHT LUNG: ICD-10-CM

## 2023-09-01 DIAGNOSIS — C34.01 LUNG CANCER, MAIN BRONCHUS, RIGHT: Primary | ICD-10-CM

## 2023-09-05 ENCOUNTER — HOSPITAL ENCOUNTER (OUTPATIENT)
Dept: RADIOLOGY | Facility: HOSPITAL | Age: 68
Discharge: HOME | End: 2023-09-05
Attending: THORACIC SURGERY (CARDIOTHORACIC VASCULAR SURGERY)
Payer: MEDICARE

## 2023-09-05 DIAGNOSIS — C34.91 SQUAMOUS CELL CARCINOMA OF RIGHT LUNG: ICD-10-CM

## 2023-09-05 DIAGNOSIS — C34.01 LUNG CANCER, MAIN BRONCHUS, RIGHT: ICD-10-CM

## 2023-09-05 PROCEDURE — A9538 TC99M PYROPHOSPHATE: HCPCS

## 2023-09-05 PROCEDURE — 71046 X-RAY EXAM CHEST 2 VIEWS: CPT | Mod: TC

## 2023-09-08 DIAGNOSIS — K76.9 LIVER LESION: ICD-10-CM

## 2023-09-08 DIAGNOSIS — C34.11 MALIGNANT NEOPLASM OF UPPER LOBE OF RIGHT LUNG: Primary | ICD-10-CM

## 2023-09-08 DIAGNOSIS — C34.91 SQUAMOUS CELL CARCINOMA OF RIGHT LUNG: ICD-10-CM

## 2023-09-15 ENCOUNTER — HOSPITAL ENCOUNTER (OUTPATIENT)
Dept: RADIOLOGY | Facility: HOSPITAL | Age: 68
Discharge: HOME | End: 2023-09-15
Attending: INTERNAL MEDICINE
Payer: MEDICARE

## 2023-09-15 DIAGNOSIS — C34.91 SQUAMOUS CELL CARCINOMA OF RIGHT LUNG: ICD-10-CM

## 2023-09-15 DIAGNOSIS — K76.9 LIVER LESION: ICD-10-CM

## 2023-09-15 DIAGNOSIS — C34.11 MALIGNANT NEOPLASM OF UPPER LOBE OF RIGHT LUNG: ICD-10-CM

## 2023-09-15 PROCEDURE — 25500020 PHARM REV CODE 255: Performed by: INTERNAL MEDICINE

## 2023-09-15 PROCEDURE — 74177 CT ABD & PELVIS W/CONTRAST: CPT | Mod: TC

## 2023-09-15 PROCEDURE — 71260 CT THORAX DX C+: CPT | Mod: TC

## 2023-09-15 RX ADMIN — IOPAMIDOL 100 ML: 755 INJECTION, SOLUTION INTRAVENOUS at 02:09

## 2023-09-20 ENCOUNTER — OFFICE VISIT (OUTPATIENT)
Dept: HEMATOLOGY/ONCOLOGY | Facility: CLINIC | Age: 68
End: 2023-09-20
Payer: MEDICARE

## 2023-09-20 VITALS
RESPIRATION RATE: 17 BRPM | WEIGHT: 209.19 LBS | SYSTOLIC BLOOD PRESSURE: 152 MMHG | TEMPERATURE: 98 F | OXYGEN SATURATION: 99 % | BODY MASS INDEX: 27.73 KG/M2 | HEIGHT: 73 IN | HEART RATE: 61 BPM | DIASTOLIC BLOOD PRESSURE: 98 MMHG

## 2023-09-20 DIAGNOSIS — C34.91 SQUAMOUS CELL CARCINOMA OF RIGHT LUNG: ICD-10-CM

## 2023-09-20 DIAGNOSIS — C34.01 LUNG CANCER, MAIN BRONCHUS, RIGHT: Primary | ICD-10-CM

## 2023-09-20 DIAGNOSIS — E03.2 DRUG-INDUCED HYPOTHYROIDISM: ICD-10-CM

## 2023-09-20 DIAGNOSIS — C34.11 MALIGNANT NEOPLASM OF UPPER LOBE OF RIGHT LUNG: ICD-10-CM

## 2023-09-20 PROCEDURE — 1126F AMNT PAIN NOTED NONE PRSNT: CPT | Mod: CPTII,S$GLB,, | Performed by: INTERNAL MEDICINE

## 2023-09-20 PROCEDURE — 99999 PR PBB SHADOW E&M-EST. PATIENT-LVL IV: CPT | Mod: PBBFAC,,, | Performed by: INTERNAL MEDICINE

## 2023-09-20 PROCEDURE — 3080F DIAST BP >= 90 MM HG: CPT | Mod: CPTII,S$GLB,, | Performed by: INTERNAL MEDICINE

## 2023-09-20 PROCEDURE — 3044F PR MOST RECENT HEMOGLOBIN A1C LEVEL <7.0%: ICD-10-PCS | Mod: CPTII,S$GLB,, | Performed by: INTERNAL MEDICINE

## 2023-09-20 PROCEDURE — 4010F PR ACE/ARB THEARPY RXD/TAKEN: ICD-10-PCS | Mod: CPTII,S$GLB,, | Performed by: INTERNAL MEDICINE

## 2023-09-20 PROCEDURE — 3044F HG A1C LEVEL LT 7.0%: CPT | Mod: CPTII,S$GLB,, | Performed by: INTERNAL MEDICINE

## 2023-09-20 PROCEDURE — 99214 OFFICE O/P EST MOD 30 MIN: CPT | Mod: S$GLB,,, | Performed by: INTERNAL MEDICINE

## 2023-09-20 PROCEDURE — 3077F SYST BP >= 140 MM HG: CPT | Mod: CPTII,S$GLB,, | Performed by: INTERNAL MEDICINE

## 2023-09-20 PROCEDURE — 3008F BODY MASS INDEX DOCD: CPT | Mod: CPTII,S$GLB,, | Performed by: INTERNAL MEDICINE

## 2023-09-20 PROCEDURE — 1101F PT FALLS ASSESS-DOCD LE1/YR: CPT | Mod: CPTII,S$GLB,, | Performed by: INTERNAL MEDICINE

## 2023-09-20 PROCEDURE — 99999 PR PBB SHADOW E&M-EST. PATIENT-LVL IV: ICD-10-PCS | Mod: PBBFAC,,, | Performed by: INTERNAL MEDICINE

## 2023-09-20 PROCEDURE — 3080F PR MOST RECENT DIASTOLIC BLOOD PRESSURE >= 90 MM HG: ICD-10-PCS | Mod: CPTII,S$GLB,, | Performed by: INTERNAL MEDICINE

## 2023-09-20 PROCEDURE — 3288F FALL RISK ASSESSMENT DOCD: CPT | Mod: CPTII,S$GLB,, | Performed by: INTERNAL MEDICINE

## 2023-09-20 PROCEDURE — 4010F ACE/ARB THERAPY RXD/TAKEN: CPT | Mod: CPTII,S$GLB,, | Performed by: INTERNAL MEDICINE

## 2023-09-20 PROCEDURE — 1159F MED LIST DOCD IN RCRD: CPT | Mod: CPTII,S$GLB,, | Performed by: INTERNAL MEDICINE

## 2023-09-20 PROCEDURE — 3077F PR MOST RECENT SYSTOLIC BLOOD PRESSURE >= 140 MM HG: ICD-10-PCS | Mod: CPTII,S$GLB,, | Performed by: INTERNAL MEDICINE

## 2023-09-20 PROCEDURE — 1159F PR MEDICATION LIST DOCUMENTED IN MEDICAL RECORD: ICD-10-PCS | Mod: CPTII,S$GLB,, | Performed by: INTERNAL MEDICINE

## 2023-09-20 PROCEDURE — 3288F PR FALLS RISK ASSESSMENT DOCUMENTED: ICD-10-PCS | Mod: CPTII,S$GLB,, | Performed by: INTERNAL MEDICINE

## 2023-09-20 PROCEDURE — 1126F PR PAIN SEVERITY QUANTIFIED, NO PAIN PRESENT: ICD-10-PCS | Mod: CPTII,S$GLB,, | Performed by: INTERNAL MEDICINE

## 2023-09-20 PROCEDURE — 1101F PR PT FALLS ASSESS DOC 0-1 FALLS W/OUT INJ PAST YR: ICD-10-PCS | Mod: CPTII,S$GLB,, | Performed by: INTERNAL MEDICINE

## 2023-09-20 PROCEDURE — 3008F PR BODY MASS INDEX (BMI) DOCUMENTED: ICD-10-PCS | Mod: CPTII,S$GLB,, | Performed by: INTERNAL MEDICINE

## 2023-09-20 PROCEDURE — 99214 PR OFFICE/OUTPT VISIT, EST, LEVL IV, 30-39 MIN: ICD-10-PCS | Mod: S$GLB,,, | Performed by: INTERNAL MEDICINE

## 2023-09-20 NOTE — PROGRESS NOTES
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  The mass abuts the right main pulmonary artery and right main stem bronchus.  Subcentimeter mediastinal lymph node noted.  One of LN slightly hypermetabolic with SUV of 3 and is located laterally.  Patchy hypermetabolism within the liver as suspected, however, there are few tiny focal areas of hypermetabolism out of proportion to the rest of the liver.  At least 1 of this corresponds to a low-density focus seen in the noncontrast exam.  CT 5/12/2023: changes consistent with COPD and emphysema in the lungs bilaterally with multiple bulla bleb seen. mass seen in the right hilum extending into the right upper lobe.  It is somewhat infiltrative.  This was seen on the prior PET-CT as well.  The findings are not significantly changed.  There are multiple associated satellite nodules in the right upper lobe.  Rough dimensions of the hilar component of the mass is 5.4 x 5.1 cm and rough measurements of the dominant lesion in the right upper lobe is 11 cm x 3.6 cm.  The mass extends into the right mainstem and right upper lobe bronchus.  This was seen on the prior examination as well.  There is narrowing of the right upper lobe bronchus there is some narrowing of the right upper lobe pulmonary arteries.  Findings are similar to the prior examination.  Scattered areas of punctate subcentimeter nodularity is seen in the right lower lobe.  These are too small to characterize and similar  changes were seen previously.    No pleural effusion is seen.  Thoracic aorta is normal in caliber.  There is some mediastinal lymphadenopathy seen.  Reference lymph node is measured in the precarinal region on image number 39 series 2 at 1.5 x 0.9 cm.  This is similar to the prior examination.  Scattered punctate areas of hypoattenuation are seen throughout the liver.  Similar changes were seen previously.  The lesions are too small to characterize but may represent cysts.  The largest lesion is seen in segment 4.  It does not enhance and measures 2 cm x 1.5 cm.  Findings may represent a cyst.  No adrenal nodules are seen.  CT Head  @ [facility]. Mild chronic white matter changes. Old left caudate lobe lesion. 2. No evidence of metastatic disease.  CT C/A/P 9/15/2023:   1. Interval resolution of previously seen endobronchial right hilar mass.  2. Improved aeration of the right upper lobe.  There are residual but improved patchy irregular opacities in the right upper lobe, predominantly peripherally located  3. Interval improvement of mediastinal lymph nodes  4. Unchanged hepatic cyst and too small to characterize hepatic hypodensities     Pathology:  02/10/2023:  Right upper lobe lung needle biopsy: Scattered highly atypical squamous cells, suspicious for squamous cell carcinoma in a background of abundant acute inflammation and necrosis  Right upper lobe, brushing:  Positive for malignant cells.  Squamous cell carcinoma, moderately differentiated.  Right upper lobe biopsy squamous cell carcinoma, moderately differentiated with focal keratinization.  Right lung washings positive for malignant cells.  Squamous cell carcinoma in a background of abundant necrosis.      CLINICAL HISTORY:       Patient: Deondre Ocampo IV is a 68 y.o. male.  Kindly referred for newly diagnosed squamous cell carcinoma of the lung.    He reports that he was being evaluated for a right side pain went imaging studies showed a  mass in the lung.  I do not have the CTs.  But he eventually underwent bronchoscopy that showed squamous cell carcinoma.  PET-CT showed the right suprahilar mass with possible mediastinal lymphadenopathy and questionable liver lesions.  He is here to discuss further recommendations.    He is here with his wife.  Patient has history of 53 pack year smoking.  He reports that his breathing is fine.  He denies any chest pain, short of breath or coughing.  No hemoptysis.  He has an appointment to see the radiation oncologist next week.    Chief Complaint: OTHER (No concerns today.)      Interval History:  Patient presents today for follow up to discuss CT scan results, he is with his wife. He completed 6 cycle of weekly Carbo/Taxol on 7/12/23 along with XRT.   He is doing well. Denies fever, chills, sweats. No chest pain or shortness of breath. No bleeding.  He was seen by Dr. Chand on 8/23/23,  CTA and ventilation perfusion scan were ordered to see if he would be a candidate for pneumonectomy. Ventilation perfusion scan has been completed, but CTA was denied by insurance and he is awaiting follow up with Dr. Chand to discuss results and the plan.        Past Medical History:   Diagnosis Date    Diabetes mellitus     High cholesterol     Hypertension     Lung cancer       Past Surgical History:   Procedure Laterality Date    BACK SURGERY      DEBRIDEMENT TENNIS ELBOW Right     PERIPHERALLY INSERTED CENTRAL CATHETER INSERTION Left 5/29/2023    Procedure: Insertion-picc;  Surgeon: Letha Silver MD;  Location: [facility];  Service: Oncology;  Laterality: Left;    right arm       Family History   Problem Relation Age of Onset    Coronary artery disease Mother     No Known Problems Father     Coronary artery disease Brother     Cancer Brother     Diabetes Brother     Heart failure Brother     Diabetes Brother     Leukemia Brother     Leukemia Daughter      Social Connections: Not on file       Review of patient's allergies  indicates:   Allergen Reactions    Ace inhibitors Swelling    Penicillins Itching and Rash      Current Outpatient Medications on File Prior to Visit   Medication Sig Dispense Refill    aspirin (ECOTRIN) 81 MG EC tablet Take 81 mg by mouth once daily.      dicyclomine (BENTYL) 20 mg tablet Take 20 mg by mouth once daily. Once daily in the AM.      glimepiride (AMARYL) 2 MG tablet Take 2 mg by mouth before breakfast.      losartan (COZAAR) 100 MG tablet Take 100 mg by mouth once daily.      metoprolol tartrate (LOPRESSOR) 50 MG tablet Take 50 mg by mouth 2 (two) times daily.      oxyCODONE-acetaminophen (PERCOCET)  mg per tablet Take 1 tablet by mouth every 4 (four) hours as needed. for pain.      pantoprazole (PROTONIX) 40 MG tablet Take 40 mg by mouth.      pravastatin (PRAVACHOL) 20 MG tablet Take 20 mg by mouth once daily.      TRULICITY 1.5 mg/0.5 mL pen injector SMARTSI pre-filled pen syringe SUB-Q Once a Week      water Liqd 150 mL with MILK OF MAGNESIA 400 mg/5 mL Susp 400 mg, diphenhydrAMINE 12.5 mg/5 mL Elix 60 mg, nystatin 100,000 unit/mL Susp 500,000 Units Take 5 mLs by mouth.       No current facility-administered medications on file prior to visit.      Review of Systems   Constitutional:  Negative for activity change, appetite change, chills, diaphoresis, fatigue, fever and unexpected weight change.   HENT:  Negative for nasal congestion, mouth sores, nosebleeds, sinus pressure/congestion, sore throat and trouble swallowing.    Eyes: Negative.    Respiratory:  Negative for cough and shortness of breath.    Cardiovascular:  Negative for chest pain and palpitations.   Gastrointestinal:  Negative for abdominal distention, abdominal pain, blood in stool, change in bowel habit, constipation, diarrhea, nausea and vomiting.   Endocrine: Negative.    Genitourinary:  Negative for bladder incontinence, decreased urine volume, difficulty urinating, dysuria, frequency, hematuria and urgency.  "  Musculoskeletal:  Positive for back pain and neck pain. Negative for arthralgias, gait problem, joint swelling, leg pain and myalgias.   Integumentary:  Negative for rash.   Allergic/Immunologic: Negative.    Neurological:  Negative for dizziness, tremors, syncope, weakness, light-headedness, numbness, headaches and memory loss.   Hematological:  Negative for adenopathy. Does not bruise/bleed easily.   Psychiatric/Behavioral:  Negative for agitation, confusion, hallucinations, sleep disturbance and suicidal ideas. The patient is not nervous/anxious.               Vitals:    09/20/23 1025   BP: (!) 152/98   BP Location: Left arm   Patient Position: Sitting   Pulse: 61   Resp: 17   Temp: 98.2 °F (36.8 °C)   TempSrc: Oral   SpO2: 99%   Weight: 94.9 kg (209 lb 3.2 oz)   Height: 6' 1" (1.854 m)         Wt Readings from Last 6 Encounters:   09/20/23 94.9 kg (209 lb 3.2 oz)   08/24/23 92.5 kg (204 lb)   08/23/23 92.3 kg (203 lb 6.4 oz)   07/26/23 95.2 kg (209 lb 12.8 oz)   06/30/23 96.7 kg (213 lb 1.6 oz)   06/12/23 96.8 kg (213 lb 8 oz)        Body mass index is 27.6 kg/m².  Body surface area is 2.21 meters squared.  Physical Exam  Vitals and nursing note reviewed.   Constitutional:       General: He is not in acute distress.     Appearance: Normal appearance.   HENT:      Head: Normocephalic and atraumatic.      Mouth/Throat:      Mouth: Mucous membranes are moist.   Eyes:      General: No scleral icterus.     Extraocular Movements: Extraocular movements intact.      Conjunctiva/sclera: Conjunctivae normal.   Neck:      Vascular: No JVD.   Cardiovascular:      Rate and Rhythm: Normal rate and regular rhythm.      Heart sounds: No murmur heard.  Pulmonary:      Effort: Pulmonary effort is normal.      Breath sounds: Decreased breath sounds present. No wheezing or rhonchi.   Abdominal:      General: Bowel sounds are normal. There is no distension.      Palpations: Abdomen is soft.      Tenderness: There is no abdominal " tenderness.   Musculoskeletal:         General: No swelling or deformity.      Cervical back: Neck supple.   Lymphadenopathy:      Head:      Right side of head: No submandibular adenopathy.      Left side of head: No submandibular adenopathy.      Cervical: No cervical adenopathy.      Upper Body:      Right upper body: No supraclavicular or axillary adenopathy.      Left upper body: No supraclavicular or axillary adenopathy.      Lower Body: No right inguinal adenopathy. No left inguinal adenopathy.   Skin:     General: Skin is warm.      Coloration: Skin is not jaundiced.      Findings: No rash.   Neurological:      General: No focal deficit present.      Mental Status: He is alert and oriented to person, place, and time.      Cranial Nerves: Cranial nerves 2-12 are intact.   Psychiatric:         Attention and Perception: Attention normal.         Behavior: Behavior is cooperative.       ECOG SCORE             Laboratory:  CBC with Differential:  Lab Results   Component Value Date    WBC 3.65 (L) 09/15/2023    RBC 3.20 (L) 09/15/2023    HGB 10.4 (L) 09/15/2023    HCT 34.2 (L) 09/15/2023    .9 (H) 09/15/2023    MCH 32.5 (H) 09/15/2023    MCHC 30.4 (L) 09/15/2023    RDW 15.8 09/15/2023     09/15/2023    MPV 9.2 09/15/2023        CMP:  Sodium Level   Date Value Ref Range Status   09/15/2023 142 136 - 145 mmol/L Final     Potassium Level   Date Value Ref Range Status   09/15/2023 4.2 3.5 - 5.1 mmol/L Final     Carbon Dioxide   Date Value Ref Range Status   09/15/2023 28 23 - 31 mmol/L Final     Blood Urea Nitrogen   Date Value Ref Range Status   09/15/2023 9.2 8.4 - 25.7 mg/dL Final     Creatinine   Date Value Ref Range Status   09/15/2023 0.94 0.73 - 1.18 mg/dL Final     Calcium Level Total   Date Value Ref Range Status   09/15/2023 9.1 8.8 - 10.0 mg/dL Final     Albumin Level   Date Value Ref Range Status   09/15/2023 3.3 (L) 3.4 - 4.8 g/dL Final     Bilirubin Total   Date Value Ref Range Status    09/15/2023 0.8 <=1.5 mg/dL Final     Alkaline Phosphatase   Date Value Ref Range Status   09/15/2023 73 40 - 150 unit/L Final     Aspartate Aminotransferase   Date Value Ref Range Status   09/15/2023 5 5 - 34 unit/L Final     Alanine Aminotransferase   Date Value Ref Range Status   09/15/2023 <5 0 - 55 unit/L Final         Assessment:         1) cT4N2?Mx- Stage III Squamous cell carcinoma of the right upper lobe  -Large hilar mass that extends to the RUL  -Completed weekly XRT + Carbo/Taxol on 7/12/23     2) Liver hypodensities--> suggesting cysts        Plan:       Plan: Carbo/Taxol weekly with RT--> surgical re evaluation  Completed weekly XRT + Carbo/Taxol on 7/12/23   Surgical evaluation ongoing    Will plan to begin maintenance immunotherapy (Durvalumab) x year - okay to administer peripherally  Patient education  RTC in 2 weeks with MD for TD/labs/infusion same day - he lives in [place] - pt requests mid morning or afternoon appts - date may change if he does not have surgery  Labs: CBC, CMP, TSH  Will discuss with Dr Chand personally to see if he can use CT scan ordered by me since insurance denied CTA ordered by him  Continue follow ups with Dr. Hoff    The patient was seen, interviewed and examined. Pertinent lab and radiology studies were reviewed.   The patient was given ample opportunity to ask questions, and to the best of my abilities, all questions answered to satisfaction; patient demonstrated understanding of what we discussed and agreeable to the plan. Pt instructed to call should develop concerning signs/symptoms or have further questions.     Letha Silver MD  Hematology/Oncology      Professional Services   I, Jenelle Waters LPN, acted solely as a scribe for and in the presence of Dr. Letha Silver, who performed these services.

## 2023-09-21 RX ORDER — EPINEPHRINE 0.3 MG/.3ML
0.3 INJECTION SUBCUTANEOUS ONCE AS NEEDED
Status: CANCELLED | OUTPATIENT
Start: 2023-10-05

## 2023-09-21 RX ORDER — HEPARIN 100 UNIT/ML
500 SYRINGE INTRAVENOUS
Status: CANCELLED | OUTPATIENT
Start: 2023-10-05

## 2023-09-21 RX ORDER — SODIUM CHLORIDE 0.9 % (FLUSH) 0.9 %
10 SYRINGE (ML) INJECTION
Status: CANCELLED | OUTPATIENT
Start: 2023-10-05

## 2023-09-21 RX ORDER — DIPHENHYDRAMINE HYDROCHLORIDE 50 MG/ML
50 INJECTION INTRAMUSCULAR; INTRAVENOUS ONCE AS NEEDED
Status: CANCELLED | OUTPATIENT
Start: 2023-10-05

## 2023-09-27 ENCOUNTER — TELEPHONE (OUTPATIENT)
Dept: HEMATOLOGY/ONCOLOGY | Facility: CLINIC | Age: 68
End: 2023-09-27
Payer: MEDICARE

## 2023-09-27 ENCOUNTER — PATIENT MESSAGE (OUTPATIENT)
Dept: HEMATOLOGY/ONCOLOGY | Facility: CLINIC | Age: 68
End: 2023-09-27
Payer: MEDICARE

## 2023-09-27 NOTE — TELEPHONE ENCOUNTER
Called pt to speak about nutrition and upcoming tx. Pt wife answered and stated they will have to cancel virtual education appt today due to a death in the family. There was some confusion about pt's appts next week. I advised pt wife that I would discuss with care team here and someone will be in touch about pt appts. She verbalized understanding. I spoke with RUTH ANN Torrez who will touch base with pt/wife about rescheduling.

## 2023-10-04 ENCOUNTER — PATIENT MESSAGE (OUTPATIENT)
Dept: HEMATOLOGY/ONCOLOGY | Facility: CLINIC | Age: 68
End: 2023-10-04
Payer: MEDICARE

## 2023-10-09 ENCOUNTER — PATIENT MESSAGE (OUTPATIENT)
Dept: HEMATOLOGY/ONCOLOGY | Facility: CLINIC | Age: 68
End: 2023-10-09
Payer: MEDICARE

## 2023-10-11 ENCOUNTER — OFFICE VISIT (OUTPATIENT)
Dept: HEMATOLOGY/ONCOLOGY | Facility: CLINIC | Age: 68
End: 2023-10-11
Payer: MEDICARE

## 2023-10-11 ENCOUNTER — CLINICAL SUPPORT (OUTPATIENT)
Dept: HEMATOLOGY/ONCOLOGY | Facility: CLINIC | Age: 68
End: 2023-10-11
Payer: MEDICARE

## 2023-10-11 ENCOUNTER — DOCUMENTATION ONLY (OUTPATIENT)
Dept: HEMATOLOGY/ONCOLOGY | Facility: CLINIC | Age: 68
End: 2023-10-11
Payer: MEDICARE

## 2023-10-11 DIAGNOSIS — C34.01 LUNG CANCER, MAIN BRONCHUS, RIGHT: Primary | ICD-10-CM

## 2023-10-11 PROCEDURE — 98967 PR NONPHYSICIAN TELEPHONE ASSESSMENT 11-20 MIN: ICD-10-PCS | Mod: 95,,,

## 2023-10-11 PROCEDURE — 98967 PH1 ASSMT&MGMT NQHP 11-20: CPT | Mod: 95,,,

## 2023-10-11 PROCEDURE — 3288F PR FALLS RISK ASSESSMENT DOCUMENTED: ICD-10-PCS | Mod: CPTII,95,,

## 2023-10-11 PROCEDURE — 3044F PR MOST RECENT HEMOGLOBIN A1C LEVEL <7.0%: ICD-10-PCS | Mod: CPTII,95,,

## 2023-10-11 PROCEDURE — 3288F FALL RISK ASSESSMENT DOCD: CPT | Mod: CPTII,95,,

## 2023-10-11 PROCEDURE — 1159F PR MEDICATION LIST DOCUMENTED IN MEDICAL RECORD: ICD-10-PCS | Mod: CPTII,95,,

## 2023-10-11 PROCEDURE — 1159F MED LIST DOCD IN RCRD: CPT | Mod: CPTII,95,,

## 2023-10-11 PROCEDURE — 4010F ACE/ARB THERAPY RXD/TAKEN: CPT | Mod: CPTII,95,,

## 2023-10-11 PROCEDURE — 1101F PT FALLS ASSESS-DOCD LE1/YR: CPT | Mod: CPTII,95,,

## 2023-10-11 PROCEDURE — 4010F PR ACE/ARB THEARPY RXD/TAKEN: ICD-10-PCS | Mod: CPTII,95,,

## 2023-10-11 PROCEDURE — 1101F PR PT FALLS ASSESS DOC 0-1 FALLS W/OUT INJ PAST YR: ICD-10-PCS | Mod: CPTII,95,,

## 2023-10-11 PROCEDURE — 3044F HG A1C LEVEL LT 7.0%: CPT | Mod: CPTII,95,,

## 2023-10-11 NOTE — PROGRESS NOTES
Oncology Nutrition Evaluation      Deondre Ocampo IV   1955    Oncology Provider:   Letha Silver MD    Reason for Visit:  New Treatment Education    Oncology/Hematology Diagnosis:   T4N2?Mx- Stage III Squamous cell carcinoma of the right upper lobe    Treatment Plan:  Carbo/Taxol + RT completed 2023, now starting durvalumab    Nutrition Recommendations:  1. Regular diet as tolerated    Nutrition Assessment    10/11/23: This is a 68 y.o.male with a medical diagnosis of lung CA. He reports good appetite and po intake. He notes he did have ~15# wt loss earlier this year but has gained most of it back. No c/o n/v/c/d. He is tolerating a regular diet.    Nutrition Factors Affecting Intake  none identified    PMHx: DM, high chol, HTN    Allergies: Ace inhibitors and Penicillins    Current Medications:    Current Outpatient Medications:     aspirin (ECOTRIN) 81 MG EC tablet, Take 81 mg by mouth once daily., Disp: , Rfl:     dicyclomine (BENTYL) 20 mg tablet, Take 20 mg by mouth once daily. Once daily in the AM., Disp: , Rfl:     glimepiride (AMARYL) 2 MG tablet, Take 2 mg by mouth before breakfast., Disp: , Rfl:     losartan (COZAAR) 100 MG tablet, Take 100 mg by mouth once daily., Disp: , Rfl:     metoprolol tartrate (LOPRESSOR) 50 MG tablet, Take 50 mg by mouth 2 (two) times daily., Disp: , Rfl:     oxyCODONE-acetaminophen (PERCOCET)  mg per tablet, Take 1 tablet by mouth every 4 (four) hours as needed. for pain., Disp: , Rfl:     pantoprazole (PROTONIX) 40 MG tablet, Take 40 mg by mouth., Disp: , Rfl:     pravastatin (PRAVACHOL) 20 MG tablet, Take 20 mg by mouth once daily., Disp: , Rfl:     TRULICITY 1.5 mg/0.5 mL pen injector, SMARTSI pre-filled pen syringe SUB-Q Once a Week, Disp: , Rfl:     water Liqd 150 mL with MILK OF MAGNESIA 400 mg/5 mL Susp 400 mg, diphenhydrAMINE 12.5 mg/5 mL Elix 60 mg, nystatin 100,000 unit/mL Susp 500,000 Units, Take 5 mLs by mouth., Disp: , Rfl:     Labs: no  "new    Anthropometrics    Height:   Ht Readings from Last 1 Encounters:   09/20/23 6' 1" (1.854 m)      Weight:   Wt Readings from Last 1 Encounters:   09/20/23 94.9 kg (209 lb 3.2 oz)        Usual Body Weight: 97.7 kg (215 lb)  % Weight Change: -6.9% in 3-6 months, has gained back 9#.    BMI: 27.6 (overweight)    Ideal Weight: 83.6 kg (184 lb)  % Ideal Weight: 114%      Nutrition Diagnosis    No nutrition diagnosis at this time.    Nutrition Risk  low    Nutrition Intervention    Interventions(treatment strategy):  general/healthful diet      Nutrition Monitoring and Evaluation    Ongoing monitoring not warranted at this time. Please consult RD prn.        Natasha Rivera MS, RD, , LDN                                                                                                                                                                                                                                                                                  "

## 2023-10-11 NOTE — PROGRESS NOTES
Established Patient - Audio Only Telehealth Visit     The patient location is: at home  The chief complaint leading to consultation is: patient education  Visit type: Virtual visit with audio only (telephone)  Total time spent with patient: 15 minutes       The reason for the audio only service rather than synchronous audio and video virtual visit was related to technical difficulties or patient preference/necessity.     Each patient to whom I provide medical services by telemedicine is:  (1) informed of the relationship between the physician and patient and the respective role of any other health care provider with respect to management of the patient; and (2) notified that they may decline to receive medical services by telemedicine and may withdraw from such care at any time. Patient verbally consented to receive this service via voice-only telephone call.       HPI: patient education     Assessment and plan:  patient education/ assessment                        This service was not originating from a related E/M service provided within the previous 7 days nor will  to an E/M service or procedure within the next 24 hours or my soonest available appointment.  Prevailing standard of care was able to be met in this audio-only visit.

## 2023-10-11 NOTE — NURSING
Joaquim and I spoke with Deondre. Deondre's wife was on the call.  I explained my role as a  and reminded Deondre that he spoke with Joaquim in May. I asked Deondre about his wellbeing and he stated he is feeling ok. I asked him if he had any questions and he denied. I encouraged Deondre to reach out if he ever needs.   Sayra Peacock, MSW Intern    TER[facility] HarveyW

## 2023-10-11 NOTE — PROGRESS NOTES
THERAPY EDUCATION: DURVALUMAB       Established Patient - Audio Only Telehealth Visit      The patient location is: Home  The chief complaint leading to consultation is: Therapy Education   Visit type: Virtual visit with audio only (telephone)  Total time spent with patient: 20 minutes         The reason for the audio only service rather than synchronous audio and video virtual visit was related to technical difficulties or patient preference/necessity.     Each patient to whom I provide medical services by telemedicine is:  (1) informed of the relationship between the physician and patient and the respective role of any other health care provider with respect to management of the patient; and (2) notified that they may decline to receive medical services by telemedicine and may withdraw from such care at any time. Patient verbally consented to receive this service via voice-only telephone call.     This service was not originating from a related E/M service provided within the previous 7 days nor will  to an E/M service or procedure within the next 24 hours or my soonest available appointment.  Prevailing standard of care was able to be met in this audio-only visit.           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    Treatment/Oncology history:  Carbo/Taxol + RT completed 7/12/2023    Imaging:  PET-CT 03/10/2023:  Hypermetabolic mass in the right supra hilar region measuring 4.7 x 2.9 cm SUV of 20.8.  The mass abuts the right main pulmonary artery and right main stem bronchus.  Subcentimeter mediastinal lymph node noted.  One of LN slightly hypermetabolic with SUV of 3 and is located laterally.  Patchy hypermetabolism within the liver as suspected, however, there are few tiny focal areas of hypermetabolism out of proportion to the rest of the liver.  At least 1 of this corresponds to a low-density focus seen in  the noncontrast exam.  CT 2023: changes consistent with COPD and emphysema in the lungs bilaterally with multiple bulla bleb seen. mass seen in the right hilum extending into the right upper lobe.  It is somewhat infiltrative.  This was seen on the prior PET-CT as well.  The findings are not significantly changed.  There are multiple associated satellite nodules in the right upper lobe.  Rough dimensions of the hilar component of the mass is 5.4 x 5.1 cm and rough measurements of the dominant lesion in the right upper lobe is 11 cm x 3.6 cm.  The mass extends into the right mainstem and right upper lobe bronchus.  This was seen on the prior examination as well.  There is narrowing of the right upper lobe bronchus there is some narrowing of the right upper lobe pulmonary arteries.  Findings are similar to the prior examination.  Scattered areas of punctate subcentimeter nodularity is seen in the right lower lobe.  These are too small to characterize and similar changes were seen previously.    No pleural effusion is seen.  Thoracic aorta is normal in caliber.  There is some mediastinal lymphadenopathy seen.  Reference lymph node is measured in the precarinal region on image number 39 series 2 at 1.5 x 0.9 cm.  This is similar to the prior examination.  Scattered punctate areas of hypoattenuation are seen throughout the liver.  Similar changes were seen previously.  The lesions are too small to characterize but may represent cysts.  The largest lesion is seen in segment 4.  It does not enhance and measures 2 cm x 1.5 cm.  Findings may represent a cyst.  No adrenal nodules are seen.  CT Head  @ [facility] [place] [facility]. Mild chronic white matter changes. Old left caudate lobe lesion. 2. No evidence of metastatic disease.  CT C/A/P 9/15/2023:   1. Interval resolution of previously seen endobronchial right hilar mass.  2. Improved aeration of the right upper lobe.  There are residual but improved patchy irregular  opacities in the right upper lobe, predominantly peripherally located  3. Interval improvement of mediastinal lymph nodes  4. Unchanged hepatic cyst and too small to characterize hepatic hypodensities     Pathology:  02/10/2023:  Right upper lobe lung needle biopsy: Scattered highly atypical squamous cells, suspicious for squamous cell carcinoma in a background of abundant acute inflammation and necrosis  Right upper lobe, brushing:  Positive for malignant cells.  Squamous cell carcinoma, moderately differentiated.  Right upper lobe biopsy squamous cell carcinoma, moderately differentiated with focal keratinization.  Right lung washings positive for malignant cells.  Squamous cell carcinoma in a background of abundant necrosis.      CLINICAL HISTORY:       Patient: Deondre Ocampo IV is a 68 y.o. male.  Kindly referred for newly diagnosed squamous cell carcinoma of the lung.    He reports that he was being evaluated for a right side pain went imaging studies showed a mass in the lung.  I do not have the CTs.  But he eventually underwent bronchoscopy that showed squamous cell carcinoma.  PET-CT showed the right suprahilar mass with possible mediastinal lymphadenopathy and questionable liver lesions.  He is here to discuss further recommendations.    He is here with his wife.  Patient has history of 53 pack year smoking.  He reports that his breathing is fine.  He denies any chest pain, short of breath or coughing.  No hemoptysis.  He has an appointment to see the radiation oncologist next week.    Chief Complaint: Therapy Education       Interval History:  10/11/23: Mr. Mendosa presents to the clinic via an audio visit for therapy education. Patient reports no major complaints at today's visit. Denies fever, chills, SOB, N/V/D, constipation, recent infection, chest pain or unexplained bleeding or bruising.      9/20/23:Patient presents today for follow up to discuss CT scan results, he is with his wife. He completed  6 cycle of weekly Carbo/Taxol on 7/12/23 along with XRT.  He is doing well. Denies fever, chills, sweats. No chest pain or shortness of breath. No bleeding. He was seen by Dr. Chand on 8/23/23,  CTA and ventilation perfusion scan were ordered to see if he would be a candidate for pneumonectomy. Ventilation perfusion scan has been completed, but CTA was denied by insurance and he is awaiting follow up with Dr. Chand to discuss results and the plan.        Past Medical History:   Diagnosis Date    Diabetes mellitus     High cholesterol     Hypertension     Lung cancer       Past Surgical History:   Procedure Laterality Date    BACK SURGERY      DEBRIDEMENT TENNIS ELBOW Right     PERIPHERALLY INSERTED CENTRAL CATHETER INSERTION Left 5/29/2023    Procedure: Insertion-picc;  Surgeon: Letha Silver MD;  Location: [facility];  Service: Oncology;  Laterality: Left;    right arm       Family History   Problem Relation Age of Onset    Coronary artery disease Mother     No Known Problems Father     Coronary artery disease Brother     Cancer Brother     Diabetes Brother     Heart failure Brother     Diabetes Brother     Leukemia Brother     Leukemia Daughter      Social Connections: Not on file       Review of patient's allergies indicates:   Allergen Reactions    Ace inhibitors Swelling    Penicillins Itching and Rash      Current Outpatient Medications on File Prior to Visit   Medication Sig Dispense Refill    aspirin (ECOTRIN) 81 MG EC tablet Take 81 mg by mouth once daily.      dicyclomine (BENTYL) 20 mg tablet Take 20 mg by mouth once daily. Once daily in the AM.      glimepiride (AMARYL) 2 MG tablet Take 2 mg by mouth before breakfast.      losartan (COZAAR) 100 MG tablet Take 100 mg by mouth once daily.      metoprolol tartrate (LOPRESSOR) 50 MG tablet Take 50 mg by mouth 2 (two) times daily.      oxyCODONE-acetaminophen (PERCOCET)  mg per tablet Take 1 tablet by mouth every 4 (four) hours as needed. for pain.       pantoprazole (PROTONIX) 40 MG tablet Take 40 mg by mouth.      pravastatin (PRAVACHOL) 20 MG tablet Take 20 mg by mouth once daily.      TRULICITY 1.5 mg/0.5 mL pen injector SMARTSI pre-filled pen syringe SUB-Q Once a Week      water Liqd 150 mL with MILK OF MAGNESIA 400 mg/5 mL Susp 400 mg, diphenhydrAMINE 12.5 mg/5 mL Elix 60 mg, nystatin 100,000 unit/mL Susp 500,000 Units Take 5 mLs by mouth.       No current facility-administered medications on file prior to visit.      Review of Systems   Constitutional:  Negative for activity change, appetite change, chills, diaphoresis, fatigue, fever and unexpected weight change.   HENT:  Negative for nasal congestion, mouth sores, nosebleeds, sinus pressure/congestion, sore throat and trouble swallowing.    Eyes: Negative.    Respiratory:  Negative for cough and shortness of breath.    Cardiovascular:  Negative for chest pain and palpitations.   Gastrointestinal:  Negative for abdominal distention, abdominal pain, blood in stool, change in bowel habit, constipation, diarrhea, nausea and vomiting.   Endocrine: Negative.    Genitourinary:  Negative for bladder incontinence, decreased urine volume, difficulty urinating, dysuria, frequency, hematuria and urgency.   Musculoskeletal:  Positive for back pain and neck pain. Negative for arthralgias, gait problem, joint swelling, leg pain and myalgias.        Chronic   Integumentary:  Negative for rash.   Allergic/Immunologic: Negative.    Neurological:  Negative for dizziness, tremors, syncope, weakness, light-headedness, numbness, headaches and memory loss.   Hematological:  Negative for adenopathy. Does not bruise/bleed easily.   Psychiatric/Behavioral:  Negative for agitation, confusion, hallucinations, sleep disturbance and suicidal ideas. The patient is not nervous/anxious.        There were no vitals filed for this visit.        Wt Readings from Last 6 Encounters:   23 94.9 kg (209 lb 3.2 oz)   23 92.5 kg  (204 lb)   08/23/23 92.3 kg (203 lb 6.4 oz)   07/26/23 95.2 kg (209 lb 12.8 oz)   06/30/23 96.7 kg (213 lb 1.6 oz)   06/12/23 96.8 kg (213 lb 8 oz)        There is no height or weight on file to calculate BMI.  There is no height or weight on file to calculate BSA.          Laboratory:  CBC with Differential:  Lab Results   Component Value Date    WBC 3.65 (L) 09/15/2023    RBC 3.20 (L) 09/15/2023    HGB 10.4 (L) 09/15/2023    HCT 34.2 (L) 09/15/2023    .9 (H) 09/15/2023    MCH 32.5 (H) 09/15/2023    MCHC 30.4 (L) 09/15/2023    RDW 15.8 09/15/2023     09/15/2023    MPV 9.2 09/15/2023        CMP:  Sodium Level   Date Value Ref Range Status   09/15/2023 142 136 - 145 mmol/L Final     Potassium Level   Date Value Ref Range Status   09/15/2023 4.2 3.5 - 5.1 mmol/L Final     Carbon Dioxide   Date Value Ref Range Status   09/15/2023 28 23 - 31 mmol/L Final     Blood Urea Nitrogen   Date Value Ref Range Status   09/15/2023 9.2 8.4 - 25.7 mg/dL Final     Creatinine   Date Value Ref Range Status   09/15/2023 0.94 0.73 - 1.18 mg/dL Final     Calcium Level Total   Date Value Ref Range Status   09/15/2023 9.1 8.8 - 10.0 mg/dL Final     Albumin Level   Date Value Ref Range Status   09/15/2023 3.3 (L) 3.4 - 4.8 g/dL Final     Bilirubin Total   Date Value Ref Range Status   09/15/2023 0.8 <=1.5 mg/dL Final     Alkaline Phosphatase   Date Value Ref Range Status   09/15/2023 73 40 - 150 unit/L Final     Aspartate Aminotransferase   Date Value Ref Range Status   09/15/2023 5 5 - 34 unit/L Final     Alanine Aminotransferase   Date Value Ref Range Status   09/15/2023 <5 0 - 55 unit/L Final        Assessment:   1) [id number]- Stage 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  -Plans to start Durvalumab on 10/12/23.  -OTC Imodium AD recommended for diarrhea (4-6 BMs a day). Take 2 tablets after the first loose bowel movement, and 1 tablet after  each loose bowel movement after the first dose has been taken. No more than 4 tablets should be taken in any 24-hour period. If not working, Lomotil can be prescribed as 2nd choice.    -Emphasized adequate hand-hygiene and limited contact with people who are sick.   -Monitor and notify any bleeding in urine, stool, or sputum.  As well as unusual bleeding or bruising and stomach pain.   -Emphasized hydration with 4 16 oz bottles of water a day.    -Importance of moisturizing with fragrance free lotion to prevent skin rash.  -Call clinic if fever >100.4, shakes or chills, shortness of breath, chest pain, uncontrolled vomiting or diarrhea, pain and tingling in the chest or arm, or just not feeling well.    -Plans to RTC in 2 weeks with MD for TD/labs/infusion same day.    Discussion:    ·       A total of 60 minutes were spent in counseling today of which 100% was face-to-face.   At today's teaching session we discussed the patient's cancer diagnosis as well as planned immunotherapy regimen, protocol, side effects and toxicities.  Handouts of each agent was given and reviewed.    ·       We reviewed that side effects and toxicities typically occur after 4-10 weeks of treatment, and include but are not limited to:    Durvalumab Side Effects:  Allergic Reactions  Bone Pain  Breathing Problems  Chest Pain  Chills  Confusion  Constipation  Cough  Diarrhea  Dizziness  Dry Mouth  Dry Skin  Fever  Flushing  Headache  Injury  Itching  Muscle Pain  Nausea  Pain  Rash  Swelling  Weight Gain  Weight Loss    ·       Discussed the risk of immune-mediated pneumonitis, including interstitial lung disease.  Instructed to contact our office for new or worsening chest pain, severe cough or shortness of breath.    ·       Discussed the risk of immune-mediated colitis and diarrhea. Instructed to contact our office for diarrhea she cannot control or that results in =4 bowel movements per day.    ·       Discussed the risk of immune  mediated hepatitis. Instructed to contact office for right upper quadrant pain, yellowing of skin or eyes, concentrated yellow urine, severe nausea and vomiting, or severe abdominal pain.    ·       Discussed the risk of immune-mediated nephritis. Instructed patient on possible symptoms of decreased urine output, blood in the urine or very dark urine, overall swelling, or very high blood pressure.    ·       Discussed the risk of immune-mediated cardiomyopathy, including symptoms of edema, including periorbital edema, generalized edema, peripheral edema, and pulmonary edema.    ·       Discussed the risk of immune-mediated adrenal insufficiency or hypophysitis, including symptoms of headache, visual disturbances, fatigue, weight loss, and hypotension.    ·       Discussed immune-mediated thyroiditis, which can result in either hypothyroid or hyperthyroid. These will be managed according to symptoms, without a dose adjustment needed.    ·       Discussed the risk of rashes including an immune-mediated rash, Agosto-Chencho syndrome (SJS), and toxic epidermal necrolysis (TENS). Also possible are vitiligo/skin hypopigmentation.  Instructed to contact our office for any new onset moderate to severe rash.    ·       Discussed the risk of immune-mediated encephalitis, including symptoms of altered mental status, headache, fever, confusion, agitation or hallucinations, seizures, loss of sensation or paralysis, muscle weakness, double vision, problems with speech or hearing, or loss on consciousness.    ·       Discussed musculoskeletal side effects of muscle and joint pain.    ·       Discussed respiratory side effects of cough and dyspnea, increased risk of upper respiratory infections.    ·       Discussed GI side effects of nausea and vomiting, and to a lesser extent abdominal pain, decreased appetite, wt loss, and constipation.    ·       Discussed possible side effects of edema, including periorbital edema,  generalized edema, peripheral edema, and pulmonary edema.    ·       Discussed general side effects of fatigue and fever.    ·       Discussed possible electrolyte abnormalities.    ·       Discussed possible hematologic toxicities.    ·       Instructed to contact our office for discussion of medication changes, vaccination, the addition of vitamin and/or herbal supplementation as they may interact with some immunotherapy agents.    ·       Discussed dietary modifications and the need to maintain adequate caloric intake and proper oral hydration.  Recommended at least 64-80 oz of fluid per day.    ·       Discussed anti-emetic protocol and bowel regimen protocol.    ·       Office contact information given including after hours number.  Discussed there is an oncologist on call 24/7, 365 days, including weekends.  Provided phone numbers to use as necessary during business, Monday through Friday.    ·       A tour of our infusion room was given.    ·       In summary, the patient is in agreement with the treatment plan.  Questions appeared to be answered to their satisfaction. Consented the patient to the treatment plan and the patient was educated on the planned duration of the treatment and schedule of the treatment administration. Copy scanned into the chart.    All questions answered to the satisfaction of the patient and family.     Follow up appointments given to patient.      LEONOR HA  PATIENT EDUCATOR  [facility]

## 2023-10-18 ENCOUNTER — PATIENT MESSAGE (OUTPATIENT)
Dept: HEMATOLOGY/ONCOLOGY | Facility: CLINIC | Age: 68
End: 2023-10-18
Payer: MEDICARE

## 2023-11-02 ENCOUNTER — PATIENT MESSAGE (OUTPATIENT)
Dept: HEMATOLOGY/ONCOLOGY | Facility: CLINIC | Age: 68
End: 2023-11-02
Payer: MEDICARE

## 2023-11-15 ENCOUNTER — INFUSION (OUTPATIENT)
Dept: INFUSION THERAPY | Facility: HOSPITAL | Age: 68
End: 2023-11-15
Attending: INTERNAL MEDICINE
Payer: MEDICARE

## 2023-11-15 ENCOUNTER — OFFICE VISIT (OUTPATIENT)
Dept: HEMATOLOGY/ONCOLOGY | Facility: CLINIC | Age: 68
End: 2023-11-15
Payer: MEDICARE

## 2023-11-15 VITALS
SYSTOLIC BLOOD PRESSURE: 141 MMHG | OXYGEN SATURATION: 98 % | RESPIRATION RATE: 18 BRPM | HEIGHT: 73 IN | DIASTOLIC BLOOD PRESSURE: 87 MMHG | HEART RATE: 62 BPM | TEMPERATURE: 98 F | BODY MASS INDEX: 27.17 KG/M2 | WEIGHT: 205 LBS

## 2023-11-15 DIAGNOSIS — C34.01 LUNG CANCER, MAIN BRONCHUS, RIGHT: ICD-10-CM

## 2023-11-15 DIAGNOSIS — C34.91 SQUAMOUS CELL CARCINOMA OF RIGHT LUNG: ICD-10-CM

## 2023-11-15 DIAGNOSIS — R53.83 FATIGUE, UNSPECIFIED TYPE: Primary | ICD-10-CM

## 2023-11-15 DIAGNOSIS — C34.91 SQUAMOUS CELL CARCINOMA OF RIGHT LUNG: Primary | ICD-10-CM

## 2023-11-15 PROCEDURE — 1159F MED LIST DOCD IN RCRD: CPT | Mod: CPTII,S$GLB,, | Performed by: INTERNAL MEDICINE

## 2023-11-15 PROCEDURE — 1159F PR MEDICATION LIST DOCUMENTED IN MEDICAL RECORD: ICD-10-PCS | Mod: CPTII,S$GLB,, | Performed by: INTERNAL MEDICINE

## 2023-11-15 PROCEDURE — 3044F HG A1C LEVEL LT 7.0%: CPT | Mod: CPTII,S$GLB,, | Performed by: INTERNAL MEDICINE

## 2023-11-15 PROCEDURE — 3288F PR FALLS RISK ASSESSMENT DOCUMENTED: ICD-10-PCS | Mod: CPTII,S$GLB,, | Performed by: INTERNAL MEDICINE

## 2023-11-15 PROCEDURE — 3008F BODY MASS INDEX DOCD: CPT | Mod: CPTII,S$GLB,, | Performed by: INTERNAL MEDICINE

## 2023-11-15 PROCEDURE — 3288F FALL RISK ASSESSMENT DOCD: CPT | Mod: CPTII,S$GLB,, | Performed by: INTERNAL MEDICINE

## 2023-11-15 PROCEDURE — 99215 PR OFFICE/OUTPT VISIT, EST, LEVL V, 40-54 MIN: ICD-10-PCS | Mod: S$GLB,,, | Performed by: INTERNAL MEDICINE

## 2023-11-15 PROCEDURE — 4010F ACE/ARB THERAPY RXD/TAKEN: CPT | Mod: CPTII,S$GLB,, | Performed by: INTERNAL MEDICINE

## 2023-11-15 PROCEDURE — 3077F PR MOST RECENT SYSTOLIC BLOOD PRESSURE >= 140 MM HG: ICD-10-PCS | Mod: CPTII,S$GLB,, | Performed by: INTERNAL MEDICINE

## 2023-11-15 PROCEDURE — 3079F PR MOST RECENT DIASTOLIC BLOOD PRESSURE 80-89 MM HG: ICD-10-PCS | Mod: CPTII,S$GLB,, | Performed by: INTERNAL MEDICINE

## 2023-11-15 PROCEDURE — 99215 OFFICE O/P EST HI 40 MIN: CPT | Mod: S$GLB,,, | Performed by: INTERNAL MEDICINE

## 2023-11-15 PROCEDURE — 4010F PR ACE/ARB THEARPY RXD/TAKEN: ICD-10-PCS | Mod: CPTII,S$GLB,, | Performed by: INTERNAL MEDICINE

## 2023-11-15 PROCEDURE — 3079F DIAST BP 80-89 MM HG: CPT | Mod: CPTII,S$GLB,, | Performed by: INTERNAL MEDICINE

## 2023-11-15 PROCEDURE — 25000003 PHARM REV CODE 250: Performed by: INTERNAL MEDICINE

## 2023-11-15 PROCEDURE — 3008F PR BODY MASS INDEX (BMI) DOCUMENTED: ICD-10-PCS | Mod: CPTII,S$GLB,, | Performed by: INTERNAL MEDICINE

## 2023-11-15 PROCEDURE — 1101F PT FALLS ASSESS-DOCD LE1/YR: CPT | Mod: CPTII,S$GLB,, | Performed by: INTERNAL MEDICINE

## 2023-11-15 PROCEDURE — 1125F PR PAIN SEVERITY QUANTIFIED, PAIN PRESENT: ICD-10-PCS | Mod: CPTII,S$GLB,, | Performed by: INTERNAL MEDICINE

## 2023-11-15 PROCEDURE — 1125F AMNT PAIN NOTED PAIN PRSNT: CPT | Mod: CPTII,S$GLB,, | Performed by: INTERNAL MEDICINE

## 2023-11-15 PROCEDURE — 63600175 PHARM REV CODE 636 W HCPCS: Mod: JZ,JG | Performed by: INTERNAL MEDICINE

## 2023-11-15 PROCEDURE — 3077F SYST BP >= 140 MM HG: CPT | Mod: CPTII,S$GLB,, | Performed by: INTERNAL MEDICINE

## 2023-11-15 PROCEDURE — 96413 CHEMO IV INFUSION 1 HR: CPT

## 2023-11-15 PROCEDURE — 1101F PR PT FALLS ASSESS DOC 0-1 FALLS W/OUT INJ PAST YR: ICD-10-PCS | Mod: CPTII,S$GLB,, | Performed by: INTERNAL MEDICINE

## 2023-11-15 PROCEDURE — 99999 PR PBB SHADOW E&M-EST. PATIENT-LVL IV: ICD-10-PCS | Mod: PBBFAC,,, | Performed by: INTERNAL MEDICINE

## 2023-11-15 PROCEDURE — 3044F PR MOST RECENT HEMOGLOBIN A1C LEVEL <7.0%: ICD-10-PCS | Mod: CPTII,S$GLB,, | Performed by: INTERNAL MEDICINE

## 2023-11-15 PROCEDURE — 99999 PR PBB SHADOW E&M-EST. PATIENT-LVL IV: CPT | Mod: PBBFAC,,, | Performed by: INTERNAL MEDICINE

## 2023-11-15 RX ORDER — DIPHENHYDRAMINE HYDROCHLORIDE 50 MG/ML
50 INJECTION INTRAMUSCULAR; INTRAVENOUS ONCE AS NEEDED
Status: DISCONTINUED | OUTPATIENT
Start: 2023-11-15 | End: 2023-11-15 | Stop reason: HOSPADM

## 2023-11-15 RX ORDER — SODIUM CHLORIDE 0.9 % (FLUSH) 0.9 %
10 SYRINGE (ML) INJECTION
Status: DISCONTINUED | OUTPATIENT
Start: 2023-11-15 | End: 2023-11-15 | Stop reason: HOSPADM

## 2023-11-15 RX ORDER — HEPARIN 100 UNIT/ML
500 SYRINGE INTRAVENOUS
Status: DISCONTINUED | OUTPATIENT
Start: 2023-11-15 | End: 2023-11-15 | Stop reason: HOSPADM

## 2023-11-15 RX ORDER — EPINEPHRINE 0.3 MG/.3ML
0.3 INJECTION SUBCUTANEOUS ONCE AS NEEDED
Status: DISCONTINUED | OUTPATIENT
Start: 2023-11-15 | End: 2023-11-15 | Stop reason: HOSPADM

## 2023-11-15 RX ADMIN — SODIUM CHLORIDE: 9 INJECTION, SOLUTION INTRAVENOUS at 02:11

## 2023-11-15 RX ADMIN — SODIUM CHLORIDE 1500 MG: 9 INJECTION, SOLUTION INTRAVENOUS at 02:11

## 2023-11-15 NOTE — PROGRESS NOTES
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  The mass abuts the right main pulmonary artery and right main stem bronchus.  Subcentimeter mediastinal lymph node noted.  One of LN slightly hypermetabolic with SUV of 3 and is located laterally.  Patchy hypermetabolism within the liver as suspected, however, there are few tiny focal areas of hypermetabolism out of proportion to the rest of the liver.  At least 1 of this corresponds to a low-density focus seen in the noncontrast exam.  CT 5/12/2023: changes consistent with COPD and emphysema in the lungs bilaterally with multiple bulla bleb seen. mass seen in the right hilum extending into the right upper lobe.  It is somewhat infiltrative.  This was seen on the prior PET-CT as well.  The findings are not significantly changed.  There are multiple associated satellite nodules in the right upper lobe.  Rough dimensions of the hilar component of the mass is 5.4 x 5.1 cm and rough measurements of the dominant lesion in the right upper lobe is 11 cm x 3.6 cm.  The mass extends into the right mainstem and right upper lobe bronchus.  This was seen on the prior examination as well.  There is narrowing of the right upper lobe bronchus there is some narrowing of the right upper lobe pulmonary arteries.  Findings are similar to the prior examination.  Scattered areas of punctate subcentimeter nodularity is seen in the right lower lobe.  These are too small to characterize and similar  changes were seen previously.    No pleural effusion is seen.  Thoracic aorta is normal in caliber.  There is some mediastinal lymphadenopathy seen.  Reference lymph node is measured in the precarinal region on image number 39 series 2 at 1.5 x 0.9 cm.  This is similar to the prior examination.  Scattered punctate areas of hypoattenuation are seen throughout the liver.  Similar changes were seen previously.  The lesions are too small to characterize but may represent cysts.  The largest lesion is seen in segment 4.  It does not enhance and measures 2 cm x 1.5 cm.  Findings may represent a cyst.  No adrenal nodules are seen.  CT Head  @ [facility] [place] [facility]. Mild chronic white matter changes. Old left caudate lobe lesion. 2. No evidence of metastatic disease.  CT C/A/P 9/15/2023:   1. Interval resolution of previously seen endobronchial right hilar mass.  2. Improved aeration of the right upper lobe.  There are residual but improved patchy irregular opacities in the right upper lobe, predominantly peripherally located  3. Interval improvement of mediastinal lymph nodes  4. Unchanged hepatic cyst and too small to characterize hepatic hypodensities     Pathology:  02/10/2023:  Right upper lobe lung needle biopsy: Scattered highly atypical squamous cells, suspicious for squamous cell carcinoma in a background of abundant acute inflammation and necrosis  Right upper lobe, brushing:  Positive for malignant cells.  Squamous cell carcinoma, moderately differentiated.  Right upper lobe biopsy squamous cell carcinoma, moderately differentiated with focal keratinization.  Right lung washings positive for malignant cells.  Squamous cell carcinoma in a background of abundant necrosis.      CLINICAL HISTORY:       Patient: Deondre Ocampo IV is a 68 y.o. male.  Kindly referred for newly diagnosed squamous cell carcinoma of the lung.    He reports that he was being evaluated for a right side pain went imaging studies showed a  mass in the lung.  I do not have the CTs.  But he eventually underwent bronchoscopy that showed squamous cell carcinoma.  PET-CT showed the right suprahilar mass with possible mediastinal lymphadenopathy and questionable liver lesions.  He is here to discuss further recommendations.    He is here with his wife.  Patient has history of 53 pack year smoking.  He reports that his breathing is fine.  He denies any chest pain, short of breath or coughing.  No hemoptysis.  He has an appointment to see the radiation oncologist next week.    Chief Complaint: OTHER (PT has questions about treatment.)      Interval History:  He completed 6 cycle of weekly Carbo/Taxol on 7/12/23 along with XRT. Restaging CT with GAURAV    Patient presents today for follow up prior to initiation of Imfinzi, due today. He is accompanied by his wife. His last visit was on 9/20/23 and he was unable to follow up as planned originally due to having car trouble and death in the family. He continues with coughing up phlegm in the morning, no worsening. He complains of fatigue, but attributes it to not moving around much since back surgery years ago. Otherwise, he is doing well. Denies fever, chills, sweats. No chest pain or shortness of breath. No bleeding.  He is going to [place] for the Thanksgiving holiday to be with family.      Past Medical History:   Diagnosis Date    Diabetes mellitus     High cholesterol     Hypertension     Lung cancer       Past Surgical History:   Procedure Laterality Date    BACK SURGERY      DEBRIDEMENT TENNIS ELBOW Right     PERIPHERALLY INSERTED CENTRAL CATHETER INSERTION Left 5/29/2023    Procedure: Insertion-picc;  Surgeon: Letha Silver MD;  Location: [facility];  Service: Oncology;  Laterality: Left;    right arm       Family History   Problem Relation Age of Onset    Coronary artery disease Mother     No Known Problems Father     Coronary artery disease Brother     Cancer Brother     Diabetes Brother     Heart  failure Brother     Diabetes Brother     Leukemia Brother     Leukemia Daughter      Social Connections: Not on file       Review of patient's allergies indicates:   Allergen Reactions    Ace inhibitors Swelling     Other reaction(s): Angioedema, Respiratory distress    Penicillins Itching and Rash      Current Outpatient Medications on File Prior to Visit   Medication Sig Dispense Refill    aspirin (ECOTRIN) 81 MG EC tablet Take 81 mg by mouth once daily.      dicyclomine (BENTYL) 20 mg tablet Take 20 mg by mouth once daily. Once daily in the AM.      glimepiride (AMARYL) 2 MG tablet Take 2 mg by mouth before breakfast.      losartan (COZAAR) 100 MG tablet Take 100 mg by mouth once daily.      metoprolol tartrate (LOPRESSOR) 50 MG tablet Take 50 mg by mouth 2 (two) times daily.      oxyCODONE-acetaminophen (PERCOCET)  mg per tablet Take 1 tablet by mouth every 4 (four) hours as needed. for pain.      pantoprazole (PROTONIX) 40 MG tablet Take 40 mg by mouth.      pravastatin (PRAVACHOL) 20 MG tablet Take 20 mg by mouth once daily.      TRULICITY 1.5 mg/0.5 mL pen injector SMARTSI pre-filled pen syringe SUB-Q Once a Week      water Liqd 150 mL with MILK OF MAGNESIA 400 mg/5 mL Susp 400 mg, diphenhydrAMINE 12.5 mg/5 mL Elix 60 mg, nystatin 100,000 unit/mL Susp 500,000 Units Take 5 mLs by mouth.       Current Facility-Administered Medications on File Prior to Visit   Medication Dose Route Frequency Provider Last Rate Last Admin    alteplase injection 2 mg  2 mg Intra-Catheter PRN Letha Silver MD        diphenhydrAMINE injection 50 mg  50 mg Intravenous Once PRN Lteha Silver MD        durvalumab (IMFINZI) 1,500 mg in sodium chloride 0.9% SolP 315 mL chemo infusion  1,500 mg Intravenous 1 time in Clinic/HOD Letha Silver  mL/hr at 11/15/23 1419 1,500 mg at 11/15/23 1419    EPINEPHrine (EPIPEN) 0.3 mg/0.3 mL pen injection 0.3 mg  0.3 mg Intramuscular Once PRN Letha Silver MD         heparin, porcine (PF) 100 unit/mL injection flush 500 Units  500 Units Intravenous PRN Letha Silver MD        hydrocortisone sodium succinate injection 100 mg  100 mg Intravenous Once PRN Letha Silver MD        [COMPLETED] sodium chloride 0.9% 100 mL flush bag   Intravenous 1 time in Clinic/HOD Letha Silver MD 25 mL/hr at 11/15/23 1413 New Bag at 11/15/23 1413    sodium chloride 0.9% flush 10 mL  10 mL Intravenous PRN Letha Silver MD          Review of Systems   Constitutional:  Positive for fatigue. Negative for activity change, appetite change, chills, diaphoresis, fever and unexpected weight change.   HENT:  Negative for nasal congestion, mouth sores, nosebleeds, sinus pressure/congestion, sore throat and trouble swallowing.    Eyes: Negative.    Respiratory:  Positive for cough (chronic but better). Negative for shortness of breath.    Cardiovascular:  Negative for chest pain and palpitations.   Gastrointestinal:  Negative for abdominal distention, abdominal pain, blood in stool, change in bowel habit, constipation, diarrhea, nausea and vomiting.   Endocrine: Negative.    Genitourinary:  Negative for bladder incontinence, decreased urine volume, difficulty urinating, dysuria, frequency, hematuria and urgency.   Musculoskeletal:  Positive for back pain and neck pain. Negative for arthralgias, gait problem, joint swelling, leg pain and myalgias.   Integumentary:  Negative for rash.   Allergic/Immunologic: Negative.    Neurological:  Negative for dizziness, tremors, syncope, weakness, light-headedness, numbness, headaches and memory loss.   Hematological:  Negative for adenopathy. Does not bruise/bleed easily.   Psychiatric/Behavioral:  Negative for agitation, confusion, hallucinations, sleep disturbance and suicidal ideas. The patient is not nervous/anxious.           Vitals:    11/15/23 1250   BP: (!) 141/87   BP Location: Left arm   Patient Position: Sitting   Pulse: 62  "  Resp: 18   Temp: 98.1 °F (36.7 °C)   TempSrc: Oral   SpO2: 98%   Weight: 93 kg (205 lb)   Height: 6' 1" (1.854 m)         Wt Readings from Last 6 Encounters:   11/15/23 93 kg (205 lb)   09/20/23 94.9 kg (209 lb 3.2 oz)   08/24/23 92.5 kg (204 lb)   08/23/23 92.3 kg (203 lb 6.4 oz)   07/26/23 95.2 kg (209 lb 12.8 oz)   06/30/23 96.7 kg (213 lb 1.6 oz)        Body mass index is 27.05 kg/m².  Body surface area is 2.19 meters squared.  Physical Exam  Vitals and nursing note reviewed.   Constitutional:       General: He is not in acute distress.     Appearance: Normal appearance.   HENT:      Head: Normocephalic and atraumatic.      Mouth/Throat:      Mouth: Mucous membranes are moist.   Eyes:      General: No scleral icterus.     Extraocular Movements: Extraocular movements intact.      Conjunctiva/sclera: Conjunctivae normal.   Neck:      Vascular: No JVD.   Cardiovascular:      Rate and Rhythm: Normal rate and regular rhythm.      Heart sounds: No murmur heard.  Pulmonary:      Effort: Pulmonary effort is normal.      Breath sounds: Decreased breath sounds present. No wheezing or rhonchi.   Abdominal:      General: Bowel sounds are normal. There is no distension.      Palpations: Abdomen is soft.      Tenderness: There is no abdominal tenderness.   Musculoskeletal:         General: No swelling or deformity.      Cervical back: Neck supple.   Lymphadenopathy:      Head:      Right side of head: No submandibular adenopathy.      Left side of head: No submandibular adenopathy.      Cervical: No cervical adenopathy.      Upper Body:      Right upper body: No supraclavicular or axillary adenopathy.      Left upper body: No supraclavicular or axillary adenopathy.      Lower Body: No right inguinal adenopathy. No left inguinal adenopathy.   Skin:     General: Skin is warm.      Coloration: Skin is not jaundiced.      Findings: No rash.   Neurological:      General: No focal deficit present.      Mental Status: He is alert " and oriented to person, place, and time.      Cranial Nerves: Cranial nerves 2-12 are intact.   Psychiatric:         Attention and Perception: Attention normal.         Behavior: Behavior is cooperative.       ECOG SCORE    1 - Restricted in strenuous activity-ambulatory and able to carry out work of a light nature         Laboratory:  CBC with Differential:  Lab Results   Component Value Date    WBC 4.96 11/15/2023    RBC 3.75 (L) 11/15/2023    HGB 12.0 (L) 11/15/2023    HCT 38.3 (L) 11/15/2023    .1 (H) 11/15/2023    MCH 32.0 (H) 11/15/2023    MCHC 31.3 (L) 11/15/2023    RDW 12.9 11/15/2023     11/15/2023    MPV 9.3 11/15/2023        CMP:  Sodium Level   Date Value Ref Range Status   11/15/2023 136 136 - 145 mmol/L Final     Potassium Level   Date Value Ref Range Status   11/15/2023 3.4 (L) 3.5 - 5.1 mmol/L Final     Carbon Dioxide   Date Value Ref Range Status   11/15/2023 27 23 - 31 mmol/L Final     Blood Urea Nitrogen   Date Value Ref Range Status   11/15/2023 9.6 8.4 - 25.7 mg/dL Final     Creatinine   Date Value Ref Range Status   11/15/2023 0.93 0.73 - 1.18 mg/dL Final     Calcium Level Total   Date Value Ref Range Status   11/15/2023 9.1 8.8 - 10.0 mg/dL Final     Albumin Level   Date Value Ref Range Status   11/15/2023 3.3 (L) 3.4 - 4.8 g/dL Final     Bilirubin Total   Date Value Ref Range Status   11/15/2023 0.6 <=1.5 mg/dL Final     Alkaline Phosphatase   Date Value Ref Range Status   11/15/2023 68 40 - 150 unit/L Final     Aspartate Aminotransferase   Date Value Ref Range Status   11/15/2023 5 5 - 34 unit/L Final     Alanine Aminotransferase   Date Value Ref Range Status   11/15/2023 <5 0 - 55 unit/L Final         Assessment:       1) cT4N2?Mx- Stage III Squamous cell carcinoma of the right upper lobe  -Large hilar mass that extends to the RUL  -Completed weekly XRT + Carbo/Taxol on 7/12/23     2) Liver hypodensities--> suggesting cysts      Plan:       Plan: Carbo/Taxol weekly with RT-->  surgical re evaluation  Completed weekly XRT + Carbo/Taxol on 7/12/23   He completed 6 cycle of weekly Carbo/Taxol on 7/12/23 along with XRT. Restaging CT with GAURAV  Surgical evaluation -- no surgery recommended as restaging scan with GAURAV    Okay to proceed today with maintenance immunotherapy (Durvalumab) x year - okay to administer peripherally  RTC in 4 weeks with MD for TD/labs/infusion same day - he lives in [place] - pt requests mid morning or afternoon appts - date may change if he does not have surgery  Labs: CBC, CMP, TSH  Continue follow ups with Dr. Hoff    The patient was seen, interviewed and examined. Pertinent lab and radiology studies were reviewed.   The patient was given ample opportunity to ask questions, and to the best of my abilities, all questions answered to satisfaction; patient demonstrated understanding of what we discussed and agreeable to the plan. Pt instructed to call should develop concerning signs/symptoms or have further questions.     Letha Silver MD  Hematology/Oncology      Professional Services   I, Jenelle Waters LPN, acted solely as a scribe for and in the presence of Dr. Letha Silver, who performed these services.

## 2023-12-13 ENCOUNTER — INFUSION (OUTPATIENT)
Dept: INFUSION THERAPY | Facility: HOSPITAL | Age: 68
End: 2023-12-13
Attending: INTERNAL MEDICINE
Payer: MEDICARE

## 2023-12-13 ENCOUNTER — OFFICE VISIT (OUTPATIENT)
Dept: HEMATOLOGY/ONCOLOGY | Facility: CLINIC | Age: 68
End: 2023-12-13
Payer: MEDICARE

## 2023-12-13 VITALS
TEMPERATURE: 99 F | WEIGHT: 213 LBS | BODY MASS INDEX: 28.23 KG/M2 | DIASTOLIC BLOOD PRESSURE: 90 MMHG | HEART RATE: 66 BPM | HEIGHT: 73 IN | OXYGEN SATURATION: 98 % | SYSTOLIC BLOOD PRESSURE: 148 MMHG

## 2023-12-13 DIAGNOSIS — Z51.12 MAINTENANCE ANTINEOPLASTIC IMMUNOTHERAPY: ICD-10-CM

## 2023-12-13 DIAGNOSIS — C34.01 LUNG CANCER, MAIN BRONCHUS, RIGHT: Primary | ICD-10-CM

## 2023-12-13 DIAGNOSIS — C34.91 SQUAMOUS CELL CARCINOMA OF RIGHT LUNG: ICD-10-CM

## 2023-12-13 DIAGNOSIS — C34.91 SQUAMOUS CELL CARCINOMA OF RIGHT LUNG: Primary | ICD-10-CM

## 2023-12-13 PROCEDURE — 63600175 PHARM REV CODE 636 W HCPCS: Mod: JZ,JG | Performed by: INTERNAL MEDICINE

## 2023-12-13 PROCEDURE — 3008F PR BODY MASS INDEX (BMI) DOCUMENTED: ICD-10-PCS | Mod: CPTII,S$GLB,, | Performed by: INTERNAL MEDICINE

## 2023-12-13 PROCEDURE — 99215 OFFICE O/P EST HI 40 MIN: CPT | Mod: S$GLB,,, | Performed by: INTERNAL MEDICINE

## 2023-12-13 PROCEDURE — 4010F ACE/ARB THERAPY RXD/TAKEN: CPT | Mod: CPTII,S$GLB,, | Performed by: INTERNAL MEDICINE

## 2023-12-13 PROCEDURE — 1101F PT FALLS ASSESS-DOCD LE1/YR: CPT | Mod: CPTII,S$GLB,, | Performed by: INTERNAL MEDICINE

## 2023-12-13 PROCEDURE — 3080F PR MOST RECENT DIASTOLIC BLOOD PRESSURE >= 90 MM HG: ICD-10-PCS | Mod: CPTII,S$GLB,, | Performed by: INTERNAL MEDICINE

## 2023-12-13 PROCEDURE — 3008F BODY MASS INDEX DOCD: CPT | Mod: CPTII,S$GLB,, | Performed by: INTERNAL MEDICINE

## 2023-12-13 PROCEDURE — 3044F PR MOST RECENT HEMOGLOBIN A1C LEVEL <7.0%: ICD-10-PCS | Mod: CPTII,S$GLB,, | Performed by: INTERNAL MEDICINE

## 2023-12-13 PROCEDURE — 96413 CHEMO IV INFUSION 1 HR: CPT

## 2023-12-13 PROCEDURE — 3288F PR FALLS RISK ASSESSMENT DOCUMENTED: ICD-10-PCS | Mod: CPTII,S$GLB,, | Performed by: INTERNAL MEDICINE

## 2023-12-13 PROCEDURE — 4010F PR ACE/ARB THEARPY RXD/TAKEN: ICD-10-PCS | Mod: CPTII,S$GLB,, | Performed by: INTERNAL MEDICINE

## 2023-12-13 PROCEDURE — 99215 PR OFFICE/OUTPT VISIT, EST, LEVL V, 40-54 MIN: ICD-10-PCS | Mod: S$GLB,,, | Performed by: INTERNAL MEDICINE

## 2023-12-13 PROCEDURE — 3044F HG A1C LEVEL LT 7.0%: CPT | Mod: CPTII,S$GLB,, | Performed by: INTERNAL MEDICINE

## 2023-12-13 PROCEDURE — 1159F MED LIST DOCD IN RCRD: CPT | Mod: CPTII,S$GLB,, | Performed by: INTERNAL MEDICINE

## 2023-12-13 PROCEDURE — 25000003 PHARM REV CODE 250: Performed by: INTERNAL MEDICINE

## 2023-12-13 PROCEDURE — 1160F PR REVIEW ALL MEDS BY PRESCRIBER/CLIN PHARMACIST DOCUMENTED: ICD-10-PCS | Mod: CPTII,S$GLB,, | Performed by: INTERNAL MEDICINE

## 2023-12-13 PROCEDURE — 1160F RVW MEDS BY RX/DR IN RCRD: CPT | Mod: CPTII,S$GLB,, | Performed by: INTERNAL MEDICINE

## 2023-12-13 PROCEDURE — 3077F SYST BP >= 140 MM HG: CPT | Mod: CPTII,S$GLB,, | Performed by: INTERNAL MEDICINE

## 2023-12-13 PROCEDURE — 99999 PR PBB SHADOW E&M-EST. PATIENT-LVL III: CPT | Mod: PBBFAC,,, | Performed by: INTERNAL MEDICINE

## 2023-12-13 PROCEDURE — 1159F PR MEDICATION LIST DOCUMENTED IN MEDICAL RECORD: ICD-10-PCS | Mod: CPTII,S$GLB,, | Performed by: INTERNAL MEDICINE

## 2023-12-13 PROCEDURE — 3288F FALL RISK ASSESSMENT DOCD: CPT | Mod: CPTII,S$GLB,, | Performed by: INTERNAL MEDICINE

## 2023-12-13 PROCEDURE — 3080F DIAST BP >= 90 MM HG: CPT | Mod: CPTII,S$GLB,, | Performed by: INTERNAL MEDICINE

## 2023-12-13 PROCEDURE — 3077F PR MOST RECENT SYSTOLIC BLOOD PRESSURE >= 140 MM HG: ICD-10-PCS | Mod: CPTII,S$GLB,, | Performed by: INTERNAL MEDICINE

## 2023-12-13 PROCEDURE — 1101F PR PT FALLS ASSESS DOC 0-1 FALLS W/OUT INJ PAST YR: ICD-10-PCS | Mod: CPTII,S$GLB,, | Performed by: INTERNAL MEDICINE

## 2023-12-13 PROCEDURE — 99999 PR PBB SHADOW E&M-EST. PATIENT-LVL III: ICD-10-PCS | Mod: PBBFAC,,, | Performed by: INTERNAL MEDICINE

## 2023-12-13 RX ORDER — HEPARIN 100 UNIT/ML
500 SYRINGE INTRAVENOUS
Status: CANCELLED | OUTPATIENT
Start: 2024-01-10

## 2023-12-13 RX ORDER — SODIUM CHLORIDE 0.9 % (FLUSH) 0.9 %
10 SYRINGE (ML) INJECTION
Status: CANCELLED | OUTPATIENT
Start: 2024-01-10

## 2023-12-13 RX ORDER — LEVOFLOXACIN 500 MG/1
500 TABLET, FILM COATED ORAL DAILY
COMMUNITY
Start: 2023-12-01 | End: 2024-01-10

## 2023-12-13 RX ORDER — HEPARIN 100 UNIT/ML
500 SYRINGE INTRAVENOUS
Status: CANCELLED | OUTPATIENT
Start: 2023-12-13

## 2023-12-13 RX ORDER — DIPHENHYDRAMINE HYDROCHLORIDE 50 MG/ML
50 INJECTION INTRAMUSCULAR; INTRAVENOUS ONCE AS NEEDED
Status: DISCONTINUED | OUTPATIENT
Start: 2023-12-13 | End: 2023-12-13 | Stop reason: HOSPADM

## 2023-12-13 RX ORDER — BENZONATATE 100 MG/1
100 CAPSULE ORAL 3 TIMES DAILY PRN
COMMUNITY
Start: 2023-09-19

## 2023-12-13 RX ORDER — SODIUM CHLORIDE 0.9 % (FLUSH) 0.9 %
10 SYRINGE (ML) INJECTION
Status: CANCELLED | OUTPATIENT
Start: 2023-12-13

## 2023-12-13 RX ORDER — DIPHENHYDRAMINE HYDROCHLORIDE 50 MG/ML
50 INJECTION INTRAMUSCULAR; INTRAVENOUS ONCE AS NEEDED
Status: CANCELLED | OUTPATIENT
Start: 2024-01-10

## 2023-12-13 RX ORDER — EPINEPHRINE 0.3 MG/.3ML
0.3 INJECTION SUBCUTANEOUS ONCE AS NEEDED
Status: CANCELLED | OUTPATIENT
Start: 2024-01-10

## 2023-12-13 RX ORDER — SODIUM CHLORIDE 0.9 % (FLUSH) 0.9 %
10 SYRINGE (ML) INJECTION
Status: DISCONTINUED | OUTPATIENT
Start: 2023-12-13 | End: 2023-12-13 | Stop reason: HOSPADM

## 2023-12-13 RX ORDER — HEPARIN 100 UNIT/ML
500 SYRINGE INTRAVENOUS
Status: DISCONTINUED | OUTPATIENT
Start: 2023-12-13 | End: 2023-12-13 | Stop reason: HOSPADM

## 2023-12-13 RX ORDER — EPINEPHRINE 0.3 MG/.3ML
0.3 INJECTION SUBCUTANEOUS ONCE AS NEEDED
Status: DISCONTINUED | OUTPATIENT
Start: 2023-12-13 | End: 2023-12-13 | Stop reason: HOSPADM

## 2023-12-13 RX ORDER — DIPHENHYDRAMINE HYDROCHLORIDE 50 MG/ML
50 INJECTION INTRAMUSCULAR; INTRAVENOUS ONCE AS NEEDED
Status: CANCELLED | OUTPATIENT
Start: 2023-12-13

## 2023-12-13 RX ORDER — EPINEPHRINE 0.3 MG/.3ML
0.3 INJECTION SUBCUTANEOUS ONCE AS NEEDED
Status: CANCELLED | OUTPATIENT
Start: 2023-12-13

## 2023-12-13 RX ADMIN — SODIUM CHLORIDE 1500 MG: 9 INJECTION, SOLUTION INTRAVENOUS at 11:12

## 2023-12-13 NOTE — PLAN OF CARE
Problem: Adult Inpatient Plan of Care  Goal: Plan of Care Review  Outcome: Met  Flowsheets (Taken 12/13/2023 7187)  Plan of Care Reviewed With: patient  Goal: Absence of Hospital-Acquired Illness or Injury  Outcome: Met  Intervention: Identify and Manage Fall Risk  Flowsheets (Taken 12/13/2023 1255)  Safety Promotion/Fall Prevention:   assistive device/personal item within reach   Fall Risk reviewed with patient/family   in recliner, wheels locked   family to remain at bedside     Pt tolerated infusion well. Next appt reviewed; pt denied questions or further needs at the time of discharge.

## 2023-12-13 NOTE — PROGRESS NOTES
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  The mass abuts the right main pulmonary artery and right main stem bronchus.  Subcentimeter mediastinal lymph node noted.  One of LN slightly hypermetabolic with SUV of 3 and is located laterally.  Patchy hypermetabolism within the liver as suspected, however, there are few tiny focal areas of hypermetabolism out of proportion to the rest of the liver.  At least 1 of this corresponds to a low-density focus seen in the noncontrast exam.  CT 5/12/2023: changes consistent with COPD and emphysema in the lungs bilaterally with multiple bulla bleb seen. mass seen in the right hilum extending into the right upper lobe.  It is somewhat infiltrative.  This was seen on the prior PET-CT as well.  The findings are not significantly changed.  There are multiple associated satellite nodules in the right upper lobe.  Rough dimensions of the hilar component of the mass is 5.4 x 5.1 cm and rough measurements of the dominant lesion in the right upper lobe is 11 cm x 3.6 cm.  The mass extends into the right mainstem and right upper lobe bronchus.  This was seen on the prior examination as well.  There is narrowing of the right upper lobe bronchus there is some narrowing of the right upper lobe pulmonary arteries.  Findings are similar to the prior examination.  Scattered areas of punctate subcentimeter nodularity is seen in the right lower lobe.  These are  too small to characterize and similar changes were seen previously.    No pleural effusion is seen.  Thoracic aorta is normal in caliber.  There is some mediastinal lymphadenopathy seen.  Reference lymph node is measured in the precarinal region on image number 39 series 2 at 1.5 x 0.9 cm.  This is similar to the prior examination.  Scattered punctate areas of hypoattenuation are seen throughout the liver.  Similar changes were seen previously.  The lesions are too small to characterize but may represent cysts.  The largest lesion is seen in segment 4.  It does not enhance and measures 2 cm x 1.5 cm.  Findings may represent a cyst.  No adrenal nodules are seen.  CT Head  @ [facility]. Mild chronic white matter changes. Old left caudate lobe lesion. 2. No evidence of metastatic disease.  CT C/A/P 9/15/2023:   1. Interval resolution of previously seen endobronchial right hilar mass.  2. Improved aeration of the right upper lobe.  There are residual but improved patchy irregular opacities in the right upper lobe, predominantly peripherally located  3. Interval improvement of mediastinal lymph nodes  4. Unchanged hepatic cyst and too small to characterize hepatic hypodensities     Pathology:  02/10/2023:  Right upper lobe lung needle biopsy: Scattered highly atypical squamous cells, suspicious for squamous cell carcinoma in a background of abundant acute inflammation and necrosis  Right upper lobe, brushing:  Positive for malignant cells.  Squamous cell carcinoma, moderately differentiated.  Right upper lobe biopsy squamous cell carcinoma, moderately differentiated with focal keratinization.  Right lung washings positive for malignant cells.  Squamous cell carcinoma in a background of abundant necrosis.      CLINICAL HISTORY:       Patient: Deondre Ocampo IV is a 68 y.o. male.  Kindly referred for newly diagnosed squamous cell carcinoma of the lung.    He reports that he was being evaluated for a right  side pain went imaging studies showed a mass in the lung.  I do not have the CTs.  But he eventually underwent bronchoscopy that showed squamous cell carcinoma.  PET-CT showed the right suprahilar mass with possible mediastinal lymphadenopathy and questionable liver lesions.  He is here to discuss further recommendations.    He is here with his wife.  Patient has history of 53 pack year smoking.  He reports that his breathing is fine.  He denies any chest pain, short of breath or coughing.  No hemoptysis.  He has an appointment to see the radiation oncologist next week.    Chief Complaint: Occassional abdominal pain      Interval History:  He completed 6 cycle of weekly Carbo/Taxol on 7/12/23 along with XRT. Restaging CT with GAURAV    Patient presents today in follow up for continuation of Imfinzi, C2 due today. He is accompanied by his wife. His last visit was on 9/20/23 and he was unable to follow up as planned originally due to having car trouble and death in the family. He continues with coughing up phlegm in the morning, no worsening. He was seen by his PCP, prescribed a trial of steroids, Levaquin x 10 days and Tessalon Pearls.  He continues with fatigue, but attributes it to not moving around much since back surgery years ago. Otherwise, he is doing well. Denies fever, chills, sweats. No chest pain or shortness of breath. No bleeding.  He asked if it's okay to drink a beer or alcohol every now and then. He admits to still smoking from time to time due to stressful situation at home. They are taking care of a great-grandson.  He reports occ abdominal pain but is not bad. He is moving his bowels OK.    Past Medical History:   Diagnosis Date    Diabetes mellitus     High cholesterol     Hypertension     Lung cancer       Past Surgical History:   Procedure Laterality Date    BACK SURGERY      DEBRIDEMENT TENNIS ELBOW Right     PERIPHERALLY INSERTED CENTRAL CATHETER INSERTION Left 5/29/2023    Procedure:  Insertion-picc;  Surgeon: Letha Silver MD;  [facility]: [facility];  Service: Oncology;  Laterality: Left;    right arm       Family History   Problem Relation Age of Onset    Coronary artery disease Mother     No Known Problems Father     Coronary artery disease Brother     Cancer Brother     Diabetes Brother     Heart failure Brother     Diabetes Brother     Leukemia Brother     Leukemia Daughter      Social Connections: Not on file       Review of patient's allergies indicates:   Allergen Reactions    Ace inhibitors Swelling     Other reaction(s): Angioedema, Respiratory distress    Penicillins Itching and Rash      Current Outpatient Medications on File Prior to Visit   Medication Sig Dispense Refill    aspirin (ECOTRIN) 81 MG EC tablet Take 81 mg by mouth once daily.      benzonatate (TESSALON) 100 MG capsule Take 100 mg by mouth 3 (three) times daily as needed.      dicyclomine (BENTYL) 20 mg tablet Take 20 mg by mouth once daily. Once daily in the AM.      glimepiride (AMARYL) 2 MG tablet Take 2 mg by mouth before breakfast.      levoFLOXacin (LEVAQUIN) 500 MG tablet Take 500 mg by mouth once daily.      losartan (COZAAR) 100 MG tablet Take 100 mg by mouth once daily.      metoprolol tartrate (LOPRESSOR) 50 MG tablet Take 50 mg by mouth 2 (two) times daily.      oxyCODONE-acetaminophen (PERCOCET)  mg per tablet Take 1 tablet by mouth every 4 (four) hours as needed. for pain.      pantoprazole (PROTONIX) 40 MG tablet Take 40 mg by mouth.      pravastatin (PRAVACHOL) 20 MG tablet Take 20 mg by mouth once daily.      TRULICITY 1.5 mg/0.5 mL pen injector SMARTSI pre-filled pen syringe SUB-Q Once a Week      [DISCONTINUED] water Liqd 150 mL with MILK OF MAGNESIA 400 mg/5 mL Susp 400 mg, diphenhydrAMINE 12.5 mg/5 mL Elix 60 mg, nystatin 100,000 unit/mL Susp 500,000 Units Take 5 mLs by mouth.       No current facility-administered medications on file prior to visit.      Review of Systems  "  Constitutional:  Positive for fatigue. Negative for activity change, appetite change, chills, diaphoresis, fever and unexpected weight change.   HENT:  Negative for nasal congestion, mouth sores, nosebleeds, sinus pressure/congestion, sore throat and trouble swallowing.    Eyes: Negative.    Respiratory:  Positive for cough (chronic but better). Negative for shortness of breath.    Cardiovascular:  Negative for chest pain and palpitations.   Gastrointestinal:  Negative for abdominal distention, abdominal pain, blood in stool, change in bowel habit, constipation, diarrhea, nausea and vomiting.   Endocrine: Negative.    Genitourinary:  Negative for bladder incontinence, decreased urine volume, difficulty urinating, dysuria, frequency, hematuria and urgency.   Musculoskeletal:  Positive for back pain (same) and neck pain (same). Negative for arthralgias, gait problem, joint swelling, leg pain and myalgias.   Integumentary:  Negative for rash.   Allergic/Immunologic: Negative.    Neurological:  Negative for dizziness, tremors, syncope, weakness, light-headedness, numbness, headaches and memory loss.   Hematological:  Negative for adenopathy. Does not bruise/bleed easily.   Psychiatric/Behavioral:  Negative for agitation, confusion, hallucinations, sleep disturbance and suicidal ideas. The patient is not nervous/anxious.           Vitals:    12/13/23 0952   BP: (!) 148/90   BP Location: Left arm   Patient Position: Sitting   Pulse: 66   Temp: 98.5 °F (36.9 °C)   TempSrc: Oral   SpO2: 98%   Weight: 96.6 kg (213 lb)   Height: 6' 1" (1.854 m)         Wt Readings from Last 6 Encounters:   12/13/23 96.6 kg (213 lb)   11/15/23 93 kg (205 lb)   09/20/23 94.9 kg (209 lb 3.2 oz)   08/24/23 92.5 kg (204 lb)   08/23/23 92.3 kg (203 lb 6.4 oz)   07/26/23 95.2 kg (209 lb 12.8 oz)        Body mass index is 28.1 kg/m².  Body surface area is 2.23 meters squared.  Physical Exam  Vitals and nursing note reviewed.   Constitutional:       " General: He is not in acute distress.     Appearance: Normal appearance.   HENT:      Head: Normocephalic and atraumatic.      Mouth/Throat:      Mouth: Mucous membranes are moist.   Eyes:      General: No scleral icterus.     Extraocular Movements: Extraocular movements intact.      Conjunctiva/sclera: Conjunctivae normal.   Neck:      Vascular: No JVD.   Cardiovascular:      Rate and Rhythm: Normal rate and regular rhythm.      Heart sounds: No murmur heard.  Pulmonary:      Effort: Pulmonary effort is normal.      Breath sounds: Decreased breath sounds present. No wheezing or rhonchi.   Abdominal:      General: Bowel sounds are normal. There is no distension.      Palpations: Abdomen is soft.      Tenderness: There is no abdominal tenderness.   Musculoskeletal:         General: No swelling or deformity.      Cervical back: Neck supple.   Lymphadenopathy:      Head:      Right side of head: No submandibular adenopathy.      Left side of head: No submandibular adenopathy.      Cervical: No cervical adenopathy.      Upper Body:      Right upper body: No supraclavicular or axillary adenopathy.      Left upper body: No supraclavicular or axillary adenopathy.      Lower Body: No right inguinal adenopathy. No left inguinal adenopathy.   Skin:     General: Skin is warm.      Coloration: Skin is not jaundiced.      Findings: No rash.   Neurological:      Mental Status: He is alert and oriented to person, place, and time.      Cranial Nerves: Cranial nerves 2-12 are intact.   Psychiatric:         Attention and Perception: Attention normal.         Behavior: Behavior is cooperative.       ECOG SCORE    1 - Restricted in strenuous activity-ambulatory and able to carry out work of a light nature         Laboratory:  CBC with Differential:  Lab Results   Component Value Date    WBC 6.17 12/13/2023    RBC 3.79 (L) 12/13/2023    HGB 12.4 (L) 12/13/2023    HCT 38.4 (L) 12/13/2023    .3 (H) 12/13/2023    MCH 32.7 (H)  12/13/2023    MCHC 32.3 (L) 12/13/2023    RDW 14.8 12/13/2023     12/13/2023    MPV 9.2 12/13/2023        CMP:  Sodium Level   Date Value Ref Range Status   12/13/2023 143 136 - 145 mmol/L Final     Potassium Level   Date Value Ref Range Status   12/13/2023 4.1 3.5 - 5.1 mmol/L Final     Carbon Dioxide   Date Value Ref Range Status   12/13/2023 32 (H) 23 - 31 mmol/L Final     Blood Urea Nitrogen   Date Value Ref Range Status   12/13/2023 10.1 8.4 - 25.7 mg/dL Final     Creatinine   Date Value Ref Range Status   12/13/2023 1.03 0.73 - 1.18 mg/dL Final     Calcium Level Total   Date Value Ref Range Status   12/13/2023 9.0 8.8 - 10.0 mg/dL Final     Albumin Level   Date Value Ref Range Status   12/13/2023 3.3 (L) 3.4 - 4.8 g/dL Final     Bilirubin Total   Date Value Ref Range Status   12/13/2023 0.6 <=1.5 mg/dL Final     Alkaline Phosphatase   Date Value Ref Range Status   12/13/2023 59 40 - 150 unit/L Final     Aspartate Aminotransferase   Date Value Ref Range Status   12/13/2023 37 (H) 5 - 34 unit/L Final     Alanine Aminotransferase   Date Value Ref Range Status   12/13/2023 15 0 - 55 unit/L Final         Assessment:       1) cT4N2?Mx- Stage III Squamous cell carcinoma of the right upper lobe  -Large hilar mass that extends to the RUL  -Completed 6 cycle of weekly Carbo/Taxol on 7/12/23 along with XRT.   -Restaging CT with GAURAV  -Surgical reevaluation -- no surgery recommended as restaging scan with GAURAV  -maintenance immunotherapy (Durvalumab)-11/15/2023-present    2) Liver hypodensities--> suggesting cysts      Plan:         Okay to proceed today with maintenance immunotherapy (Durvalumab) x year - okay to administer peripherally  RTC in 4 weeks with NP for TD/labs/infusion same day - he lives in [place] - pt requests appointments @ 11 am or 1 pm  Labs: CBC, CMP, TSH - standing order placed  Continue follow ups with Dr. Hoff  CT C/A/P every 3 months - due mid Dec. 2023 - ordered  RTC in 3 weeks with  MD for CT results via telemedicine  Smoking cessation strongly encouraged    The patient was seen, interviewed and examined. Pertinent lab and radiology studies were reviewed.   The patient was given ample opportunity to ask questions, and to the best of my abilities, all questions answered to satisfaction; patient demonstrated understanding of what we discussed and agreeable to the plan. Pt instructed to call should develop concerning signs/symptoms or have further questions.     Letha Silver MD  Hematology/Oncology      Professional Services   I, Jenelle Waters LPN, acted solely as a scribe for and in the presence of Dr. Letha Silver, who performed these services.

## 2023-12-28 DIAGNOSIS — C34.91 SQUAMOUS CELL CARCINOMA OF RIGHT LUNG: Primary | ICD-10-CM

## 2023-12-28 DIAGNOSIS — Z51.12 MAINTENANCE ANTINEOPLASTIC IMMUNOTHERAPY: ICD-10-CM

## 2024-01-04 ENCOUNTER — HOSPITAL ENCOUNTER (OUTPATIENT)
Dept: RADIOLOGY | Facility: HOSPITAL | Age: 69
Discharge: HOME | End: 2024-01-04
Attending: INTERNAL MEDICINE
Payer: MEDICARE

## 2024-01-04 DIAGNOSIS — Z51.12 MAINTENANCE ANTINEOPLASTIC IMMUNOTHERAPY: ICD-10-CM

## 2024-01-04 DIAGNOSIS — C34.91 SQUAMOUS CELL CARCINOMA OF RIGHT LUNG: ICD-10-CM

## 2024-01-04 PROCEDURE — 71260 CT THORAX DX C+: CPT | Mod: TC

## 2024-01-04 PROCEDURE — 25500020 PHARM REV CODE 255: Performed by: INTERNAL MEDICINE

## 2024-01-04 RX ADMIN — IOPAMIDOL 100 ML: 755 INJECTION, SOLUTION INTRAVENOUS at 01:01

## 2024-01-10 ENCOUNTER — INFUSION (OUTPATIENT)
Dept: INFUSION THERAPY | Facility: HOSPITAL | Age: 69
End: 2024-01-10
Attending: INTERNAL MEDICINE
Payer: MEDICARE

## 2024-01-10 ENCOUNTER — OFFICE VISIT (OUTPATIENT)
Dept: HEMATOLOGY/ONCOLOGY | Facility: CLINIC | Age: 69
End: 2024-01-10
Payer: MEDICARE

## 2024-01-10 VITALS
TEMPERATURE: 98 F | RESPIRATION RATE: 17 BRPM | HEIGHT: 73 IN | HEART RATE: 72 BPM | SYSTOLIC BLOOD PRESSURE: 149 MMHG | WEIGHT: 208.19 LBS | BODY MASS INDEX: 27.59 KG/M2 | DIASTOLIC BLOOD PRESSURE: 96 MMHG | OXYGEN SATURATION: 96 %

## 2024-01-10 VITALS
RESPIRATION RATE: 17 BRPM | HEIGHT: 73 IN | BODY MASS INDEX: 27.59 KG/M2 | SYSTOLIC BLOOD PRESSURE: 149 MMHG | TEMPERATURE: 98 F | OXYGEN SATURATION: 96 % | DIASTOLIC BLOOD PRESSURE: 96 MMHG | WEIGHT: 208.13 LBS | HEART RATE: 72 BPM

## 2024-01-10 DIAGNOSIS — N64.4 BREAST PAIN, RIGHT: ICD-10-CM

## 2024-01-10 DIAGNOSIS — C34.91 SQUAMOUS CELL CARCINOMA OF RIGHT LUNG: Primary | ICD-10-CM

## 2024-01-10 DIAGNOSIS — R53.83 FATIGUE, UNSPECIFIED TYPE: ICD-10-CM

## 2024-01-10 DIAGNOSIS — Z51.12 MAINTENANCE ANTINEOPLASTIC IMMUNOTHERAPY: ICD-10-CM

## 2024-01-10 PROCEDURE — 99215 OFFICE O/P EST HI 40 MIN: CPT | Mod: S$GLB,,, | Performed by: NURSE PRACTITIONER

## 2024-01-10 PROCEDURE — 96413 CHEMO IV INFUSION 1 HR: CPT

## 2024-01-10 PROCEDURE — 1125F AMNT PAIN NOTED PAIN PRSNT: CPT | Mod: CPTII,S$GLB,, | Performed by: NURSE PRACTITIONER

## 2024-01-10 PROCEDURE — 63600175 PHARM REV CODE 636 W HCPCS: Mod: JZ,JG | Performed by: INTERNAL MEDICINE

## 2024-01-10 PROCEDURE — 3080F DIAST BP >= 90 MM HG: CPT | Mod: CPTII,S$GLB,, | Performed by: NURSE PRACTITIONER

## 2024-01-10 PROCEDURE — 1159F MED LIST DOCD IN RCRD: CPT | Mod: CPTII,S$GLB,, | Performed by: NURSE PRACTITIONER

## 2024-01-10 PROCEDURE — 1101F PT FALLS ASSESS-DOCD LE1/YR: CPT | Mod: CPTII,S$GLB,, | Performed by: NURSE PRACTITIONER

## 2024-01-10 PROCEDURE — 3077F SYST BP >= 140 MM HG: CPT | Mod: CPTII,S$GLB,, | Performed by: NURSE PRACTITIONER

## 2024-01-10 PROCEDURE — 25000003 PHARM REV CODE 250: Performed by: INTERNAL MEDICINE

## 2024-01-10 PROCEDURE — 3288F FALL RISK ASSESSMENT DOCD: CPT | Mod: CPTII,S$GLB,, | Performed by: NURSE PRACTITIONER

## 2024-01-10 PROCEDURE — 99999 PR PBB SHADOW E&M-EST. PATIENT-LVL IV: CPT | Mod: PBBFAC,,, | Performed by: NURSE PRACTITIONER

## 2024-01-10 PROCEDURE — 1160F RVW MEDS BY RX/DR IN RCRD: CPT | Mod: CPTII,S$GLB,, | Performed by: NURSE PRACTITIONER

## 2024-01-10 PROCEDURE — 3008F BODY MASS INDEX DOCD: CPT | Mod: CPTII,S$GLB,, | Performed by: NURSE PRACTITIONER

## 2024-01-10 RX ORDER — EPINEPHRINE 0.3 MG/.3ML
0.3 INJECTION SUBCUTANEOUS ONCE AS NEEDED
Status: DISCONTINUED | OUTPATIENT
Start: 2024-01-10 | End: 2024-01-10 | Stop reason: HOSPADM

## 2024-01-10 RX ORDER — OXYCODONE AND ACETAMINOPHEN 10; 325 MG/1; MG/1
1 TABLET ORAL EVERY 6 HOURS PRN
Qty: 30 TABLET | Refills: 0 | Status: SHIPPED | OUTPATIENT
Start: 2024-01-10

## 2024-01-10 RX ORDER — HEPARIN 100 UNIT/ML
500 SYRINGE INTRAVENOUS
Status: DISCONTINUED | OUTPATIENT
Start: 2024-01-10 | End: 2024-01-10 | Stop reason: HOSPADM

## 2024-01-10 RX ORDER — PREDNISONE 20 MG/1
TABLET ORAL
COMMUNITY
Start: 2023-12-01 | End: 2024-01-10

## 2024-01-10 RX ORDER — DIPHENHYDRAMINE HYDROCHLORIDE 50 MG/ML
50 INJECTION, SOLUTION INTRAMUSCULAR; INTRAVENOUS ONCE AS NEEDED
Status: DISCONTINUED | OUTPATIENT
Start: 2024-01-10 | End: 2024-01-10 | Stop reason: HOSPADM

## 2024-01-10 RX ORDER — SODIUM CHLORIDE 0.9 % (FLUSH) 0.9 %
10 SYRINGE (ML) INJECTION
Status: DISCONTINUED | OUTPATIENT
Start: 2024-01-10 | End: 2024-01-10 | Stop reason: HOSPADM

## 2024-01-10 RX ADMIN — SODIUM CHLORIDE 1500 MG: 9 INJECTION, SOLUTION INTRAVENOUS at 02:01

## 2024-01-10 RX ADMIN — SODIUM CHLORIDE: 9 INJECTION, SOLUTION INTRAVENOUS at 01:01

## 2024-01-10 NOTE — NURSING
Pt tolerated C3 Imfinzi without any adverse events. Pt discharged home in stable condition, future appts given.

## 2024-01-10 NOTE — PROGRESS NOTES
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  The mass abuts the right main pulmonary artery and right main stem bronchus.  Subcentimeter mediastinal lymph node noted.  One of LN slightly hypermetabolic with SUV of 3 and is located laterally.  Patchy hypermetabolism within the liver as suspected, however, there are few tiny focal areas of hypermetabolism out of proportion to the rest of the liver.  At least 1 of this corresponds to a low-density focus seen in the noncontrast exam.  CT 5/12/2023: changes consistent with COPD and emphysema in the lungs bilaterally with multiple bulla bleb seen. mass seen in the right hilum extending into the right upper lobe.  It is somewhat infiltrative.  This was seen on the prior PET-CT as well.  The findings are not significantly changed.  There are multiple associated satellite nodules in the right upper lobe.  Rough dimensions of the hilar component of the mass is 5.4 x 5.1 cm and rough measurements of the dominant lesion in the right upper lobe is 11 cm x 3.6 cm.  The mass extends into the right mainstem and right upper lobe bronchus.  This was seen on the prior examination as well.  There is narrowing of the right upper lobe bronchus there is some narrowing of the right upper lobe pulmonary arteries.  Findings are similar to the prior examination.  Scattered areas of punctate subcentimeter nodularity is seen in the right lower lobe.  These are  too small to characterize and similar changes were seen previously.    No pleural effusion is seen.  Thoracic aorta is normal in caliber.  There is some mediastinal lymphadenopathy seen.  Reference lymph node is measured in the precarinal region on image number 39 series 2 at 1.5 x 0.9 cm.  This is similar to the prior examination.  Scattered punctate areas of hypoattenuation are seen throughout the liver.  Similar changes were seen previously.  The lesions are too small to characterize but may represent cysts.  The largest lesion is seen in segment 4.  It does not enhance and measures 2 cm x 1.5 cm.  Findings may represent a cyst.  No adrenal nodules are seen.  CT Head  @ [facility]. Mild chronic white matter changes. Old left caudate lobe lesion. 2. No evidence of metastatic disease.  CT C/A/P 9/15/2023:   1. Interval resolution of previously seen endobronchial right hilar mass.  2. Improved aeration of the right upper lobe.  There are residual but improved patchy irregular opacities in the right upper lobe, predominantly peripherally located  3. Interval improvement of mediastinal lymph nodes  4. Unchanged hepatic cyst and too small to characterize hepatic hypodensities     Pathology:  02/10/2023:  Right upper lobe lung needle biopsy: Scattered highly atypical squamous cells, suspicious for squamous cell carcinoma in a background of abundant acute inflammation and necrosis  Right upper lobe, brushing:  Positive for malignant cells.  Squamous cell carcinoma, moderately differentiated.  Right upper lobe biopsy squamous cell carcinoma, moderately differentiated with focal keratinization.  Right lung washings positive for malignant cells.  Squamous cell carcinoma in a background of abundant necrosis.      CLINICAL HISTORY:       Patient: Deondre Ocampo IV is a 68 y.o. male.  Kindly referred for newly diagnosed squamous cell carcinoma of the lung.    He reports that he was being evaluated for a right  side pain went imaging studies showed a mass in the lung.  I do not have the CTs.  But he eventually underwent bronchoscopy that showed squamous cell carcinoma.  PET-CT showed the right suprahilar mass with possible mediastinal lymphadenopathy and questionable liver lesions.  He is here to discuss further recommendations.    He is here with his wife.  Patient has history of 53 pack year smoking.  He reports that his breathing is fine.  He denies any chest pain, short of breath or coughing.  No hemoptysis.  He has an appointment to see the radiation oncologist next week.    Chief Complaint: Results (PT concern about a pain in his RT Upper chest area for about a month.)      Interval History:  He completed 6 cycle of weekly Carbo/Taxol on 7/12/23 along with XRT. Restaging CT with GAURAV    Patient presents today in follow up for continuation of Imfinzi, C4 due today. He is accompanied by his wife. He is c/o tenderness to his right breast, specifically the tissue beneath his nipple. No palpable mass but tenderness with palpation. Also c/o pain to his right shoulder. Recent imaging on last week on 1/4/24:    Impression:     1. No detrimental change from previous exam.  2. Similar small mediastinal lymph nodes and patchy irregular opacities in the right upper lobe  3. Trace right pleural fluid, similar to previous.    Past Medical History:   Diagnosis Date    Diabetes mellitus     High cholesterol     Hypertension     Lung cancer       Past Surgical History:   Procedure Laterality Date    BACK SURGERY      DEBRIDEMENT TENNIS ELBOW Right     PERIPHERALLY INSERTED CENTRAL CATHETER INSERTION Left 5/29/2023    Procedure: Insertion-picc;  Surgeon: Letha Silver MD;  Location: [facility];  Service: Oncology;  Laterality: Left;    right arm       Family History   Problem Relation Age of Onset    Coronary artery disease Mother     No Known Problems Father     Coronary artery disease Brother     Cancer Brother     Diabetes  Brother     Heart failure Brother     Diabetes Brother     Leukemia Brother     Leukemia Daughter      Social Connections: Not on file       Review of patient's allergies indicates:   Allergen Reactions    Ace inhibitors Swelling     Other reaction(s): Angioedema, Respiratory distress    Penicillins Itching and Rash      Current Outpatient Medications on File Prior to Visit   Medication Sig Dispense Refill    aspirin (ECOTRIN) 81 MG EC tablet Take 81 mg by mouth once daily.      benzonatate (TESSALON) 100 MG capsule Take 100 mg by mouth 3 (three) times daily as needed.      dicyclomine (BENTYL) 20 mg tablet Take 20 mg by mouth once daily. Once daily in the AM.      glimepiride (AMARYL) 2 MG tablet Take 2 mg by mouth before breakfast.      levoFLOXacin (LEVAQUIN) 500 MG tablet Take 500 mg by mouth once daily.      losartan (COZAAR) 100 MG tablet Take 100 mg by mouth once daily.      metoprolol tartrate (LOPRESSOR) 50 MG tablet Take 50 mg by mouth 2 (two) times daily.      oxyCODONE-acetaminophen (PERCOCET)  mg per tablet Take 1 tablet by mouth every 4 (four) hours as needed. for pain.      pantoprazole (PROTONIX) 40 MG tablet Take 40 mg by mouth.      pravastatin (PRAVACHOL) 20 MG tablet Take 20 mg by mouth once daily.      predniSONE (DELTASONE) 20 MG tablet Take by mouth.      TRULICITY 1.5 mg/0.5 mL pen injector SMARTSI pre-filled pen syringe SUB-Q Once a Week       No current facility-administered medications on file prior to visit.      Review of Systems   Constitutional:  Positive for fatigue. Negative for activity change, appetite change, chills, diaphoresis, fever and unexpected weight change.   HENT:  Negative for nasal congestion, mouth sores, nosebleeds, sinus pressure/congestion, sore throat and trouble swallowing.    Eyes: Negative.    Respiratory:  Positive for cough (chronic but better). Negative for shortness of breath.    Cardiovascular:  Negative for chest pain and palpitations.  "  Gastrointestinal:  Negative for abdominal distention, abdominal pain, blood in stool, change in bowel habit, constipation, diarrhea, nausea and vomiting.   Endocrine: Negative.    Genitourinary:  Negative for bladder incontinence, decreased urine volume, difficulty urinating, dysuria, frequency, hematuria and urgency.   Musculoskeletal:  Positive for back pain (same) and neck pain (same). Negative for arthralgias, gait problem, joint swelling, leg pain and myalgias.   Integumentary:  Negative for rash.        Right breast pain (nipple)   Allergic/Immunologic: Negative.    Neurological:  Negative for dizziness, tremors, syncope, weakness, light-headedness, numbness, headaches and memory loss.   Hematological:  Negative for adenopathy. Does not bruise/bleed easily.   Psychiatric/Behavioral:  Negative for agitation, confusion, hallucinations, sleep disturbance and suicidal ideas. The patient is not nervous/anxious.           Vitals:    01/10/24 1249   BP: (!) 149/96   BP Location: Left arm   Patient Position: Sitting   Pulse: 72   Resp: 17   Temp: 98.3 °F (36.8 °C)   TempSrc: Oral   SpO2: 96%   Weight: 94.4 kg (208 lb 3.2 oz)   Height: 6' 1" (1.854 m)         Wt Readings from Last 6 Encounters:   01/10/24 94.4 kg (208 lb 3.2 oz)   12/13/23 96.6 kg (213 lb)   11/15/23 93 kg (205 lb)   09/20/23 94.9 kg (209 lb 3.2 oz)   08/24/23 92.5 kg (204 lb)   08/23/23 92.3 kg (203 lb 6.4 oz)        Body mass index is 27.47 kg/m².  Body surface area is 2.21 meters squared.  Physical Exam  Vitals and nursing note reviewed.   Constitutional:       General: He is not in acute distress.     Appearance: Normal appearance.   HENT:      Head: Normocephalic and atraumatic.      Mouth/Throat:      Mouth: Mucous membranes are moist.   Eyes:      General: No scleral icterus.     Extraocular Movements: Extraocular movements intact.      Conjunctiva/sclera: Conjunctivae normal.   Neck:      Vascular: No JVD.   Cardiovascular:      Rate and " Rhythm: Normal rate and regular rhythm.      Heart sounds: No murmur heard.  Pulmonary:      Effort: Pulmonary effort is normal.      Breath sounds: Decreased breath sounds present. No wheezing or rhonchi.   Abdominal:      General: Bowel sounds are normal. There is no distension.      Palpations: Abdomen is soft.      Tenderness: There is no abdominal tenderness.   Musculoskeletal:         General: No swelling or deformity.      Cervical back: Neck supple.   Lymphadenopathy:      Head:      Right side of head: No submandibular adenopathy.      Left side of head: No submandibular adenopathy.      Cervical: No cervical adenopathy.      Upper Body:      Right upper body: No supraclavicular or axillary adenopathy.      Left upper body: No supraclavicular or axillary adenopathy.      Lower Body: No right inguinal adenopathy. No left inguinal adenopathy.   Skin:     General: Skin is warm.      Coloration: Skin is not jaundiced.      Findings: No rash.   Neurological:      Mental Status: He is alert and oriented to person, place, and time.      Cranial Nerves: Cranial nerves 2-12 are intact.   Psychiatric:         Attention and Perception: Attention normal.         Behavior: Behavior is cooperative.     ECOG SCORE             Laboratory:  CBC with Differential:  Lab Results   Component Value Date    WBC 3.71 (L) 01/10/2024    RBC 3.70 (L) 01/10/2024    HGB 11.7 (L) 01/10/2024    HCT 36.2 (L) 01/10/2024    MCV 97.8 (H) 01/10/2024    MCH 31.6 (H) 01/10/2024    MCHC 32.3 (L) 01/10/2024    RDW 14.5 01/10/2024     01/10/2024    MPV 8.6 01/10/2024        CMP:  Sodium Level   Date Value Ref Range Status   12/13/2023 143 136 - 145 mmol/L Final     Potassium Level   Date Value Ref Range Status   12/13/2023 4.1 3.5 - 5.1 mmol/L Final     Carbon Dioxide   Date Value Ref Range Status   12/13/2023 32 (H) 23 - 31 mmol/L Final     Blood Urea Nitrogen   Date Value Ref Range Status   12/13/2023 10.1 8.4 - 25.7 mg/dL Final      Creatinine   Date Value Ref Range Status   12/13/2023 1.03 0.73 - 1.18 mg/dL Final     Calcium Level Total   Date Value Ref Range Status   12/13/2023 9.0 8.8 - 10.0 mg/dL Final     Albumin Level   Date Value Ref Range Status   12/13/2023 3.3 (L) 3.4 - 4.8 g/dL Final     Bilirubin Total   Date Value Ref Range Status   12/13/2023 0.6 <=1.5 mg/dL Final     Alkaline Phosphatase   Date Value Ref Range Status   12/13/2023 59 40 - 150 unit/L Final     Aspartate Aminotransferase   Date Value Ref Range Status   12/13/2023 37 (H) 5 - 34 unit/L Final     Alanine Aminotransferase   Date Value Ref Range Status   12/13/2023 15 0 - 55 unit/L Final         Assessment:       1) cT4N2?Mx- Stage III Squamous cell carcinoma of the right upper lobe  -Large hilar mass that extends to the RUL  -Completed 6 cycle of weekly Carbo/Taxol on 7/12/23 along with XRT.   -Restaging CT with GAURAV  -Surgical reevaluation -- no surgery recommended as restaging scan with GAURAV  -maintenance immunotherapy (Durvalumab)-11/15/2023-present    2) Liver hypodensities--> suggesting cysts      Plan:         Okay to proceed today with maintenance immunotherapy (Durvalumab) x 1 year - okay to administer peripherally  To eval right breast pain beneath nipple, ordered right breast US.  Refilled short course of pain medication.   Continue follow ups with Dr. Hoff  CT C/A/P every 3 months - due 4/2024   RTC in 4 weeks with NP for TD/labs/infusion same day - he lives in [place] - pt requests appointments @ 11 am or 1 pm    JYOTHI Gay

## 2024-01-24 ENCOUNTER — PATIENT MESSAGE (OUTPATIENT)
Dept: HEMATOLOGY/ONCOLOGY | Facility: CLINIC | Age: 69
End: 2024-01-24
Payer: MEDICARE

## 2024-01-24 DIAGNOSIS — N64.4 BREAST PAIN, RIGHT: Primary | ICD-10-CM

## 2024-02-08 ENCOUNTER — HOSPITAL ENCOUNTER (OUTPATIENT)
Dept: RADIOLOGY | Facility: HOSPITAL | Age: 69
Discharge: HOME | End: 2024-02-08
Attending: NURSE PRACTITIONER
Payer: MEDICARE

## 2024-02-08 VITALS — HEIGHT: 73 IN | BODY MASS INDEX: 27.7 KG/M2 | WEIGHT: 209 LBS

## 2024-02-08 DIAGNOSIS — N64.4 BREAST PAIN, RIGHT: ICD-10-CM

## 2024-02-08 DIAGNOSIS — N64.4 BREAST PAIN: ICD-10-CM

## 2024-02-08 PROCEDURE — 77066 DX MAMMO INCL CAD BI: CPT | Mod: 26,,, | Performed by: RADIOLOGY

## 2024-02-08 PROCEDURE — 77062 BREAST TOMOSYNTHESIS BI: CPT | Mod: 26,,, | Performed by: RADIOLOGY

## 2024-02-08 PROCEDURE — 77066 DX MAMMO INCL CAD BI: CPT | Mod: TC

## 2024-02-08 PROCEDURE — 76642 ULTRASOUND BREAST LIMITED: CPT | Mod: 26,RT,, | Performed by: RADIOLOGY

## 2024-02-08 PROCEDURE — 76642 ULTRASOUND BREAST LIMITED: CPT | Mod: TC,RT

## 2024-02-12 ENCOUNTER — INFUSION (OUTPATIENT)
Dept: INFUSION THERAPY | Facility: HOSPITAL | Age: 69
End: 2024-02-12
Attending: INTERNAL MEDICINE
Payer: MEDICARE

## 2024-02-12 ENCOUNTER — OFFICE VISIT (OUTPATIENT)
Dept: HEMATOLOGY/ONCOLOGY | Facility: CLINIC | Age: 69
End: 2024-02-12
Payer: MEDICARE

## 2024-02-12 VITALS
RESPIRATION RATE: 17 BRPM | DIASTOLIC BLOOD PRESSURE: 91 MMHG | TEMPERATURE: 98 F | HEIGHT: 73 IN | HEART RATE: 59 BPM | BODY MASS INDEX: 28.18 KG/M2 | OXYGEN SATURATION: 100 % | WEIGHT: 212.63 LBS | SYSTOLIC BLOOD PRESSURE: 151 MMHG

## 2024-02-12 DIAGNOSIS — C34.01 LUNG CANCER, MAIN BRONCHUS, RIGHT: Primary | ICD-10-CM

## 2024-02-12 DIAGNOSIS — C34.91 SQUAMOUS CELL CARCINOMA OF RIGHT LUNG: ICD-10-CM

## 2024-02-12 DIAGNOSIS — C34.91 SQUAMOUS CELL CARCINOMA OF RIGHT LUNG: Primary | ICD-10-CM

## 2024-02-12 PROCEDURE — 25000003 PHARM REV CODE 250: Performed by: INTERNAL MEDICINE

## 2024-02-12 PROCEDURE — 1159F MED LIST DOCD IN RCRD: CPT | Mod: CPTII,S$GLB,, | Performed by: INTERNAL MEDICINE

## 2024-02-12 PROCEDURE — 1125F AMNT PAIN NOTED PAIN PRSNT: CPT | Mod: CPTII,S$GLB,, | Performed by: INTERNAL MEDICINE

## 2024-02-12 PROCEDURE — 96413 CHEMO IV INFUSION 1 HR: CPT

## 2024-02-12 PROCEDURE — 3077F SYST BP >= 140 MM HG: CPT | Mod: CPTII,S$GLB,, | Performed by: INTERNAL MEDICINE

## 2024-02-12 PROCEDURE — 3008F BODY MASS INDEX DOCD: CPT | Mod: CPTII,S$GLB,, | Performed by: INTERNAL MEDICINE

## 2024-02-12 PROCEDURE — 1101F PT FALLS ASSESS-DOCD LE1/YR: CPT | Mod: CPTII,S$GLB,, | Performed by: INTERNAL MEDICINE

## 2024-02-12 PROCEDURE — 3080F DIAST BP >= 90 MM HG: CPT | Mod: CPTII,S$GLB,, | Performed by: INTERNAL MEDICINE

## 2024-02-12 PROCEDURE — 99999 PR PBB SHADOW E&M-EST. PATIENT-LVL IV: CPT | Mod: PBBFAC,,, | Performed by: INTERNAL MEDICINE

## 2024-02-12 PROCEDURE — 3288F FALL RISK ASSESSMENT DOCD: CPT | Mod: CPTII,S$GLB,, | Performed by: INTERNAL MEDICINE

## 2024-02-12 PROCEDURE — 63600175 PHARM REV CODE 636 W HCPCS: Mod: JZ,JG | Performed by: INTERNAL MEDICINE

## 2024-02-12 PROCEDURE — 99215 OFFICE O/P EST HI 40 MIN: CPT | Mod: S$GLB,,, | Performed by: INTERNAL MEDICINE

## 2024-02-12 RX ORDER — HEPARIN 100 UNIT/ML
500 SYRINGE INTRAVENOUS
Status: DISCONTINUED | OUTPATIENT
Start: 2024-02-12 | End: 2024-02-12 | Stop reason: HOSPADM

## 2024-02-12 RX ORDER — SODIUM CHLORIDE 0.9 % (FLUSH) 0.9 %
10 SYRINGE (ML) INJECTION
Status: DISCONTINUED | OUTPATIENT
Start: 2024-02-12 | End: 2024-02-12 | Stop reason: HOSPADM

## 2024-02-12 RX ORDER — EPINEPHRINE 0.3 MG/.3ML
0.3 INJECTION SUBCUTANEOUS ONCE AS NEEDED
Status: DISCONTINUED | OUTPATIENT
Start: 2024-02-12 | End: 2024-02-12 | Stop reason: HOSPADM

## 2024-02-12 RX ORDER — EPINEPHRINE 0.3 MG/.3ML
0.3 INJECTION SUBCUTANEOUS ONCE AS NEEDED
Status: CANCELLED | OUTPATIENT
Start: 2024-02-12

## 2024-02-12 RX ORDER — DIPHENHYDRAMINE HYDROCHLORIDE 50 MG/ML
50 INJECTION INTRAMUSCULAR; INTRAVENOUS ONCE AS NEEDED
Status: CANCELLED | OUTPATIENT
Start: 2024-02-12

## 2024-02-12 RX ORDER — NALOXONE HYDROCHLORIDE 4 MG/.1ML
SPRAY NASAL
COMMUNITY
Start: 2024-01-10

## 2024-02-12 RX ORDER — DIPHENHYDRAMINE HYDROCHLORIDE 50 MG/ML
50 INJECTION INTRAMUSCULAR; INTRAVENOUS ONCE AS NEEDED
Status: DISCONTINUED | OUTPATIENT
Start: 2024-02-12 | End: 2024-02-12 | Stop reason: HOSPADM

## 2024-02-12 RX ORDER — GABAPENTIN 100 MG/1
100 CAPSULE ORAL 3 TIMES DAILY
COMMUNITY
Start: 2024-01-18

## 2024-02-12 RX ORDER — SODIUM CHLORIDE 0.9 % (FLUSH) 0.9 %
10 SYRINGE (ML) INJECTION
Status: CANCELLED | OUTPATIENT
Start: 2024-02-12

## 2024-02-12 RX ORDER — HEPARIN 100 UNIT/ML
500 SYRINGE INTRAVENOUS
Status: CANCELLED | OUTPATIENT
Start: 2024-02-12

## 2024-02-12 RX ADMIN — SODIUM CHLORIDE: 9 INJECTION, SOLUTION INTRAVENOUS at 11:02

## 2024-02-12 RX ADMIN — SODIUM CHLORIDE 1500 MG: 9 INJECTION, SOLUTION INTRAVENOUS at 11:02

## 2024-02-12 NOTE — PROGRESS NOTES
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  The mass abuts the right main pulmonary artery and right main stem bronchus.  Subcentimeter mediastinal lymph node noted.  One of LN slightly hypermetabolic with SUV of 3 and is located laterally.  Patchy hypermetabolism within the liver as suspected, however, there are few tiny focal areas of hypermetabolism out of proportion to the rest of the liver.  At least 1 of this corresponds to a low-density focus seen in the noncontrast exam.  CT 5/12/2023: changes consistent with COPD and emphysema in the lungs bilaterally with multiple bulla bleb seen. mass seen in the right hilum extending into the right upper lobe.  It is somewhat infiltrative.  This was seen on the prior PET-CT as well.  The findings are not significantly changed.  There are multiple associated satellite nodules in the right upper lobe.  Rough dimensions of the hilar component of the mass is 5.4 x 5.1 cm and rough measurements of the dominant lesion in the right upper lobe is 11 cm x 3.6 cm.  The mass extends into the right mainstem and right upper lobe bronchus.  This was seen on the prior examination as well.  There is narrowing of the right upper lobe bronchus there is some narrowing of the right upper lobe pulmonary arteries.  Findings are similar to the prior examination.  Scattered areas of punctate subcentimeter nodularity is seen in the right lower lobe.  These are  too small to characterize and similar changes were seen previously.    No pleural effusion is seen.  Thoracic aorta is normal in caliber.  There is some mediastinal lymphadenopathy seen.  Reference lymph node is measured in the precarinal region on image number 39 series 2 at 1.5 x 0.9 cm.  This is similar to the prior examination.  Scattered punctate areas of hypoattenuation are seen throughout the liver.  Similar changes were seen previously.  The lesions are too small to characterize but may represent cysts.  The largest lesion is seen in segment 4.  It does not enhance and measures 2 cm x 1.5 cm.  Findings may represent a cyst.  No adrenal nodules are seen.  CT Head  @ [facility] [place]. Mild chronic white matter changes. Old left caudate lobe lesion. 2. No evidence of metastatic disease.  CT C/A/P 9/15/2023:  1. Interval resolution of previously seen endobronchial right hilar mass.  2. Improved aeration of the right upper lobe.  There are residual but improved patchy irregular opacities in the right upper lobe, predominantly peripherally located  3. Interval improvement of mediastinal lymph nodes  4. Unchanged hepatic cyst and too small to characterize hepatic hypodensities  CT Chest 2024:  1. No detrimental change from previous exam.  2. Similar small mediastinal lymph nodes and patchy irregular opacities in the right upper lobe  3. Trace right pleural fluid, similar to previous.   Bilateral diagnostic bilateral mammogram with right breast US 2024: IMPRESSION: BENIGN  1. No mammographic evidence of malignancy in either breast.  No sonographic evidence of malignancy in the subareolar region of either breast.     2. Minimal gynecomastia in the bilateral breasts is benign.  The patient was encouraged to follow up with his primary care provider, as clinical correlation (including correlation with current medications, laboratory values, and physical examination) is recommended to determine an underlying  etiology.         Pathology:  02/10/2023:  Right upper lobe lung needle biopsy: Scattered highly atypical squamous cells, suspicious for squamous cell carcinoma in a background of abundant acute inflammation and necrosis  Right upper lobe, brushing:  Positive for malignant cells.  Squamous cell carcinoma, moderately differentiated.  Right upper lobe biopsy squamous cell carcinoma, moderately differentiated with focal keratinization.  Right lung washings positive for malignant cells.  Squamous cell carcinoma in a background of abundant necrosis.      CLINICAL HISTORY:       Patient: Deondre Ocampo IV is a 68 y.o. male.  Kindly referred for newly diagnosed squamous cell carcinoma of the lung.    He reports that he was being evaluated for a right side pain went imaging studies showed a mass in the lung.  I do not have the CTs.  But he eventually underwent bronchoscopy that showed squamous cell carcinoma.  PET-CT showed the right suprahilar mass with possible mediastinal lymphadenopathy and questionable liver lesions.  He is here to discuss further recommendations.    He is here with his wife.  Patient has history of 53 pack year smoking.  He reports that his breathing is fine.  He denies any chest pain, short of breath or coughing.  No hemoptysis.  He has an appointment to see the radiation oncologist next week.    Chief Complaint: Results      Interval History:  He completed 6 cycle of weekly Carbo/Taxol on 7/12/23 along with XRT. Restaging CT with GAURAV    Patient presents today in follow up to discuss mammogram and US results and for continuation of Imfinzi, C5 due today. He is accompanied by his wife. He continues with pain to right nipple.  No palpable mass, but tenderness with palpation. Mammogram and US on 2/8/24 were negative for malignancy. Overall, he is dong well.       Past Medical History:   Diagnosis Date    Diabetes mellitus     High cholesterol     Hypertension     Lung cancer       Past Surgical  History:   Procedure Laterality Date    BACK SURGERY      DEBRIDEMENT TENNIS ELBOW Right     PERIPHERALLY INSERTED CENTRAL CATHETER INSERTION Left 2023    Procedure: Insertion-picc;  Surgeon: Letha Silver MD;  Location: [facility];  Service: Oncology;  Laterality: Left;    right arm       Family History   Problem Relation Age of Onset    Coronary artery disease Mother     No Known Problems Father     Leukemia Daughter 42    Coronary artery disease Brother     Bone cancer Brother 35    Diabetes Brother     Heart failure Brother     Diabetes Brother     Leukemia Brother     Breast cancer Neg Hx         neg fam history of breast cancer     Social Connections: Not on file       Review of patient's allergies indicates:   Allergen Reactions    Ace inhibitors Swelling     Other reaction(s): Angioedema, Respiratory distress    Penicillins Itching and Rash      Current Outpatient Medications on File Prior to Visit   Medication Sig Dispense Refill    aspirin (ECOTRIN) 81 MG EC tablet Take 81 mg by mouth once daily.      benzonatate (TESSALON) 100 MG capsule Take 100 mg by mouth 3 (three) times daily as needed.      dicyclomine (BENTYL) 20 mg tablet Take 20 mg by mouth once daily. Once daily in the AM.      gabapentin (NEURONTIN) 100 MG capsule Take 100 mg by mouth 3 (three) times daily.      glimepiride (AMARYL) 2 MG tablet Take 2 mg by mouth before breakfast.      losartan (COZAAR) 100 MG tablet Take 100 mg by mouth once daily.      metoprolol tartrate (LOPRESSOR) 50 MG tablet Take 50 mg by mouth 2 (two) times daily.      naloxone (NARCAN) 4 mg/actuation Spry SMARTSIG:Both Nares      oxyCODONE-acetaminophen (PERCOCET)  mg per tablet Take 1 tablet by mouth every 6 (six) hours as needed for Pain. for pain. 30 tablet 0    pantoprazole (PROTONIX) 40 MG tablet Take 40 mg by mouth.      pravastatin (PRAVACHOL) 20 MG tablet Take 20 mg by mouth once daily.      TRULICITY 1.5 mg/0.5 mL pen injector SMARTSI  "pre-filled pen syringe SUB-Q Once a Week       No current facility-administered medications on file prior to visit.      Review of Systems   Constitutional:  Positive for fatigue. Negative for activity change, appetite change, chills, diaphoresis, fever and unexpected weight change.   HENT:  Negative for nasal congestion, mouth sores, nosebleeds, sinus pressure/congestion, sore throat and trouble swallowing.    Eyes: Negative.    Respiratory:  Positive for cough (chronic but better). Negative for shortness of breath.    Cardiovascular:  Negative for chest pain and palpitations.   Gastrointestinal:  Negative for abdominal distention, abdominal pain, blood in stool, change in bowel habit, constipation, diarrhea, nausea and vomiting.   Endocrine: Negative.    Genitourinary:  Negative for bladder incontinence, decreased urine volume, difficulty urinating, dysuria, frequency, hematuria and urgency.   Musculoskeletal:  Positive for back pain (same) and neck pain (same). Negative for arthralgias, gait problem, joint swelling, leg pain and myalgias.   Integumentary:  Negative for rash.        Right breast pain (nipple)   Allergic/Immunologic: Negative.    Neurological:  Negative for dizziness, tremors, syncope, weakness, light-headedness, numbness, headaches and memory loss.   Hematological:  Negative for adenopathy. Does not bruise/bleed easily.   Psychiatric/Behavioral:  Negative for agitation, confusion, hallucinations, sleep disturbance and suicidal ideas. The patient is not nervous/anxious.           Vitals:    02/12/24 0930   BP: (!) 151/91   BP Location: Left arm   Patient Position: Sitting   Pulse: (!) 59   Resp: 17   Temp: 98.2 °F (36.8 °C)   TempSrc: Oral   SpO2: 100%   Weight: 96.4 kg (212 lb 9.6 oz)   Height: 6' 1" (1.854 m)           Wt Readings from Last 6 Encounters:   02/12/24 96.4 kg (212 lb 9.6 oz)   02/08/24 94.8 kg (209 lb)   01/10/24 94.4 kg (208 lb 1.8 oz)   01/10/24 94.4 kg (208 lb 3.2 oz)   12/13/23 " 96.6 kg (213 lb)   11/15/23 93 kg (205 lb)        Body mass index is 28.05 kg/m².  Body surface area is 2.23 meters squared.  Physical Exam  Vitals and nursing note reviewed.   Constitutional:       General: He is not in acute distress.     Appearance: Normal appearance.   HENT:      Head: Normocephalic and atraumatic.      Mouth/Throat:      Mouth: Mucous membranes are moist.   Eyes:      General: No scleral icterus.     Extraocular Movements: Extraocular movements intact.      Conjunctiva/sclera: Conjunctivae normal.   Neck:      Vascular: No JVD.   Cardiovascular:      Rate and Rhythm: Normal rate and regular rhythm.      Heart sounds: No murmur heard.  Pulmonary:      Effort: Pulmonary effort is normal.      Breath sounds: Decreased breath sounds present. No wheezing or rhonchi.   Chest:   Breasts:     Right: Tenderness present. No swelling, bleeding, inverted nipple, mass, nipple discharge or skin change.      Left: Normal.      Comments: Tenderness over the right nipple but no masses palpated  Abdominal:      General: Bowel sounds are normal. There is no distension.      Palpations: Abdomen is soft.      Tenderness: There is no abdominal tenderness.   Musculoskeletal:         General: No swelling or deformity.      Cervical back: Neck supple.   Lymphadenopathy:      Head:      Right side of head: No submandibular adenopathy.      Left side of head: No submandibular adenopathy.      Cervical: No cervical adenopathy.      Upper Body:      Right upper body: No supraclavicular or axillary adenopathy.      Left upper body: No supraclavicular or axillary adenopathy.      Lower Body: No right inguinal adenopathy. No left inguinal adenopathy.   Skin:     General: Skin is warm.      Coloration: Skin is not jaundiced.      Findings: No rash.   Neurological:      Mental Status: He is alert and oriented to person, place, and time.      Cranial Nerves: Cranial nerves 2-12 are intact.   Psychiatric:         Attention and  Perception: Attention normal.         Behavior: Behavior is cooperative.       ECOG SCORE    1 - Restricted in strenuous activity-ambulatory and able to carry out work of a light nature         Laboratory:  CBC with Differential:  Lab Results   Component Value Date    WBC 4.45 (L) 02/12/2024    RBC 3.68 (L) 02/12/2024    HGB 11.8 (L) 02/12/2024    HCT 36.6 (L) 02/12/2024    MCV 99.5 (H) 02/12/2024    MCH 32.1 (H) 02/12/2024    MCHC 32.2 (L) 02/12/2024    RDW 15.3 02/12/2024     02/12/2024    MPV 8.8 02/12/2024        CMP:  Sodium Level   Date Value Ref Range Status   02/12/2024 141 136 - 145 mmol/L Final     Potassium Level   Date Value Ref Range Status   02/12/2024 4.1 3.5 - 5.1 mmol/L Final     Carbon Dioxide   Date Value Ref Range Status   02/12/2024 30 23 - 31 mmol/L Final     Blood Urea Nitrogen   Date Value Ref Range Status   02/12/2024 8.9 8.4 - 25.7 mg/dL Final     Creatinine   Date Value Ref Range Status   02/12/2024 0.91 0.73 - 1.18 mg/dL Final     Calcium Level Total   Date Value Ref Range Status   02/12/2024 9.2 8.8 - 10.0 mg/dL Final     Albumin Level   Date Value Ref Range Status   02/12/2024 3.4 3.4 - 4.8 g/dL Final     Bilirubin Total   Date Value Ref Range Status   02/12/2024 0.4 <=1.5 mg/dL Final     Alkaline Phosphatase   Date Value Ref Range Status   02/12/2024 72 40 - 150 unit/L Final     Aspartate Aminotransferase   Date Value Ref Range Status   02/12/2024 7 5 - 34 unit/L Final     Alanine Aminotransferase   Date Value Ref Range Status   02/12/2024 <5 0 - 55 unit/L Final         Assessment:       1) cT4N2?Mx- Stage III Squamous cell carcinoma of the right upper lobe  -Large hilar mass that extends to the RUL  -Completed 6 cycle of weekly Carbo/Taxol on 7/12/23 along with XRT.   -Restaging CT with GAURAV  -Surgical reevaluation -- no surgery recommended as restaging scan with GAURAV  -maintenance immunotherapy (Durvalumab)-11/15/2023-present    2) Liver hypodensities--> suggesting cysts       Plan:       Okay to proceed today with maintenance immunotherapy (Durvalumab) x 1 year - okay to administer peripherally  Continue follow ups with Dr. Hoff  CT C/A/P every 3 months - due 4/2024, will order when closer   RTC in 4 weeks with MD for TD/labs/infusion same day - he lives in [place] - pt requests appointments @ 11 am or 1 pm  Labs: CBC, CMP, TSH - standing order placed    Encouraged to call with questions or problems  The patient was given ample opportunity to ask questions and they were all answered to satisfaction; patient demonstrated understanding of what we discussed and is agreeable to the plan.    Letha Silver MD  Hematology/Oncology      Professional Services   I, Jenelle Waters LPN, acted solely as a scribe for and in the presence of Dr. eLtha Silver, who performed these services.

## 2024-03-11 ENCOUNTER — INFUSION (OUTPATIENT)
Dept: INFUSION THERAPY | Facility: HOSPITAL | Age: 69
End: 2024-03-11
Attending: INTERNAL MEDICINE
Payer: MEDICARE

## 2024-03-11 ENCOUNTER — OFFICE VISIT (OUTPATIENT)
Dept: HEMATOLOGY/ONCOLOGY | Facility: CLINIC | Age: 69
End: 2024-03-11
Payer: MEDICARE

## 2024-03-11 VITALS
DIASTOLIC BLOOD PRESSURE: 82 MMHG | SYSTOLIC BLOOD PRESSURE: 135 MMHG | HEIGHT: 73 IN | HEART RATE: 60 BPM | RESPIRATION RATE: 18 BRPM | OXYGEN SATURATION: 99 % | BODY MASS INDEX: 28.07 KG/M2 | WEIGHT: 211.81 LBS | TEMPERATURE: 98 F

## 2024-03-11 DIAGNOSIS — E03.2 DRUG-INDUCED HYPOTHYROIDISM: ICD-10-CM

## 2024-03-11 DIAGNOSIS — C34.11 MALIGNANT NEOPLASM OF UPPER LOBE OF RIGHT LUNG: ICD-10-CM

## 2024-03-11 DIAGNOSIS — C34.91 SQUAMOUS CELL CARCINOMA OF RIGHT LUNG: Primary | ICD-10-CM

## 2024-03-11 PROCEDURE — 1126F AMNT PAIN NOTED NONE PRSNT: CPT | Mod: CPTII,S$GLB,, | Performed by: NURSE PRACTITIONER

## 2024-03-11 PROCEDURE — 3288F FALL RISK ASSESSMENT DOCD: CPT | Mod: CPTII,S$GLB,, | Performed by: NURSE PRACTITIONER

## 2024-03-11 PROCEDURE — 3079F DIAST BP 80-89 MM HG: CPT | Mod: CPTII,S$GLB,, | Performed by: NURSE PRACTITIONER

## 2024-03-11 PROCEDURE — 63600175 PHARM REV CODE 636 W HCPCS: Mod: JZ,JG | Performed by: NURSE PRACTITIONER

## 2024-03-11 PROCEDURE — 96413 CHEMO IV INFUSION 1 HR: CPT

## 2024-03-11 PROCEDURE — 1101F PT FALLS ASSESS-DOCD LE1/YR: CPT | Mod: CPTII,S$GLB,, | Performed by: NURSE PRACTITIONER

## 2024-03-11 PROCEDURE — 1159F MED LIST DOCD IN RCRD: CPT | Mod: CPTII,S$GLB,, | Performed by: NURSE PRACTITIONER

## 2024-03-11 PROCEDURE — 1160F RVW MEDS BY RX/DR IN RCRD: CPT | Mod: CPTII,S$GLB,, | Performed by: NURSE PRACTITIONER

## 2024-03-11 PROCEDURE — 25000003 PHARM REV CODE 250: Performed by: NURSE PRACTITIONER

## 2024-03-11 PROCEDURE — 99999 PR PBB SHADOW E&M-EST. PATIENT-LVL IV: CPT | Mod: PBBFAC,,, | Performed by: NURSE PRACTITIONER

## 2024-03-11 PROCEDURE — 99215 OFFICE O/P EST HI 40 MIN: CPT | Mod: S$GLB,,, | Performed by: NURSE PRACTITIONER

## 2024-03-11 PROCEDURE — 3008F BODY MASS INDEX DOCD: CPT | Mod: CPTII,S$GLB,, | Performed by: NURSE PRACTITIONER

## 2024-03-11 PROCEDURE — 3075F SYST BP GE 130 - 139MM HG: CPT | Mod: CPTII,S$GLB,, | Performed by: NURSE PRACTITIONER

## 2024-03-11 RX ORDER — SODIUM CHLORIDE 0.9 % (FLUSH) 0.9 %
10 SYRINGE (ML) INJECTION
Status: DISCONTINUED | OUTPATIENT
Start: 2024-03-11 | End: 2024-03-11 | Stop reason: HOSPADM

## 2024-03-11 RX ORDER — SODIUM CHLORIDE 0.9 % (FLUSH) 0.9 %
10 SYRINGE (ML) INJECTION
Status: CANCELLED | OUTPATIENT
Start: 2024-03-11

## 2024-03-11 RX ORDER — HEPARIN 100 UNIT/ML
500 SYRINGE INTRAVENOUS
Status: DISCONTINUED | OUTPATIENT
Start: 2024-03-11 | End: 2024-03-11 | Stop reason: HOSPADM

## 2024-03-11 RX ORDER — DIPHENHYDRAMINE HYDROCHLORIDE 50 MG/ML
50 INJECTION INTRAMUSCULAR; INTRAVENOUS ONCE AS NEEDED
Status: DISCONTINUED | OUTPATIENT
Start: 2024-03-11 | End: 2024-03-11 | Stop reason: HOSPADM

## 2024-03-11 RX ORDER — EPINEPHRINE 0.3 MG/.3ML
0.3 INJECTION SUBCUTANEOUS ONCE AS NEEDED
Status: DISCONTINUED | OUTPATIENT
Start: 2024-03-11 | End: 2024-03-11 | Stop reason: HOSPADM

## 2024-03-11 RX ORDER — HEPARIN 100 UNIT/ML
500 SYRINGE INTRAVENOUS
Status: CANCELLED | OUTPATIENT
Start: 2024-03-11

## 2024-03-11 RX ORDER — DIPHENHYDRAMINE HYDROCHLORIDE 50 MG/ML
50 INJECTION INTRAMUSCULAR; INTRAVENOUS ONCE AS NEEDED
Status: CANCELLED | OUTPATIENT
Start: 2024-03-11

## 2024-03-11 RX ORDER — EPINEPHRINE 0.3 MG/.3ML
0.3 INJECTION SUBCUTANEOUS ONCE AS NEEDED
Status: CANCELLED | OUTPATIENT
Start: 2024-03-11

## 2024-03-11 RX ADMIN — SODIUM CHLORIDE 1500 MG: 9 INJECTION, SOLUTION INTRAVENOUS at 11:03

## 2024-03-11 RX ADMIN — SODIUM CHLORIDE: 9 INJECTION, SOLUTION INTRAVENOUS at 11:03

## 2024-03-11 NOTE — PROGRESS NOTES
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  The mass abuts the right main pulmonary artery and right main stem bronchus.  Subcentimeter mediastinal lymph node noted.  One of LN slightly hypermetabolic with SUV of 3 and is located laterally.  Patchy hypermetabolism within the liver as suspected, however, there are few tiny focal areas of hypermetabolism out of proportion to the rest of the liver.  At least 1 of this corresponds to a low-density focus seen in the noncontrast exam.  CT 5/12/2023: changes consistent with COPD and emphysema in the lungs bilaterally with multiple bulla bleb seen. mass seen in the right hilum extending into the right upper lobe.  It is somewhat infiltrative.  This was seen on the prior PET-CT as well.  The findings are not significantly changed.  There are multiple associated satellite nodules in the right upper lobe.  Rough dimensions of the hilar component of the mass is 5.4 x 5.1 cm and rough measurements of the dominant lesion in the right upper lobe is 11 cm x 3.6 cm.  The mass extends into the right mainstem and right upper lobe bronchus.  This was seen on the prior examination as well.  There is narrowing of the right upper lobe bronchus there is some narrowing of the right upper lobe pulmonary arteries.  Findings are similar to the prior examination.  Scattered areas of punctate subcentimeter nodularity is seen in the right lower lobe.  These are  too small to characterize and similar changes were seen previously.    No pleural effusion is seen.  Thoracic aorta is normal in caliber.  There is some mediastinal lymphadenopathy seen.  Reference lymph node is measured in the precarinal region on image number 39 series 2 at 1.5 x 0.9 cm.  This is similar to the prior examination.  Scattered punctate areas of hypoattenuation are seen throughout the liver.  Similar changes were seen previously.  The lesions are too small to characterize but may represent cysts.  The largest lesion is seen in segment 4.  It does not enhance and measures 2 cm x 1.5 cm.  Findings may represent a cyst.  No adrenal nodules are seen.  CT Head  @ [facility] Mild chronic white matter changes. Old left caudate lobe lesion. 2. No evidence of metastatic disease.  CT C/A/P 9/15/2023:  1. Interval resolution of previously seen endobronchial right hilar mass.  2. Improved aeration of the right upper lobe.  There are residual but improved patchy irregular opacities in the right upper lobe, predominantly peripherally located  3. Interval improvement of mediastinal lymph nodes  4. Unchanged hepatic cyst and too small to characterize hepatic hypodensities  CT Chest 2024:  1. No detrimental change from previous exam.  2. Similar small mediastinal lymph nodes and patchy irregular opacities in the right upper lobe  3. Trace right pleural fluid, similar to previous.   Bilateral diagnostic bilateral mammogram with right breast US 2024: IMPRESSION: BENIGN  1. No mammographic evidence of malignancy in either breast.  No sonographic evidence of malignancy in the subareolar region of either breast.     2. Minimal gynecomastia in the bilateral breasts is benign.  The patient was encouraged to follow up with his primary care provider, as clinical correlation (including correlation with current medications, laboratory values, and physical examination) is recommended to determine an underlying  etiology.         Pathology:  02/10/2023:  Right upper lobe lung needle biopsy: Scattered highly atypical squamous cells, suspicious for squamous cell carcinoma in a background of abundant acute inflammation and necrosis  Right upper lobe, brushing:  Positive for malignant cells.  Squamous cell carcinoma, moderately differentiated.  Right upper lobe biopsy squamous cell carcinoma, moderately differentiated with focal keratinization.  Right lung washings positive for malignant cells.  Squamous cell carcinoma in a background of abundant necrosis.      CLINICAL HISTORY:       Patient: Deondre Ocampo IV is a 68 y.o. male.  Kindly referred for newly diagnosed squamous cell carcinoma of the lung.    He reports that he was being evaluated for a right side pain went imaging studies showed a mass in the lung.  I do not have the CTs.  But he eventually underwent bronchoscopy that showed squamous cell carcinoma.  PET-CT showed the right suprahilar mass with possible mediastinal lymphadenopathy and questionable liver lesions.  He is here to discuss further recommendations.    He is here with his wife.  Patient has history of 53 pack year smoking.  He reports that his breathing is fine.  He denies any chest pain, short of breath or coughing.  No hemoptysis.  He has an appointment to see the radiation oncologist next week.    Chief Complaint: No chief complaint on file.      Interval History:  He completed 6 cycle of weekly Carbo/Taxol on 7/12/23 along with XRT.     Patient presents today for lab and visit currently on  Imfinzi, C5 due today. He is accompanied by his wife. He continues with back pain. He has had multiple back surgeries in the past.     Past Medical History:   Diagnosis Date    Diabetes mellitus     High cholesterol     Hypertension     Lung cancer       Past Surgical History:   Procedure Laterality Date    BACK SURGERY      DEBRIDEMENT TENNIS ELBOW Right     PERIPHERALLY INSERTED CENTRAL CATHETER INSERTION  Left 2023    Procedure: Insertion-picc;  Surgeon: Letha Silver MD;  Location: [facility];  Service: Oncology;  Laterality: Left;    right arm       Family History   Problem Relation Age of Onset    Coronary artery disease Mother     No Known Problems Father     Leukemia Daughter 42    Coronary artery disease Brother     Bone cancer Brother 35    Diabetes Brother     Heart failure Brother     Diabetes Brother     Leukemia Brother     Breast cancer Neg Hx         neg fam history of breast cancer     Social Connections: Not on file       Review of patient's allergies indicates:   Allergen Reactions    Ace inhibitors Swelling     Other reaction(s): Angioedema, Respiratory distress    Penicillins Itching and Rash      Current Outpatient Medications on File Prior to Visit   Medication Sig Dispense Refill    aspirin (ECOTRIN) 81 MG EC tablet Take 81 mg by mouth once daily.      benzonatate (TESSALON) 100 MG capsule Take 100 mg by mouth 3 (three) times daily as needed.      dicyclomine (BENTYL) 20 mg tablet Take 20 mg by mouth once daily. Once daily in the AM.      gabapentin (NEURONTIN) 100 MG capsule Take 100 mg by mouth 3 (three) times daily.      glimepiride (AMARYL) 2 MG tablet Take 2 mg by mouth before breakfast.      losartan (COZAAR) 100 MG tablet Take 100 mg by mouth once daily.      metoprolol tartrate (LOPRESSOR) 50 MG tablet Take 50 mg by mouth 2 (two) times daily.      naloxone (NARCAN) 4 mg/actuation Spry SMARTSIG:Both Nares      oxyCODONE-acetaminophen (PERCOCET)  mg per tablet Take 1 tablet by mouth every 6 (six) hours as needed for Pain. for pain. 30 tablet 0    pantoprazole (PROTONIX) 40 MG tablet Take 40 mg by mouth.      pravastatin (PRAVACHOL) 20 MG tablet Take 20 mg by mouth once daily.      TRULICITY 1.5 mg/0.5 mL pen injector SMARTSI pre-filled pen syringe SUB-Q Once a Week       No current facility-administered medications on file prior to visit.      Review of Systems    Constitutional:  Positive for fatigue. Negative for activity change, appetite change, chills, diaphoresis, fever and unexpected weight change.   HENT:  Negative for nasal congestion, mouth sores, nosebleeds, sinus pressure/congestion, sore throat and trouble swallowing.    Eyes: Negative.    Respiratory:  Positive for cough (chronic but better). Negative for shortness of breath.    Cardiovascular:  Negative for chest pain and palpitations.   Gastrointestinal:  Negative for abdominal distention, abdominal pain, blood in stool, change in bowel habit, constipation, diarrhea, nausea and vomiting.   Endocrine: Negative.    Genitourinary:  Negative for bladder incontinence, decreased urine volume, difficulty urinating, dysuria, frequency, hematuria and urgency.   Musculoskeletal:  Positive for back pain (same) and neck pain (same). Negative for arthralgias, gait problem, joint swelling, leg pain and myalgias.   Integumentary:  Negative for rash.        Right breast pain (nipple)   Allergic/Immunologic: Negative.    Neurological:  Negative for dizziness, tremors, syncope, weakness, light-headedness, numbness, headaches and memory loss.   Hematological:  Negative for adenopathy. Does not bruise/bleed easily.   Psychiatric/Behavioral:  Negative for agitation, confusion, hallucinations, sleep disturbance and suicidal ideas. The patient is not nervous/anxious.           There were no vitals filed for this visit.          Wt Readings from Last 6 Encounters:   02/12/24 96.4 kg (212 lb 9.6 oz)   02/08/24 94.8 kg (209 lb)   01/10/24 94.4 kg (208 lb 1.8 oz)   01/10/24 94.4 kg (208 lb 3.2 oz)   12/13/23 96.6 kg (213 lb)   11/15/23 93 kg (205 lb)        There is no height or weight on file to calculate BMI.  There is no height or weight on file to calculate BSA.  Physical Exam  Vitals and nursing note reviewed.   Constitutional:       General: He is not in acute distress.     Appearance: Normal appearance.   HENT:      Head:  Normocephalic and atraumatic.      Mouth/Throat:      Mouth: Mucous membranes are moist.   Eyes:      General: No scleral icterus.     Extraocular Movements: Extraocular movements intact.      Conjunctiva/sclera: Conjunctivae normal.   Neck:      Vascular: No JVD.   Cardiovascular:      Rate and Rhythm: Normal rate and regular rhythm.      Heart sounds: No murmur heard.  Pulmonary:      Effort: Pulmonary effort is normal.      Breath sounds: Decreased breath sounds present. No wheezing or rhonchi.   Chest:   Breasts:     Right: No swelling, bleeding, inverted nipple, mass, nipple discharge or skin change.      Left: Normal.   Abdominal:      General: Bowel sounds are normal. There is no distension.      Palpations: Abdomen is soft.      Tenderness: There is no abdominal tenderness.   Musculoskeletal:         General: No swelling or deformity.      Cervical back: Neck supple.   Lymphadenopathy:      Head:      Right side of head: No submandibular adenopathy.      Left side of head: No submandibular adenopathy.      Cervical: No cervical adenopathy.      Upper Body:      Right upper body: No supraclavicular or axillary adenopathy.      Left upper body: No supraclavicular or axillary adenopathy.      Lower Body: No right inguinal adenopathy. No left inguinal adenopathy.   Skin:     General: Skin is warm.      Coloration: Skin is not jaundiced.      Findings: No rash.   Neurological:      Mental Status: He is alert and oriented to person, place, and time.      Cranial Nerves: Cranial nerves 2-12 are intact.   Psychiatric:         Attention and Perception: Attention normal.         Behavior: Behavior is cooperative.     ECOG SCORE             Laboratory:  CBC with Differential:  Lab Results   Component Value Date    WBC 4.45 (L) 02/12/2024    RBC 3.68 (L) 02/12/2024    HGB 11.8 (L) 02/12/2024    HCT 36.6 (L) 02/12/2024    MCV 99.5 (H) 02/12/2024    MCH 32.1 (H) 02/12/2024    MCHC 32.2 (L) 02/12/2024    RDW 15.3  02/12/2024     02/12/2024    MPV 8.8 02/12/2024        CMP:  Sodium Level   Date Value Ref Range Status   02/12/2024 141 136 - 145 mmol/L Final     Potassium Level   Date Value Ref Range Status   02/12/2024 4.1 3.5 - 5.1 mmol/L Final     Carbon Dioxide   Date Value Ref Range Status   02/12/2024 30 23 - 31 mmol/L Final     Blood Urea Nitrogen   Date Value Ref Range Status   02/12/2024 8.9 8.4 - 25.7 mg/dL Final     Creatinine   Date Value Ref Range Status   02/12/2024 0.91 0.73 - 1.18 mg/dL Final     Calcium Level Total   Date Value Ref Range Status   02/12/2024 9.2 8.8 - 10.0 mg/dL Final     Albumin Level   Date Value Ref Range Status   02/12/2024 3.4 3.4 - 4.8 g/dL Final     Bilirubin Total   Date Value Ref Range Status   02/12/2024 0.4 <=1.5 mg/dL Final     Alkaline Phosphatase   Date Value Ref Range Status   02/12/2024 72 40 - 150 unit/L Final     Aspartate Aminotransferase   Date Value Ref Range Status   02/12/2024 7 5 - 34 unit/L Final     Alanine Aminotransferase   Date Value Ref Range Status   02/12/2024 <5 0 - 55 unit/L Final         Assessment:       1) cT4N2?Mx- Stage III Squamous cell carcinoma of the right upper lobe  -Large hilar mass that extends to the RUL  -Completed 6 cycle of weekly Carbo/Taxol on 7/12/23 along with XRT.   -Restaging CT with GAURAV  -Surgical reevaluation -- no surgery recommended as restaging scan with GAURAV  -maintenance immunotherapy (Durvalumab)-11/15/2023-present    2) Liver hypodensities--> suggesting cysts      Plan:       Okay to proceed today with maintenance immunotherapy (Durvalumab) x 1 year - okay to administer peripherally  Continue follow ups with Dr. Hoff  CT C/A/P every 3 months - due 4/2024, ordered.   RTC in 4 weeks with MD for TD/labs/infusion same day - he lives in [place] - pt requests appointments @ 11 am or 1 pm  Labs: CBC, CMP, TSH - standing order placed    Encouraged to call with questions or problems  The patient was given ample opportunity to  ask questions and they were all answered to satisfaction; patient demonstrated understanding of what we discussed and is agreeable to the plan.    JYOTHI Gay

## 2024-03-11 NOTE — PLAN OF CARE
Patient received C5D1, tolerated well.    Upper Respiratory Infection in Children    AMBULATORY CARE:    An upper respiratory infection is also called a common cold. It can affect your child's nose, throat, ears, and sinuses. Most children get about 5 to 8 colds each year.     Common signs and symptoms include the following: Your child's cold symptoms will be worst for the first 3 to 5 days. Your child may have any of the following:     Runny or stuffy nose  Sneezing and coughing  Sore throat or hoarseness  Red, watery, and sore eyes  Tiredness or fussiness  Chills and a fever that usually lasts 1 to 3 days  Headache, body aches, or sore muscles    Seek care immediately if:     Your child's temperature reaches 105°F (40.6°C).  Your child has trouble breathing or is breathing faster than usual.   Your child's lips or nails turn blue.   Your child's nostrils flare when he or she takes a breath.   The skin above or below your child's ribs is sucked in with each breath.   Your child's heart is beating much faster than usual.   You see pinpoint or larger reddish-purple dots on your child's skin.   Your child stops urinating or urinates less than usual.   Your baby's soft spot on his or her head is bulging outward or sunken inward.   Your child has a severe headache or stiff neck.   Your child has chest or stomach pain.   Your baby is too weak to eat.     Contact your child's healthcare provider if:     Your child has a rectal, ear, or forehead temperature higher than 100.4°F (38°C).   Your child has an oral or pacifier temperature higher than 100°F (37.8°C).  Your child has an armpit temperature higher than 99°F (37.2°C).  Your child is younger than 2 years and has a fever for more than 24 hours.   Your child is 2 years or older and has a fever for more than 72 hours.   Your child has had thick nasal drainage for more than 2 days.   Your child has ear pain.   Your child has white spots on his or her tonsils.   Your child coughs up a lot of thick, yellow, or green mucus.   Your child is unable to eat, has nausea, or is vomiting.   Your child has increased tiredness and weakness.  Your child's symptoms do not improve or get worse within 3 days.   You have questions or concerns about your child's condition or care.    Treatment for your child's cold: There is no cure for the common cold. Colds are caused by viruses and do not get better with antibiotics. Most colds in children go away without treatment in 1 to 2 weeks. Do not give over-the-counter (OTC) cough or cold medicines to children younger than 4 years. Your child's healthcare provider may tell you not to give these medicines to children younger than 6 years. OTC cough and cold medicines can cause side effects that may harm your child. Your child may need any of the following to help manage his or her symptoms:     Over the counter Cough suppressants and Decongestants have not been shown to be effective in children. please consult with your physician before giving them to your child.    Acetaminophen decreases pain and fever. It is available without a doctor's order. Ask how much to give your child and how often to give it. Follow directions. Read the labels of all other medicines your child uses to see if they also contain acetaminophen, or ask your child's doctor or pharmacist. Acetaminophen can cause liver damage if not taken correctly.    NSAIDs, such as ibuprofen, help decrease swelling, pain, and fever. This medicine is available with or without a doctor's order. NSAIDs can cause stomach bleeding or kidney problems in certain people. If your child takes blood thinner medicine, always ask if NSAIDs are safe for him. Always read the medicine label and follow directions. Do not give these medicines to children under 6 months of age without direction from your child's healthcare provider.    Do not give aspirin to children under 18 years of age. Your child could develop Reye syndrome if he takes aspirin. Reye syndrome can cause life-threatening brain and liver damage. Check your child's medicine labels for aspirin, salicylates, or oil of wintergreen.     Give your child's medicine as directed. Contact your child's healthcare provider if you think the medicine is not working as expected. Tell him or her if your child is allergic to any medicine. Keep a current list of the medicines, vitamins, and herbs your child takes. Include the amounts, and when, how, and why they are taken. Bring the list or the medicines in their containers to follow-up visits. Carry your child's medicine list with you in case of an emergency.    Care for your child:     Have your child rest. Rest will help his or her body get better.     Give your child more liquids as directed. Liquids will help thin and loosen mucus so your child can cough it up. Liquids will also help prevent dehydration. Liquids that help prevent dehydration include water, fruit juice, and broth. Do not give your child liquids that contain caffeine. Caffeine can increase your child's risk for dehydration. Ask your child's healthcare provider how much liquid to give your child each day.     Clear mucus from your child's nose. Use a bulb syringe to remove mucus from a baby's nose. Squeeze the bulb and put the tip into one of your baby's nostrils. Gently close the other nostril with your finger. Slowly release the bulb to suck up the mucus. Empty the bulb syringe onto a tissue. Repeat the steps if needed. Do the same thing in the other nostril. Make sure your baby's nose is clear before he or she feeds or sleeps. Your child's healthcare provider may recommend you put saline drops into your baby's nose if the mucus is very thick.     Soothe your child's throat. If your child is 8 years or older, have him or her gargle with salt water. Make salt water by dissolving ¼ teaspoon salt in 1 cup warm water.     Soothe your child's cough. You can give honey to children older than 1 year. Give ½ teaspoon of honey to children 1 to 5 years. Give 1 teaspoon of honey to children 6 to 11 years. Give 2 teaspoons of honey to children 12 or older.    Use a cool-mist humidifier. This will add moisture to the air and help your child breathe easier. Make sure the humidifier is out of your child's reach.    Apply petroleum-based jelly around the outside of your child's nostrils. This can decrease irritation from blowing his or her nose.     Keep your child away from smoke. Do not smoke near your child. Do not let your older child smoke. Nicotine and other chemicals in cigarettes and cigars can make your child's symptoms worse. They can also cause infections such as bronchitis or pneumonia. Ask your child's healthcare provider for information if you or your child currently smoke and need help to quit. E-cigarettes or smokeless tobacco still contain nicotine. Talk to your healthcare provider before you or your child use these products.     Prevent the spread of a cold:     Keep your child away from other people during the first 3 to 5 days of his or her cold. The virus is spread most easily during this time.     Wash your hands and your child's hands often. Teach your child to cover his or her nose and mouth when he or she sneezes, coughs, and blows his or her nose. Show your child how to cough and sneeze into the crook of the elbow instead of the hands.      Do not let your child share toys, pacifiers, or towels with others while he or she is sick.     Do not let your child share foods, eating utensils, cups, or drinks with others while he or she is sick.    Follow up with your child's PMD or healthcare provider as directed: Write down your questions so you remember to ask them during your child's visits.

## 2024-04-04 ENCOUNTER — HOSPITAL ENCOUNTER (OUTPATIENT)
Dept: RADIOLOGY | Facility: HOSPITAL | Age: 69
Discharge: HOME | End: 2024-04-04
Attending: NURSE PRACTITIONER
Payer: MEDICARE

## 2024-04-04 DIAGNOSIS — C34.11 MALIGNANT NEOPLASM OF UPPER LOBE OF RIGHT LUNG: ICD-10-CM

## 2024-04-04 DIAGNOSIS — C34.91 SQUAMOUS CELL CARCINOMA OF RIGHT LUNG: ICD-10-CM

## 2024-04-04 PROCEDURE — 25500020 PHARM REV CODE 255: Performed by: NURSE PRACTITIONER

## 2024-04-04 PROCEDURE — 74177 CT ABD & PELVIS W/CONTRAST: CPT | Mod: TC

## 2024-04-04 RX ADMIN — DIATRIZOATE MEGLUMINE AND DIATRIZOATE SODIUM 30 ML: 600; 100 SOLUTION ORAL; RECTAL at 11:04

## 2024-04-04 RX ADMIN — IOPAMIDOL 100 ML: 755 INJECTION, SOLUTION INTRAVENOUS at 12:04

## 2024-04-08 ENCOUNTER — OFFICE VISIT (OUTPATIENT)
Dept: HEMATOLOGY/ONCOLOGY | Facility: CLINIC | Age: 69
End: 2024-04-08
Payer: MEDICARE

## 2024-04-08 ENCOUNTER — INFUSION (OUTPATIENT)
Dept: INFUSION THERAPY | Facility: HOSPITAL | Age: 69
End: 2024-04-08
Attending: INTERNAL MEDICINE
Payer: MEDICARE

## 2024-04-08 VITALS
SYSTOLIC BLOOD PRESSURE: 137 MMHG | TEMPERATURE: 98 F | OXYGEN SATURATION: 95 % | WEIGHT: 218 LBS | BODY MASS INDEX: 28.89 KG/M2 | HEART RATE: 55 BPM | RESPIRATION RATE: 18 BRPM | HEIGHT: 73 IN | DIASTOLIC BLOOD PRESSURE: 77 MMHG

## 2024-04-08 DIAGNOSIS — C34.91 SQUAMOUS CELL CARCINOMA OF RIGHT LUNG: Primary | ICD-10-CM

## 2024-04-08 DIAGNOSIS — Z51.12 MAINTENANCE ANTINEOPLASTIC IMMUNOTHERAPY: ICD-10-CM

## 2024-04-08 PROCEDURE — 63600175 PHARM REV CODE 636 W HCPCS: Mod: JZ,JG | Performed by: INTERNAL MEDICINE

## 2024-04-08 PROCEDURE — 3288F FALL RISK ASSESSMENT DOCD: CPT | Mod: CPTII,S$GLB,, | Performed by: INTERNAL MEDICINE

## 2024-04-08 PROCEDURE — 25000003 PHARM REV CODE 250: Performed by: INTERNAL MEDICINE

## 2024-04-08 PROCEDURE — 1159F MED LIST DOCD IN RCRD: CPT | Mod: CPTII,S$GLB,, | Performed by: INTERNAL MEDICINE

## 2024-04-08 PROCEDURE — 1100F PTFALLS ASSESS-DOCD GE2>/YR: CPT | Mod: CPTII,S$GLB,, | Performed by: INTERNAL MEDICINE

## 2024-04-08 PROCEDURE — 3008F BODY MASS INDEX DOCD: CPT | Mod: CPTII,S$GLB,, | Performed by: INTERNAL MEDICINE

## 2024-04-08 PROCEDURE — 99215 OFFICE O/P EST HI 40 MIN: CPT | Mod: S$GLB,,, | Performed by: INTERNAL MEDICINE

## 2024-04-08 PROCEDURE — 3078F DIAST BP <80 MM HG: CPT | Mod: CPTII,S$GLB,, | Performed by: INTERNAL MEDICINE

## 2024-04-08 PROCEDURE — 1125F AMNT PAIN NOTED PAIN PRSNT: CPT | Mod: CPTII,S$GLB,, | Performed by: INTERNAL MEDICINE

## 2024-04-08 PROCEDURE — 96413 CHEMO IV INFUSION 1 HR: CPT

## 2024-04-08 PROCEDURE — 3075F SYST BP GE 130 - 139MM HG: CPT | Mod: CPTII,S$GLB,, | Performed by: INTERNAL MEDICINE

## 2024-04-08 PROCEDURE — 99999 PR PBB SHADOW E&M-EST. PATIENT-LVL IV: CPT | Mod: PBBFAC,,, | Performed by: INTERNAL MEDICINE

## 2024-04-08 RX ORDER — EPINEPHRINE 0.3 MG/.3ML
0.3 INJECTION SUBCUTANEOUS ONCE AS NEEDED
Status: CANCELLED | OUTPATIENT
Start: 2024-04-08

## 2024-04-08 RX ORDER — DIPHENHYDRAMINE HYDROCHLORIDE 50 MG/ML
50 INJECTION INTRAMUSCULAR; INTRAVENOUS ONCE AS NEEDED
Status: CANCELLED | OUTPATIENT
Start: 2024-04-08

## 2024-04-08 RX ORDER — SODIUM CHLORIDE 0.9 % (FLUSH) 0.9 %
10 SYRINGE (ML) INJECTION
Status: CANCELLED | OUTPATIENT
Start: 2024-04-08

## 2024-04-08 RX ORDER — HEPARIN 100 UNIT/ML
500 SYRINGE INTRAVENOUS
Status: DISCONTINUED | OUTPATIENT
Start: 2024-04-08 | End: 2024-04-08 | Stop reason: HOSPADM

## 2024-04-08 RX ORDER — SODIUM CHLORIDE 0.9 % (FLUSH) 0.9 %
10 SYRINGE (ML) INJECTION
Status: DISCONTINUED | OUTPATIENT
Start: 2024-04-08 | End: 2024-04-08 | Stop reason: HOSPADM

## 2024-04-08 RX ORDER — EPINEPHRINE 0.3 MG/.3ML
0.3 INJECTION SUBCUTANEOUS ONCE AS NEEDED
Status: DISCONTINUED | OUTPATIENT
Start: 2024-04-08 | End: 2024-04-08 | Stop reason: HOSPADM

## 2024-04-08 RX ORDER — HEPARIN 100 UNIT/ML
500 SYRINGE INTRAVENOUS
Status: CANCELLED | OUTPATIENT
Start: 2024-04-08

## 2024-04-08 RX ORDER — DIPHENHYDRAMINE HYDROCHLORIDE 50 MG/ML
50 INJECTION INTRAMUSCULAR; INTRAVENOUS ONCE AS NEEDED
Status: DISCONTINUED | OUTPATIENT
Start: 2024-04-08 | End: 2024-04-08 | Stop reason: HOSPADM

## 2024-04-08 RX ADMIN — SODIUM CHLORIDE 1500 MG: 9 INJECTION, SOLUTION INTRAVENOUS at 11:04

## 2024-04-08 RX ADMIN — SODIUM CHLORIDE: 9 INJECTION, SOLUTION INTRAVENOUS at 11:04

## 2024-04-08 NOTE — PROGRESS NOTES
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  The mass abuts the right main pulmonary artery and right main stem bronchus.  Subcentimeter mediastinal lymph node noted.  One of LN slightly hypermetabolic with SUV of 3 and is located laterally.  Patchy hypermetabolism within the liver as suspected, however, there are few tiny focal areas of hypermetabolism out of proportion to the rest of the liver.  At least 1 of this corresponds to a low-density focus seen in the noncontrast exam.  CT 5/12/2023: changes consistent with COPD and emphysema in the lungs bilaterally with multiple bulla bleb seen. mass seen in the right hilum extending into the right upper lobe.  It is somewhat infiltrative.  This was seen on the prior PET-CT as well.  The findings are not significantly changed.  There are multiple associated satellite nodules in the right upper lobe.  Rough dimensions of the hilar component of the mass is 5.4 x 5.1 cm and rough measurements of the dominant lesion in the right upper lobe is 11 cm x 3.6 cm.  The mass extends into the right mainstem and right upper lobe bronchus.  This was seen on the prior examination as well.  There is narrowing of the right upper lobe bronchus there is some narrowing of the right upper lobe pulmonary arteries.  Findings are similar to the prior examination.  Scattered areas of punctate subcentimeter nodularity is seen in the right lower lobe.  These are too  small to characterize and similar changes were seen previously.    No pleural effusion is seen.  Thoracic aorta is normal in caliber.  There is some mediastinal lymphadenopathy seen.  Reference lymph node is measured in the precarinal region on image number 39 series 2 at 1.5 x 0.9 cm.  This is similar to the prior examination.  Scattered punctate areas of hypoattenuation are seen throughout the liver.  Similar changes were seen previously.  The lesions are too small to characterize but may represent cysts.  The largest lesion is seen in segment 4.  It does not enhance and measures 2 cm x 1.5 cm.  Findings may represent a cyst.  No adrenal nodules are seen.  CT Head  @ [facility]. Mild chronic white matter changes. Old left caudate lobe lesion. 2. No evidence of metastatic disease.  CT C/A/P 9/15/2023:  1. Interval resolution of previously seen endobronchial right hilar mass.  2. Improved aeration of the right upper lobe.  There are residual but improved patchy irregular opacities in the right upper lobe, predominantly peripherally located  3. Interval improvement of mediastinal lymph nodes  4. Unchanged hepatic cyst and too small to characterize hepatic hypodensities  CT Chest 2024:  1. No detrimental change from previous exam.  2. Similar small mediastinal lymph nodes and patchy irregular opacities in the right upper lobe  3. Trace right pleural fluid, similar to previous.   Bilateral diagnostic bilateral mammogram with right breast US 2024: IMPRESSION: BENIGN  1. No mammographic evidence of malignancy in either breast.  No sonographic evidence of malignancy in the subareolar region of either breast.     2. Minimal gynecomastia in the bilateral breasts is benign.  The patient was encouraged to follow up with his primary care provider, as clinical correlation (including correlation with current medications, laboratory values, and physical examination) is recommended to determine an underlying  etiology.    CT C/A/P 4/4/2024: level 4R paratracheal lymph node measures 9 x 9 mm, enlarged compared with 5 x 5 mm previously. The superior mediastinal paraesophageal adenopathy is unchanged. The subcarinal lymphadenopathy is unchanged.   1. Interval enlargement of a level 4R paratracheal lymph nodes suspicious for progressive disease.  The remaining mediastinal lymph nodes are unchanged.  2. Stable appearance of the interlobular septal thickening and bronchiectasis in the right upper lobe favored to represent post treatment effect.  3. Slightly improved trace right pleural effusion.         Pathology:  02/10/2023:  Right upper lobe lung needle biopsy: Scattered highly atypical squamous cells, suspicious for squamous cell carcinoma in a background of abundant acute inflammation and necrosis  Right upper lobe, brushing:  Positive for malignant cells.  Squamous cell carcinoma, moderately differentiated.  Right upper lobe biopsy squamous cell carcinoma, moderately differentiated with focal keratinization.  Right lung washings positive for malignant cells.  Squamous cell carcinoma in a background of abundant necrosis.      CLINICAL HISTORY:       Patient: Deondre Ocampo IV is a 68 y.o. male.  Kindly referred for newly diagnosed squamous cell carcinoma of the lung.    He reports that he was being evaluated for a right side pain went imaging studies showed a mass in the lung.  I do not have the CTs.  But he eventually underwent bronchoscopy that showed squamous cell carcinoma.  PET-CT showed the right suprahilar mass with possible mediastinal lymphadenopathy and questionable liver lesions.  He is here to discuss further recommendations.    He is here with his wife.  Patient has history of 53 pack year smoking.  He reports that his breathing is fine.  He denies any chest pain, short of breath or coughing.  No hemoptysis.  He has an appointment to see the radiation oncologist next week.    Chief Complaint: 4 Week  Follow Up (Scan Results.)      Interval History:  He completed 6 cycle of weekly Carbo/Taxol on 7/12/23 along with XRT.     Patient presents today for follow up to discuss CT scan results and continuation of Imfinzi, C6 due today. He is accompanied by his wife. He continues with chronic back pain. He has had multiple back surgeries in the past. He reports falling at home this past Saturday on his left side. He did not go to ER for evaluation. He says he scheduled for an MRI of his back @ [facility] on Wednesday 4/10/24 ordered by his orthopedic.   CT scan results and recommendations discussed with them in detail, all questions answered.      Past Medical History:   Diagnosis Date    Diabetes mellitus     High cholesterol     Hypertension     Lung cancer       Past Surgical History:   Procedure Laterality Date    BACK SURGERY      DEBRIDEMENT TENNIS ELBOW Right     PERIPHERALLY INSERTED CENTRAL CATHETER INSERTION Left 5/29/2023    Procedure: Insertion-picc;  Surgeon: Letha Silver MD;  Location: [facility];  Service: Oncology;  Laterality: Left;    right arm       Family History   Problem Relation Age of Onset    Coronary artery disease Mother     No Known Problems Father     Leukemia Daughter 42    Coronary artery disease Brother     Bone cancer Brother 35    Diabetes Brother     Heart failure Brother     Diabetes Brother     Leukemia Brother     Breast cancer Neg Hx         neg fam history of breast cancer     Social Connections: Not on file       Review of patient's allergies indicates:   Allergen Reactions    Ace inhibitors Swelling     Other reaction(s): Angioedema, Respiratory distress    Penicillins Itching and Rash      Current Outpatient Medications on File Prior to Visit   Medication Sig Dispense Refill    aspirin (ECOTRIN) 81 MG EC tablet Take 81 mg by mouth once daily.      benzonatate (TESSALON) 100 MG capsule Take 100 mg by mouth 3 (three) times daily as needed.      dicyclomine (BENTYL) 20 mg tablet Take  20 mg by mouth once daily. Once daily in the AM.      gabapentin (NEURONTIN) 100 MG capsule Take 100 mg by mouth 3 (three) times daily.      glimepiride (AMARYL) 2 MG tablet Take 2 mg by mouth before breakfast.      losartan (COZAAR) 100 MG tablet Take 100 mg by mouth once daily.      metoprolol tartrate (LOPRESSOR) 50 MG tablet Take 50 mg by mouth 2 (two) times daily.      naloxone (NARCAN) 4 mg/actuation Spry SMARTSIG:Both Nares      oxyCODONE-acetaminophen (PERCOCET)  mg per tablet Take 1 tablet by mouth every 6 (six) hours as needed for Pain. for pain. 30 tablet 0    pantoprazole (PROTONIX) 40 MG tablet Take 40 mg by mouth.      pravastatin (PRAVACHOL) 20 MG tablet Take 20 mg by mouth once daily.      TRULICITY 1.5 mg/0.5 mL pen injector SMARTSI pre-filled pen syringe SUB-Q Once a Week       No current facility-administered medications on file prior to visit.      Review of Systems   Constitutional:  Positive for fatigue. Negative for activity change, appetite change, chills, diaphoresis, fever and unexpected weight change.   HENT:  Negative for nasal congestion, mouth sores, nosebleeds, sinus pressure/congestion, sore throat and trouble swallowing.    Eyes: Negative.    Respiratory:  Positive for cough (chronic but better). Negative for shortness of breath.    Cardiovascular:  Negative for chest pain and palpitations.   Gastrointestinal:  Negative for abdominal distention, abdominal pain, blood in stool, change in bowel habit, constipation, diarrhea, nausea and vomiting.   Endocrine: Negative.    Genitourinary:  Negative for bladder incontinence, decreased urine volume, difficulty urinating, dysuria, frequency, hematuria and urgency.   Musculoskeletal:  Positive for back pain (same) and neck pain (same). Negative for arthralgias, gait problem, joint swelling, leg pain and myalgias.   Integumentary:  Negative for rash.        Right breast pain (nipple)   Allergic/Immunologic: Negative.    Neurological:   "Negative for dizziness, tremors, syncope, weakness, light-headedness, numbness, headaches and memory loss.   Hematological:  Negative for adenopathy. Does not bruise/bleed easily.   Psychiatric/Behavioral:  Negative for agitation, confusion, hallucinations, sleep disturbance and suicidal ideas. The patient is not nervous/anxious.           Vitals:    04/08/24 1027   BP: 137/77   BP Location: Left arm   Patient Position: Sitting   Pulse: (!) 55   Resp: 18   Temp: 98.1 °F (36.7 °C)   TempSrc: Oral   SpO2: 95%   Weight: 98.9 kg (218 lb)   Height: 6' 1" (1.854 m)         Wt Readings from Last 6 Encounters:   04/08/24 98.9 kg (218 lb)   03/11/24 96.1 kg (211 lb 12.8 oz)   02/12/24 96.4 kg (212 lb 9.6 oz)   02/08/24 94.8 kg (209 lb)   01/10/24 94.4 kg (208 lb 1.8 oz)   01/10/24 94.4 kg (208 lb 3.2 oz)        Body mass index is 28.76 kg/m².  Body surface area is 2.26 meters squared.  Physical Exam  Vitals and nursing note reviewed.   Constitutional:       General: He is not in acute distress.     Appearance: Normal appearance.   HENT:      Head: Normocephalic and atraumatic.      Mouth/Throat:      Mouth: Mucous membranes are moist.   Eyes:      General: No scleral icterus.     Extraocular Movements: Extraocular movements intact.      Conjunctiva/sclera: Conjunctivae normal.   Neck:      Vascular: No JVD.   Cardiovascular:      Rate and Rhythm: Normal rate and regular rhythm.      Heart sounds: No murmur heard.  Pulmonary:      Effort: Pulmonary effort is normal.      Breath sounds: Decreased breath sounds present. No wheezing or rhonchi.   Chest:   Breasts:     Right: No swelling, bleeding, inverted nipple, mass, nipple discharge or skin change.      Left: Normal.   Abdominal:      General: Bowel sounds are normal. There is no distension.      Palpations: Abdomen is soft.      Tenderness: There is no abdominal tenderness.   Musculoskeletal:         General: No swelling or deformity.      Cervical back: Neck supple. "   Lymphadenopathy:      Head:      Right side of head: No submandibular adenopathy.      Left side of head: No submandibular adenopathy.      Cervical: No cervical adenopathy.      Upper Body:      Right upper body: No supraclavicular or axillary adenopathy.      Left upper body: No supraclavicular or axillary adenopathy.      Lower Body: No right inguinal adenopathy. No left inguinal adenopathy.   Skin:     General: Skin is warm.      Coloration: Skin is not jaundiced.      Findings: No rash.   Neurological:      Mental Status: He is alert and oriented to person, place, and time.      Cranial Nerves: Cranial nerves 2-12 are intact.   Psychiatric:         Attention and Perception: Attention normal.         Behavior: Behavior is cooperative.       ECOG SCORE    1 - Restricted in strenuous activity-ambulatory and able to carry out work of a light nature         Laboratory:  CBC with Differential:  Lab Results   Component Value Date    WBC 4.98 04/08/2024    RBC 3.83 (L) 04/08/2024    HGB 12.3 (L) 04/08/2024    HCT 37.7 (L) 04/08/2024    MCV 98.4 (H) 04/08/2024    MCH 32.1 (H) 04/08/2024    MCHC 32.6 (L) 04/08/2024    RDW 14.1 04/08/2024     04/08/2024    MPV 8.7 04/08/2024        CMP:  Sodium Level   Date Value Ref Range Status   04/08/2024 141 136 - 145 mmol/L Final     Potassium Level   Date Value Ref Range Status   04/08/2024 4.3 3.5 - 5.1 mmol/L Final     Carbon Dioxide   Date Value Ref Range Status   04/08/2024 27 23 - 31 mmol/L Final     Blood Urea Nitrogen   Date Value Ref Range Status   04/08/2024 11.1 8.4 - 25.7 mg/dL Final     Creatinine   Date Value Ref Range Status   04/08/2024 1.06 0.73 - 1.18 mg/dL Final     Calcium Level Total   Date Value Ref Range Status   04/08/2024 9.2 8.8 - 10.0 mg/dL Final     Albumin Level   Date Value Ref Range Status   04/08/2024 3.3 (L) 3.4 - 4.8 g/dL Final     Bilirubin Total   Date Value Ref Range Status   04/08/2024 0.4 <=1.5 mg/dL Final     Alkaline Phosphatase    Date Value Ref Range Status   04/08/2024 73 40 - 150 unit/L Final     Aspartate Aminotransferase   Date Value Ref Range Status   04/08/2024 7 5 - 34 unit/L Final     Alanine Aminotransferase   Date Value Ref Range Status   04/08/2024 6 0 - 55 unit/L Final         Assessment:       1) cT4N2?Mx- Stage III Squamous cell carcinoma of the right upper lobe  -Large hilar mass that extends to the RUL  -Completed 6 cycle of weekly Carbo/Taxol on 7/12/23 along with XRT.   -Restaging CT with GAURAV  -Surgical reevaluation -- no surgery recommended as restaging scan with GAURAV  -maintenance immunotherapy (Durvalumab)-11/15/2023-present  -Restaging CT C/A/P 4/4/2024: level 4R paratracheal lymph node measures 9 x 9 mm, enlarged compared with 5 x 5 mm previously. The superior mediastinal paraesophageal adenopathy is unchanged. The subcarinal lymphadenopathy is unchanged. The remaining mediastinal lymph nodes are unchanged. Slightly improved trace right pleural effusion.    2) Liver hypodensities--> suggesting cysts      Plan:       Aunt with my progression of level 4R paratracheal lymph node.  This could be inflammatory versus progression of his disease.  At this time is still small and we will continue present treatment.  We will continue to monitor closely.  If continue to grow may benefit of surgical evaluation.    Okay to proceed today with maintenance immunotherapy (Durvalumab) x 1 year - okay to administer peripherally  Continue follow ups with Dr. Hoff  Keep appointment for MRI back on 4/1/0/24  CT C/A/P every 3 months - due 7/2024, ordered.   RTC in 4 weeks with NP for TD/labs/infusion same day - he lives in [place] - pt requests appointments @ 11 am or 1 pm  Labs: CBC, CMP, TSH - standing order placed    Encouraged to call with questions or problems  The patient was given ample opportunity to ask questions and they were all answered to satisfaction; patient demonstrated understanding of what we discussed and is  agreeable to the plan.    Letha Silver MD  Hematology/Oncology      Professional Services   I, Jenelle Waters LPN, acted solely as a scribe for and in the presence of Dr. Letha Silver, who performed these services.

## 2024-05-06 ENCOUNTER — INFUSION (OUTPATIENT)
Dept: INFUSION THERAPY | Facility: HOSPITAL | Age: 69
End: 2024-05-06
Attending: INTERNAL MEDICINE
Payer: MEDICARE

## 2024-05-06 ENCOUNTER — OFFICE VISIT (OUTPATIENT)
Dept: HEMATOLOGY/ONCOLOGY | Facility: CLINIC | Age: 69
End: 2024-05-06
Payer: MEDICARE

## 2024-05-06 VITALS
TEMPERATURE: 98 F | HEIGHT: 73 IN | DIASTOLIC BLOOD PRESSURE: 82 MMHG | SYSTOLIC BLOOD PRESSURE: 132 MMHG | RESPIRATION RATE: 18 BRPM | BODY MASS INDEX: 28.84 KG/M2 | OXYGEN SATURATION: 98 % | HEART RATE: 55 BPM | WEIGHT: 217.63 LBS

## 2024-05-06 DIAGNOSIS — C34.11 MALIGNANT NEOPLASM OF UPPER LOBE OF RIGHT LUNG: ICD-10-CM

## 2024-05-06 DIAGNOSIS — C34.91 SQUAMOUS CELL CARCINOMA OF RIGHT LUNG: Primary | ICD-10-CM

## 2024-05-06 DIAGNOSIS — E03.2 DRUG-INDUCED HYPOTHYROIDISM: ICD-10-CM

## 2024-05-06 DIAGNOSIS — Z51.12 MAINTENANCE ANTINEOPLASTIC IMMUNOTHERAPY: ICD-10-CM

## 2024-05-06 PROCEDURE — 1126F AMNT PAIN NOTED NONE PRSNT: CPT | Mod: CPTII,S$GLB,, | Performed by: NURSE PRACTITIONER

## 2024-05-06 PROCEDURE — 1101F PT FALLS ASSESS-DOCD LE1/YR: CPT | Mod: CPTII,S$GLB,, | Performed by: NURSE PRACTITIONER

## 2024-05-06 PROCEDURE — 99999 PR PBB SHADOW E&M-EST. PATIENT-LVL III: CPT | Mod: PBBFAC,,, | Performed by: NURSE PRACTITIONER

## 2024-05-06 PROCEDURE — 25000003 PHARM REV CODE 250: Performed by: NURSE PRACTITIONER

## 2024-05-06 PROCEDURE — 96413 CHEMO IV INFUSION 1 HR: CPT

## 2024-05-06 PROCEDURE — 3079F DIAST BP 80-89 MM HG: CPT | Mod: CPTII,S$GLB,, | Performed by: NURSE PRACTITIONER

## 2024-05-06 PROCEDURE — 3008F BODY MASS INDEX DOCD: CPT | Mod: CPTII,S$GLB,, | Performed by: NURSE PRACTITIONER

## 2024-05-06 PROCEDURE — 63600175 PHARM REV CODE 636 W HCPCS: Mod: JZ,JG | Performed by: NURSE PRACTITIONER

## 2024-05-06 PROCEDURE — 3288F FALL RISK ASSESSMENT DOCD: CPT | Mod: CPTII,S$GLB,, | Performed by: NURSE PRACTITIONER

## 2024-05-06 PROCEDURE — 99215 OFFICE O/P EST HI 40 MIN: CPT | Mod: S$GLB,,, | Performed by: NURSE PRACTITIONER

## 2024-05-06 PROCEDURE — 4010F ACE/ARB THERAPY RXD/TAKEN: CPT | Mod: CPTII,S$GLB,, | Performed by: NURSE PRACTITIONER

## 2024-05-06 PROCEDURE — 3075F SYST BP GE 130 - 139MM HG: CPT | Mod: CPTII,S$GLB,, | Performed by: NURSE PRACTITIONER

## 2024-05-06 RX ORDER — EPINEPHRINE 0.3 MG/.3ML
0.3 INJECTION SUBCUTANEOUS ONCE AS NEEDED
Status: CANCELLED | OUTPATIENT
Start: 2024-05-06

## 2024-05-06 RX ORDER — SODIUM CHLORIDE 0.9 % (FLUSH) 0.9 %
10 SYRINGE (ML) INJECTION
Status: CANCELLED | OUTPATIENT
Start: 2024-05-06

## 2024-05-06 RX ORDER — SODIUM CHLORIDE 0.9 % (FLUSH) 0.9 %
10 SYRINGE (ML) INJECTION
Status: DISCONTINUED | OUTPATIENT
Start: 2024-05-06 | End: 2024-05-06 | Stop reason: HOSPADM

## 2024-05-06 RX ORDER — HEPARIN 100 UNIT/ML
500 SYRINGE INTRAVENOUS
Status: CANCELLED | OUTPATIENT
Start: 2024-05-06

## 2024-05-06 RX ORDER — DIPHENHYDRAMINE HYDROCHLORIDE 50 MG/ML
50 INJECTION INTRAMUSCULAR; INTRAVENOUS ONCE AS NEEDED
Status: CANCELLED | OUTPATIENT
Start: 2024-05-06

## 2024-05-06 RX ORDER — DIPHENHYDRAMINE HYDROCHLORIDE 50 MG/ML
50 INJECTION INTRAMUSCULAR; INTRAVENOUS ONCE AS NEEDED
Status: DISCONTINUED | OUTPATIENT
Start: 2024-05-06 | End: 2024-05-06 | Stop reason: HOSPADM

## 2024-05-06 RX ORDER — EPINEPHRINE 0.3 MG/.3ML
0.3 INJECTION SUBCUTANEOUS ONCE AS NEEDED
Status: DISCONTINUED | OUTPATIENT
Start: 2024-05-06 | End: 2024-05-06 | Stop reason: HOSPADM

## 2024-05-06 RX ORDER — HEPARIN 100 UNIT/ML
500 SYRINGE INTRAVENOUS
Status: DISCONTINUED | OUTPATIENT
Start: 2024-05-06 | End: 2024-05-06 | Stop reason: HOSPADM

## 2024-05-06 RX ADMIN — SODIUM CHLORIDE 1500 MG: 9 INJECTION, SOLUTION INTRAVENOUS at 02:05

## 2024-05-06 NOTE — PROGRESS NOTES
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 Dx: 2/10/2023    Present treatment:  maintenance immunotherapy (Durvalumab)-11/15/2023-present    Treatment/Oncology history:  Carbo/Taxol + RT completed 7/12/2023  maintenance immunotherapy (Durvalumab)-11/15/2023-present      Imaging:  PET-CT 03/10/2023:  Hypermetabolic mass in the right supra hilar region measuring 4.7 x 2.9 cm SUV of 20.8.  The mass abuts the right main pulmonary artery and right main stem bronchus.  Subcentimeter mediastinal lymph node noted.  One of LN slightly hypermetabolic with SUV of 3 and is located laterally.  Patchy hypermetabolism within the liver as suspected, however, there are few tiny focal areas of hypermetabolism out of proportion to the rest of the liver.  At least 1 of this corresponds to a low-density focus seen in the noncontrast exam.  CT 5/12/2023: changes consistent with COPD and emphysema in the lungs bilaterally with multiple bulla bleb seen. mass seen in the right hilum extending into the right upper lobe.  It is somewhat infiltrative.  This was seen on the prior PET-CT as well.  The findings are not significantly changed.  There are multiple associated satellite nodules in the right upper lobe.  Rough dimensions of the hilar component of the mass is 5.4 x 5.1 cm and rough measurements of the dominant lesion in the right upper lobe is 11 cm x 3.6 cm.  The mass extends into the right mainstem and right upper lobe bronchus.  This was seen on the prior examination as well.  There is narrowing of the right upper lobe bronchus there is some narrowing of the right upper lobe pulmonary arteries.  Findings are similar to the prior examination.  Scattered areas of punctate subcentimeter nodularity is seen in the right lower lobe.   These are too small to characterize and similar changes were seen previously.    No pleural effusion is seen.  Thoracic aorta is normal in caliber.  There is some mediastinal lymphadenopathy seen.  Reference lymph node is measured in the precarinal region on image number 39 series 2 at 1.5 x 0.9 cm.  This is similar to the prior examination.  Scattered punctate areas of hypoattenuation are seen throughout the liver.  Similar changes were seen previously.  The lesions are too small to characterize but may represent cysts.  The largest lesion is seen in segment 4.  It does not enhance and measures 2 cm x 1.5 cm.  Findings may represent a cyst.  No adrenal nodules are seen.  CT Head  @ [facility]. Mild chronic white matter changes. Old left caudate lobe lesion. 2. No evidence of metastatic disease.  CT C/A/P 9/15/2023:  1. Interval resolution of previously seen endobronchial right hilar mass.  2. Improved aeration of the right upper lobe.  There are residual but improved patchy irregular opacities in the right upper lobe, predominantly peripherally located  3. Interval improvement of mediastinal lymph nodes  4. Unchanged hepatic cyst and too small to characterize hepatic hypodensities  CT Chest 2024:  1. No detrimental change from previous exam.  2. Similar small mediastinal lymph nodes and patchy irregular opacities in the right upper lobe  3. Trace right pleural fluid, similar to previous.   Bilateral diagnostic bilateral mammogram with right breast US 2024: IMPRESSION: BENIGN  1. No mammographic evidence of malignancy in either breast.  No sonographic evidence of malignancy in the subareolar region of either breast.     2. Minimal gynecomastia in the bilateral breasts is benign.  The patient was encouraged to follow up with his primary care provider, as clinical correlation (including correlation with current medications, laboratory values, and physical examination) is recommended to determine an  underlying etiology.    CT C/A/P 4/4/2024: level 4R paratracheal lymph node measures 9 x 9 mm, enlarged compared with 5 x 5 mm previously. The superior mediastinal paraesophageal adenopathy is unchanged. The subcarinal lymphadenopathy is unchanged.   1. Interval enlargement of a level 4R paratracheal lymph nodes suspicious for progressive disease.  The remaining mediastinal lymph nodes are unchanged.  2. Stable appearance of the interlobular septal thickening and bronchiectasis in the right upper lobe favored to represent post treatment effect.  3. Slightly improved trace right pleural effusion.         Pathology:  02/10/2023:  Right upper lobe lung needle biopsy: Scattered highly atypical squamous cells, suspicious for squamous cell carcinoma in a background of abundant acute inflammation and necrosis  Right upper lobe, brushing:  Positive for malignant cells.  Squamous cell carcinoma, moderately differentiated.  Right upper lobe biopsy squamous cell carcinoma, moderately differentiated with focal keratinization.  Right lung washings positive for malignant cells.  Squamous cell carcinoma in a background of abundant necrosis.      CLINICAL HISTORY:       Patient: Deondre Ocampo IV is a 68 y.o. male.  Kindly referred for newly diagnosed squamous cell carcinoma of the lung.    He reports that he was being evaluated for a right side pain went imaging studies showed a mass in the lung.  I do not have the CTs.  But he eventually underwent bronchoscopy that showed squamous cell carcinoma.  PET-CT showed the right suprahilar mass with possible mediastinal lymphadenopathy and questionable liver lesions.  He is here to discuss further recommendations.    He is here with his wife.  Patient has history of 53 pack year smoking.  He reports that his breathing is fine.  He denies any chest pain, short of breath or coughing.  No hemoptysis.  He has an appointment to see the radiation oncologist next week.    Chief Complaint:  No chief complaint on file.      Interval History:  He completed 6 cycle of weekly Carbo/Taxol on 7/12/23 along with XRT.     Patient presents today for follow up and continuation of Imfinzi, C7 due today. He is accompanied by his wife. He continues with chronic back pain. He has had multiple back surgeries in the past. He has upcoming f/u for MRI results of his back ordered by another MD. No evidence of cancer metastasis to his spine. He was diagnosed with Bell's Palsy in the interim.     Past Medical History:   Diagnosis Date    Diabetes mellitus     High cholesterol     Hypertension     Lung cancer       Past Surgical History:   Procedure Laterality Date    BACK SURGERY      DEBRIDEMENT TENNIS ELBOW Right     PERIPHERALLY INSERTED CENTRAL CATHETER INSERTION Left 5/29/2023    Procedure: Insertion-picc;  Surgeon: Letha Silver MD;  Location: [facility];  Service: Oncology;  Laterality: Left;    right arm       Family History   Problem Relation Name Age of Onset    Coronary artery disease Mother      No Known Problems Father      Leukemia Daughter  42    Coronary artery disease Brother      Bone cancer Brother  35    Diabetes Brother      Heart failure Brother      Diabetes Brother      Leukemia Brother      Breast cancer Neg Hx          neg fam history of breast cancer            Review of patient's allergies indicates:   Allergen Reactions    Ace inhibitors Swelling     Other reaction(s): Angioedema, Respiratory distress    Penicillins Itching and Rash      Current Outpatient Medications on File Prior to Visit   Medication Sig Dispense Refill    aspirin (ECOTRIN) 81 MG EC tablet Take 81 mg by mouth once daily.      benzonatate (TESSALON) 100 MG capsule Take 100 mg by mouth 3 (three) times daily as needed.      dicyclomine (BENTYL) 20 mg tablet Take 20 mg by mouth once daily. Once daily in the AM.      gabapentin (NEURONTIN) 100 MG capsule Take 100 mg by mouth 3 (three) times daily.      glimepiride (AMARYL) 2  MG tablet Take 2 mg by mouth before breakfast.      losartan (COZAAR) 100 MG tablet Take 100 mg by mouth once daily.      metoprolol tartrate (LOPRESSOR) 50 MG tablet Take 50 mg by mouth 2 (two) times daily.      naloxone (NARCAN) 4 mg/actuation Spry SMARTSIG:Both Nares      oxyCODONE-acetaminophen (PERCOCET)  mg per tablet Take 1 tablet by mouth every 6 (six) hours as needed for Pain. for pain. 30 tablet 0    pantoprazole (PROTONIX) 40 MG tablet Take 40 mg by mouth.      pravastatin (PRAVACHOL) 20 MG tablet Take 20 mg by mouth once daily.      TRULICITY 1.5 mg/0.5 mL pen injector SMARTSI pre-filled pen syringe SUB-Q Once a Week       No current facility-administered medications on file prior to visit.      Review of Systems   Constitutional:  Positive for fatigue. Negative for activity change, appetite change, chills, diaphoresis, fever and unexpected weight change.   HENT:  Negative for nasal congestion, mouth sores, nosebleeds, sinus pressure/congestion, sore throat and trouble swallowing.    Eyes: Negative.    Respiratory:  Positive for cough (chronic but better). Negative for shortness of breath.    Cardiovascular:  Negative for chest pain and palpitations.   Gastrointestinal:  Negative for abdominal distention, abdominal pain, blood in stool, change in bowel habit, constipation, diarrhea, nausea and vomiting.   Endocrine: Negative.    Genitourinary:  Negative for bladder incontinence, decreased urine volume, difficulty urinating, dysuria, frequency, hematuria and urgency.   Musculoskeletal:  Positive for back pain (same) and neck pain (same). Negative for arthralgias, gait problem, joint swelling, leg pain and myalgias.   Integumentary:  Negative for rash.        Right breast pain (nipple)   Allergic/Immunologic: Negative.    Neurological:  Negative for dizziness, tremors, syncope, weakness, light-headedness, numbness, headaches and memory loss.   Hematological:  Negative for adenopathy. Does not  bruise/bleed easily.   Psychiatric/Behavioral:  Negative for agitation, confusion, hallucinations, sleep disturbance and suicidal ideas. The patient is not nervous/anxious.           There were no vitals filed for this visit.        Wt Readings from Last 6 Encounters:   04/08/24 98.9 kg (218 lb)   03/11/24 96.1 kg (211 lb 12.8 oz)   02/12/24 96.4 kg (212 lb 9.6 oz)   02/08/24 94.8 kg (209 lb)   01/10/24 94.4 kg (208 lb 1.8 oz)   01/10/24 94.4 kg (208 lb 3.2 oz)        There is no height or weight on file to calculate BMI.  There is no height or weight on file to calculate BSA.  Physical Exam  Vitals and nursing note reviewed.   Constitutional:       General: He is not in acute distress.     Appearance: Normal appearance.   HENT:      Head: Normocephalic and atraumatic.      Mouth/Throat:      Mouth: Mucous membranes are moist.   Eyes:      General: No scleral icterus.     Extraocular Movements: Extraocular movements intact.      Conjunctiva/sclera: Conjunctivae normal.   Neck:      Vascular: No JVD.   Cardiovascular:      Rate and Rhythm: Normal rate and regular rhythm.      Heart sounds: No murmur heard.  Pulmonary:      Effort: Pulmonary effort is normal.      Breath sounds: Decreased breath sounds present. No wheezing or rhonchi.   Chest:   Breasts:     Right: No swelling, bleeding, inverted nipple, mass, nipple discharge or skin change.      Left: Normal.   Abdominal:      General: Bowel sounds are normal. There is no distension.      Palpations: Abdomen is soft.      Tenderness: There is no abdominal tenderness.   Musculoskeletal:         General: No swelling or deformity.      Cervical back: Neck supple.   Lymphadenopathy:      Head:      Right side of head: No submandibular adenopathy.      Left side of head: No submandibular adenopathy.      Cervical: No cervical adenopathy.      Upper Body:      Right upper body: No supraclavicular or axillary adenopathy.      Left upper body: No supraclavicular or axillary  adenopathy.      Lower Body: No right inguinal adenopathy. No left inguinal adenopathy.   Skin:     General: Skin is warm.      Coloration: Skin is not jaundiced.      Findings: No rash.   Neurological:      Mental Status: He is alert and oriented to person, place, and time.      Cranial Nerves: Cranial nerves 2-12 are intact.      Comments: Dropping on left side of his face. Left eye is more open than his right eye.    Psychiatric:         Attention and Perception: Attention normal.         Behavior: Behavior is cooperative.     ECOG SCORE             Laboratory:  CBC with Differential:  Lab Results   Component Value Date    WBC 4.98 04/08/2024    RBC 3.83 (L) 04/08/2024    HGB 12.3 (L) 04/08/2024    HCT 37.7 (L) 04/08/2024    MCV 98.4 (H) 04/08/2024    MCH 32.1 (H) 04/08/2024    MCHC 32.6 (L) 04/08/2024    RDW 14.1 04/08/2024     04/08/2024    MPV 8.7 04/08/2024        CMP:  Sodium Level   Date Value Ref Range Status   04/08/2024 141 136 - 145 mmol/L Final     Potassium Level   Date Value Ref Range Status   04/08/2024 4.3 3.5 - 5.1 mmol/L Final     Carbon Dioxide   Date Value Ref Range Status   04/08/2024 27 23 - 31 mmol/L Final     Blood Urea Nitrogen   Date Value Ref Range Status   04/08/2024 11.1 8.4 - 25.7 mg/dL Final     Creatinine   Date Value Ref Range Status   04/08/2024 1.06 0.73 - 1.18 mg/dL Final     Calcium Level Total   Date Value Ref Range Status   04/08/2024 9.2 8.8 - 10.0 mg/dL Final     Albumin Level   Date Value Ref Range Status   04/08/2024 3.3 (L) 3.4 - 4.8 g/dL Final     Bilirubin Total   Date Value Ref Range Status   04/08/2024 0.4 <=1.5 mg/dL Final     Alkaline Phosphatase   Date Value Ref Range Status   04/08/2024 73 40 - 150 unit/L Final     Aspartate Aminotransferase   Date Value Ref Range Status   04/08/2024 7 5 - 34 unit/L Final     Alanine Aminotransferase   Date Value Ref Range Status   04/08/2024 6 0 - 55 unit/L Final         Assessment:       1) cT4N2?Mx- Stage III Squamous  cell carcinoma of the right upper lobe  -Large hilar mass that extends to the RUL  -Completed 6 cycle of weekly Carbo/Taxol on 7/12/23 along with XRT.   -Restaging CT with GAURAV  -Surgical reevaluation -- no surgery recommended as restaging scan with GAURAV  -maintenance immunotherapy (Durvalumab)-11/15/2023-present  -Restaging CT C/A/P 4/4/2024: level 4R paratracheal lymph node measures 9 x 9 mm, enlarged compared with 5 x 5 mm previously. The superior mediastinal paraesophageal adenopathy is unchanged. The subcarinal lymphadenopathy is unchanged. The remaining mediastinal lymph nodes are unchanged. Slightly improved trace right pleural effusion.    2) Liver hypodensities--> suggesting cysts      Plan:       Patient with progression of level 4R paratracheal lymph node.  This could be inflammatory versus progression of his disease.  At this time is still small and we will continue present treatment.  We will continue to monitor closely.  If continue to grow may benefit of surgical evaluation.    Okay to proceed today with maintenance immunotherapy (Durvalumab) x 1 year - okay to administer peripherally  Continue follow ups with Dr. Hoff  CT C/A/P every 3 months - due 7/2024,  RTC in 4 weeks with NP for TD/labs/infusion same day - he lives in [place] - pt requests appointments @ 11 am or 1 pm  Labs: CBC, CMP, TSH - standing order placed    Encouraged to call with questions or problems  The patient was given ample opportunity to ask questions and they were all answered to satisfaction; patient demonstrated understanding of what we discussed and is agreeable to the plan.    JYOTHI Gay

## 2024-06-03 ENCOUNTER — INFUSION (OUTPATIENT)
Dept: INFUSION THERAPY | Facility: HOSPITAL | Age: 69
End: 2024-06-03
Attending: INTERNAL MEDICINE
Payer: MEDICARE

## 2024-06-03 ENCOUNTER — OFFICE VISIT (OUTPATIENT)
Dept: HEMATOLOGY/ONCOLOGY | Facility: CLINIC | Age: 69
End: 2024-06-03
Payer: MEDICARE

## 2024-06-03 VITALS
HEART RATE: 56 BPM | BODY MASS INDEX: 28.89 KG/M2 | RESPIRATION RATE: 18 BRPM | TEMPERATURE: 98 F | DIASTOLIC BLOOD PRESSURE: 85 MMHG | OXYGEN SATURATION: 98 % | SYSTOLIC BLOOD PRESSURE: 143 MMHG | HEIGHT: 73 IN | WEIGHT: 218 LBS

## 2024-06-03 DIAGNOSIS — Z51.12 MAINTENANCE ANTINEOPLASTIC IMMUNOTHERAPY: ICD-10-CM

## 2024-06-03 DIAGNOSIS — C34.91 SQUAMOUS CELL CARCINOMA OF RIGHT LUNG: Primary | ICD-10-CM

## 2024-06-03 DIAGNOSIS — C34.11 MALIGNANT NEOPLASM OF UPPER LOBE OF RIGHT LUNG: Primary | ICD-10-CM

## 2024-06-03 DIAGNOSIS — C34.91 SQUAMOUS CELL CARCINOMA OF RIGHT LUNG: ICD-10-CM

## 2024-06-03 DIAGNOSIS — E03.2 DRUG-INDUCED HYPOTHYROIDISM: ICD-10-CM

## 2024-06-03 PROCEDURE — 25000003 PHARM REV CODE 250: Performed by: INTERNAL MEDICINE

## 2024-06-03 PROCEDURE — 3008F BODY MASS INDEX DOCD: CPT | Mod: CPTII,S$GLB,, | Performed by: INTERNAL MEDICINE

## 2024-06-03 PROCEDURE — 1160F RVW MEDS BY RX/DR IN RCRD: CPT | Mod: CPTII,S$GLB,, | Performed by: INTERNAL MEDICINE

## 2024-06-03 PROCEDURE — 1159F MED LIST DOCD IN RCRD: CPT | Mod: CPTII,S$GLB,, | Performed by: INTERNAL MEDICINE

## 2024-06-03 PROCEDURE — 3077F SYST BP >= 140 MM HG: CPT | Mod: CPTII,S$GLB,, | Performed by: INTERNAL MEDICINE

## 2024-06-03 PROCEDURE — 4010F ACE/ARB THERAPY RXD/TAKEN: CPT | Mod: CPTII,S$GLB,, | Performed by: INTERNAL MEDICINE

## 2024-06-03 PROCEDURE — 63600175 PHARM REV CODE 636 W HCPCS: Mod: JZ,JG | Performed by: INTERNAL MEDICINE

## 2024-06-03 PROCEDURE — 1126F AMNT PAIN NOTED NONE PRSNT: CPT | Mod: CPTII,S$GLB,, | Performed by: INTERNAL MEDICINE

## 2024-06-03 PROCEDURE — 1101F PT FALLS ASSESS-DOCD LE1/YR: CPT | Mod: CPTII,S$GLB,, | Performed by: INTERNAL MEDICINE

## 2024-06-03 PROCEDURE — 99215 OFFICE O/P EST HI 40 MIN: CPT | Mod: S$GLB,,, | Performed by: INTERNAL MEDICINE

## 2024-06-03 PROCEDURE — 96413 CHEMO IV INFUSION 1 HR: CPT

## 2024-06-03 PROCEDURE — 3079F DIAST BP 80-89 MM HG: CPT | Mod: CPTII,S$GLB,, | Performed by: INTERNAL MEDICINE

## 2024-06-03 PROCEDURE — 99999 PR PBB SHADOW E&M-EST. PATIENT-LVL IV: CPT | Mod: PBBFAC,,, | Performed by: INTERNAL MEDICINE

## 2024-06-03 PROCEDURE — 3288F FALL RISK ASSESSMENT DOCD: CPT | Mod: CPTII,S$GLB,, | Performed by: INTERNAL MEDICINE

## 2024-06-03 RX ORDER — HEPARIN 100 UNIT/ML
500 SYRINGE INTRAVENOUS
Status: DISCONTINUED | OUTPATIENT
Start: 2024-06-03 | End: 2024-06-03 | Stop reason: HOSPADM

## 2024-06-03 RX ORDER — EPINEPHRINE 0.3 MG/.3ML
0.3 INJECTION SUBCUTANEOUS ONCE AS NEEDED
Status: DISCONTINUED | OUTPATIENT
Start: 2024-06-03 | End: 2024-06-03 | Stop reason: HOSPADM

## 2024-06-03 RX ORDER — DIPHENHYDRAMINE HYDROCHLORIDE 50 MG/ML
50 INJECTION INTRAMUSCULAR; INTRAVENOUS ONCE AS NEEDED
Status: CANCELLED | OUTPATIENT
Start: 2024-06-03

## 2024-06-03 RX ORDER — SODIUM CHLORIDE 0.9 % (FLUSH) 0.9 %
10 SYRINGE (ML) INJECTION
Status: CANCELLED | OUTPATIENT
Start: 2024-06-03

## 2024-06-03 RX ORDER — DIPHENHYDRAMINE HYDROCHLORIDE 50 MG/ML
50 INJECTION INTRAMUSCULAR; INTRAVENOUS ONCE AS NEEDED
Status: DISCONTINUED | OUTPATIENT
Start: 2024-06-03 | End: 2024-06-03 | Stop reason: HOSPADM

## 2024-06-03 RX ORDER — EPINEPHRINE 0.3 MG/.3ML
0.3 INJECTION SUBCUTANEOUS ONCE AS NEEDED
Status: CANCELLED | OUTPATIENT
Start: 2024-06-03

## 2024-06-03 RX ORDER — SODIUM CHLORIDE 0.9 % (FLUSH) 0.9 %
10 SYRINGE (ML) INJECTION
Status: DISCONTINUED | OUTPATIENT
Start: 2024-06-03 | End: 2024-06-03 | Stop reason: HOSPADM

## 2024-06-03 RX ORDER — CELECOXIB 200 MG/1
200 CAPSULE ORAL 2 TIMES DAILY
COMMUNITY
Start: 2024-05-08

## 2024-06-03 RX ORDER — HEPARIN 100 UNIT/ML
500 SYRINGE INTRAVENOUS
Status: CANCELLED | OUTPATIENT
Start: 2024-06-03

## 2024-06-03 RX ADMIN — SODIUM CHLORIDE 1500 MG: 9 INJECTION, SOLUTION INTRAVENOUS at 12:06

## 2024-06-03 RX ADMIN — SODIUM CHLORIDE: 9 INJECTION, SOLUTION INTRAVENOUS at 12:06

## 2024-06-03 NOTE — PROGRESS NOTES
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 Dx: 2/10/2023    Present treatment:  maintenance immunotherapy (Durvalumab)-11/15/2023-present    Treatment/Oncology history:  Carbo/Taxol + RT completed 7/12/2023  maintenance immunotherapy (Durvalumab)-11/15/2023-present      Imaging:  PET-CT 03/10/2023:  Hypermetabolic mass in the right supra hilar region measuring 4.7 x 2.9 cm SUV of 20.8.  The mass abuts the right main pulmonary artery and right main stem bronchus.  Subcentimeter mediastinal lymph node noted.  One of LN slightly hypermetabolic with SUV of 3 and is located laterally.  Patchy hypermetabolism within the liver as suspected, however, there are few tiny focal areas of hypermetabolism out of proportion to the rest of the liver.  At least 1 of this corresponds to a low-density focus seen in the noncontrast exam.  CT 5/12/2023: changes consistent with COPD and emphysema in the lungs bilaterally with multiple bulla bleb seen. mass seen in the right hilum extending into the right upper lobe.  It is somewhat infiltrative.  This was seen on the prior PET-CT as well.  The findings are not significantly changed.  There are multiple associated satellite nodules in the right upper lobe.  Rough dimensions of the hilar component of the mass is 5.4 x 5.1 cm and rough measurements of the dominant lesion in the right upper lobe is 11 cm x 3.6 cm.  The mass extends into the right mainstem and right upper lobe bronchus.  This was seen on the prior examination as well.  There is narrowing of the right upper lobe bronchus there is some narrowing of the right upper lobe pulmonary arteries.  Findings are similar to the prior examination.  Scattered areas of punctate subcentimeter nodularity is seen in the right lower lobe.   These are too small to characterize and similar changes were seen previously.    No pleural effusion is seen.  Thoracic aorta is normal in caliber.  There is some mediastinal lymphadenopathy seen.  Reference lymph node is measured in the precarinal region on image number 39 series 2 at 1.5 x 0.9 cm.  This is similar to the prior examination.  Scattered punctate areas of hypoattenuation are seen throughout the liver.  Similar changes were seen previously.  The lesions are too small to characterize but may represent cysts.  The largest lesion is seen in segment 4.  It does not enhance and measures 2 cm x 1.5 cm.  Findings may represent a cyst.  No adrenal nodules are seen.  CT Head  @ [facility]. Mild chronic white matter changes. Old left caudate lobe lesion. 2. No evidence of metastatic disease.  CT C/A/P 9/15/2023:  1. Interval resolution of previously seen endobronchial right hilar mass.  2. Improved aeration of the right upper lobe.  There are residual but improved patchy irregular opacities in the right upper lobe, predominantly peripherally located  3. Interval improvement of mediastinal lymph nodes  4. Unchanged hepatic cyst and too small to characterize hepatic hypodensities  CT Chest 2024:  1. No detrimental change from previous exam.  2. Similar small mediastinal lymph nodes and patchy irregular opacities in the right upper lobe  3. Trace right pleural fluid, similar to previous.   Bilateral diagnostic bilateral mammogram with right breast US 2024: IMPRESSION: BENIGN  1. No mammographic evidence of malignancy in either breast.  No sonographic evidence of malignancy in the subareolar region of either breast.     2. Minimal gynecomastia in the bilateral breasts is benign.  The patient was encouraged to follow up with his primary care provider, as clinical correlation (including correlation with current medications, laboratory values, and physical examination) is recommended to determine an  underlying etiology.    CT C/A/P 4/4/2024: level 4R paratracheal lymph node measures 9 x 9 mm, enlarged compared with 5 x 5 mm previously. The superior mediastinal paraesophageal adenopathy is unchanged. The subcarinal lymphadenopathy is unchanged.   1. Interval enlargement of a level 4R paratracheal lymph nodes suspicious for progressive disease.  The remaining mediastinal lymph nodes are unchanged.  2. Stable appearance of the interlobular septal thickening and bronchiectasis in the right upper lobe favored to represent post treatment effect.  3. Slightly improved trace right pleural effusion.         Pathology:  02/10/2023:  Right upper lobe lung needle biopsy: Scattered highly atypical squamous cells, suspicious for squamous cell carcinoma in a background of abundant acute inflammation and necrosis  Right upper lobe, brushing:  Positive for malignant cells.  Squamous cell carcinoma, moderately differentiated.  Right upper lobe biopsy squamous cell carcinoma, moderately differentiated with focal keratinization.  Right lung washings positive for malignant cells.  Squamous cell carcinoma in a background of abundant necrosis.      CLINICAL HISTORY:       Patient: Deondre Ocampo IV is a 69 y.o. male.  Kindly referred for newly diagnosed squamous cell carcinoma of the lung.    He reports that he was being evaluated for a right side pain went imaging studies showed a mass in the lung.  I do not have the CTs.  But he eventually underwent bronchoscopy that showed squamous cell carcinoma.  PET-CT showed the right suprahilar mass with possible mediastinal lymphadenopathy and questionable liver lesions.  He is here to discuss further recommendations.    He is here with his wife.  Patient has history of 53 pack year smoking.  He reports that his breathing is fine.  He denies any chest pain, short of breath or coughing.  No hemoptysis.  He has an appointment to see the radiation oncologist next week.    Chief Complaint: 4  Week Follow Up      Interval History:  He completed 6 cycle of weekly Carbo/Taxol on 7/12/23 along with XRT.     Patient presents today for follow up and continuation of Imfinzi, C8 due today. He is accompanied by his wife. He continues with chronic back pain. He has had multiple back surgeries in the past. He reports having problems with blood sugar and fatigue, followed by PCP. Has follow up this month.        Past Medical History:   Diagnosis Date    Diabetes mellitus     High cholesterol     Hypertension     Lung cancer       Past Surgical History:   Procedure Laterality Date    BACK SURGERY      DEBRIDEMENT TENNIS ELBOW Right     PERIPHERALLY INSERTED CENTRAL CATHETER INSERTION Left 5/29/2023    Procedure: Insertion-picc;  Surgeon: Letha Silver MD;  Location: [facility];  Service: Oncology;  Laterality: Left;    right arm       Family History   Problem Relation Name Age of Onset    Coronary artery disease Mother      No Known Problems Father      Leukemia Daughter  42    Coronary artery disease Brother      Bone cancer Brother  35    Diabetes Brother      Heart failure Brother      Diabetes Brother      Leukemia Brother      Breast cancer Neg Hx          neg fam history of breast cancer            Review of patient's allergies indicates:   Allergen Reactions    Ace inhibitors Swelling     Other reaction(s): Angioedema, Respiratory distress    Penicillins Itching and Rash      Current Outpatient Medications on File Prior to Visit   Medication Sig Dispense Refill    aspirin (ECOTRIN) 81 MG EC tablet Take 81 mg by mouth once daily.      celecoxib (CELEBREX) 200 MG capsule Take 200 mg by mouth 2 (two) times daily.      dulaglutide (TRULICITY SUBQ) Inject into the skin.      gabapentin (NEURONTIN) 100 MG capsule Take 100 mg by mouth 3 (three) times daily.      losartan (COZAAR) 100 MG tablet Take 100 mg by mouth once daily.      metoprolol tartrate (LOPRESSOR) 50 MG tablet Take 50 mg by mouth 2 (two) times  daily.      naloxone (NARCAN) 4 mg/actuation Spry SMARTSIG:Both Nares      oxyCODONE-acetaminophen (PERCOCET)  mg per tablet Take 1 tablet by mouth every 6 (six) hours as needed for Pain. for pain. 30 tablet 0    pantoprazole (PROTONIX) 40 MG tablet Take 40 mg by mouth.      pravastatin (PRAVACHOL) 20 MG tablet Take 20 mg by mouth once daily.      [DISCONTINUED] glimepiride (AMARYL) 2 MG tablet Take 2 mg by mouth before breakfast.      [DISCONTINUED] benzonatate (TESSALON) 100 MG capsule Take 100 mg by mouth 3 (three) times daily as needed.      [DISCONTINUED] dicyclomine (BENTYL) 20 mg tablet Take 20 mg by mouth once daily. Once daily in the AM.       No current facility-administered medications on file prior to visit.      Review of Systems   Constitutional:  Positive for fatigue. Negative for activity change, appetite change, chills, diaphoresis, fever and unexpected weight change.   HENT:  Negative for nasal congestion, mouth sores, nosebleeds, sinus pressure/congestion, sore throat and trouble swallowing.    Eyes: Negative.    Respiratory:  Positive for cough (chronic but better). Negative for shortness of breath.    Cardiovascular:  Negative for chest pain and palpitations.   Gastrointestinal:  Negative for abdominal distention, abdominal pain, blood in stool, change in bowel habit, constipation, diarrhea, nausea and vomiting.   Endocrine: Negative.    Genitourinary:  Negative for bladder incontinence, decreased urine volume, difficulty urinating, dysuria, frequency, hematuria and urgency.   Musculoskeletal:  Positive for back pain (same) and neck pain (same). Negative for arthralgias, gait problem, joint swelling, leg pain and myalgias.   Integumentary:  Negative for rash.        Right breast pain (nipple)   Allergic/Immunologic: Negative.    Neurological:  Negative for dizziness, tremors, syncope, weakness, light-headedness, numbness, headaches and memory loss.   Hematological:  Negative for  "adenopathy. Does not bruise/bleed easily.   Psychiatric/Behavioral:  Negative for agitation, confusion, hallucinations, sleep disturbance and suicidal ideas. The patient is not nervous/anxious.           Vitals:    06/03/24 1103   BP: (!) 143/85   BP Location: Left arm   Patient Position: Sitting   Pulse: (!) 56   Resp: 18   Temp: 98.1 °F (36.7 °C)   TempSrc: Oral   SpO2: 98%   Weight: 98.9 kg (218 lb)   Height: 6' 1" (1.854 m)           Wt Readings from Last 6 Encounters:   06/03/24 98.9 kg (218 lb)   05/06/24 98.7 kg (217 lb 9.6 oz)   04/08/24 98.9 kg (218 lb)   03/11/24 96.1 kg (211 lb 12.8 oz)   02/12/24 96.4 kg (212 lb 9.6 oz)   02/08/24 94.8 kg (209 lb)        Body mass index is 28.76 kg/m².  Body surface area is 2.26 meters squared.  Physical Exam  Vitals and nursing note reviewed.   Constitutional:       General: He is not in acute distress.     Appearance: Normal appearance.   HENT:      Head: Normocephalic and atraumatic.      Mouth/Throat:      Mouth: Mucous membranes are moist.   Eyes:      General: No scleral icterus.     Extraocular Movements: Extraocular movements intact.      Conjunctiva/sclera: Conjunctivae normal.   Neck:      Vascular: No JVD.   Cardiovascular:      Rate and Rhythm: Normal rate and regular rhythm.      Heart sounds: No murmur heard.  Pulmonary:      Effort: Pulmonary effort is normal.      Breath sounds: Decreased breath sounds present. No wheezing or rhonchi.   Chest:   Breasts:     Right: No swelling, bleeding, inverted nipple, mass, nipple discharge or skin change.      Left: Normal.   Abdominal:      General: Bowel sounds are normal. There is no distension.      Palpations: Abdomen is soft.      Tenderness: There is no abdominal tenderness.   Musculoskeletal:         General: No swelling or deformity.      Cervical back: Neck supple.   Lymphadenopathy:      Head:      Right side of head: No submandibular adenopathy.      Left side of head: No submandibular adenopathy.      " Cervical: No cervical adenopathy.      Upper Body:      Right upper body: No supraclavicular or axillary adenopathy.      Left upper body: No supraclavicular or axillary adenopathy.      Lower Body: No right inguinal adenopathy. No left inguinal adenopathy.   Skin:     General: Skin is warm.      Coloration: Skin is not jaundiced.      Findings: No rash.   Neurological:      Mental Status: He is alert and oriented to person, place, and time.      Cranial Nerves: Cranial nerves 2-12 are intact.      Comments: Dropping on left side of his face. Left eye is more open than his right eye.    Psychiatric:         Attention and Perception: Attention normal.         Behavior: Behavior is cooperative.       ECOG SCORE    1 - Restricted in strenuous activity-ambulatory and able to carry out work of a light nature         Laboratory:  CBC with Differential:  Lab Results   Component Value Date    WBC 4.89 06/03/2024    RBC 4.01 (L) 06/03/2024    HGB 13.4 (L) 06/03/2024    HCT 38.4 (L) 06/03/2024    MCV 95.8 (H) 06/03/2024    MCH 33.4 (H) 06/03/2024    MCHC 34.9 06/03/2024    RDW 14.9 06/03/2024     06/03/2024    MPV 8.8 06/03/2024        CMP:  Sodium   Date Value Ref Range Status   06/03/2024 135 (L) 136 - 145 mmol/L Final     Potassium   Date Value Ref Range Status   06/03/2024 3.9 3.5 - 5.1 mmol/L Final     Chloride   Date Value Ref Range Status   06/03/2024 102 98 - 107 mmol/L Final     CO2   Date Value Ref Range Status   06/03/2024 28 23 - 31 mmol/L Final     Blood Urea Nitrogen   Date Value Ref Range Status   06/03/2024 14.6 8.4 - 25.7 mg/dL Final     Creatinine   Date Value Ref Range Status   06/03/2024 1.02 0.73 - 1.18 mg/dL Final     Calcium   Date Value Ref Range Status   06/03/2024 9.1 8.8 - 10.0 mg/dL Final     Albumin   Date Value Ref Range Status   06/03/2024 3.5 3.4 - 4.8 g/dL Final     Bilirubin Total   Date Value Ref Range Status   06/03/2024 1.3 <=1.5 mg/dL Final     ALP   Date Value Ref Range Status    06/03/2024 77 40 - 150 unit/L Final     AST   Date Value Ref Range Status   06/03/2024 7 5 - 34 unit/L Final     ALT   Date Value Ref Range Status   06/03/2024 6 0 - 55 unit/L Final         Assessment:       1) cT4N2?Mx- Stage III Squamous cell carcinoma of the right upper lobe  -Large hilar mass that extends to the RUL  -Completed 6 cycle of weekly Carbo/Taxol on 7/12/23 along with XRT.   -Restaging CT with GAURAV  -Surgical reevaluation -- no surgery recommended as restaging scan with GAURAV  -maintenance immunotherapy (Durvalumab)-11/15/2023-present  -Restaging CT C/A/P 4/4/2024: level 4R paratracheal lymph node measures 9 x 9 mm, enlarged compared with 5 x 5 mm previously. The superior mediastinal paraesophageal adenopathy is unchanged. The subcarinal lymphadenopathy is unchanged. The remaining mediastinal lymph nodes are unchanged. Slightly improved trace right pleural effusion.    2) Liver hypodensities--> suggesting cysts      Plan:       Patient with progression of level 4R paratracheal lymph node.  This could be inflammatory versus progression of his disease.  At this time is still small and we will continue present treatment.  We will continue to monitor closely.  If continue to grow may benefit of surgical evaluation.    Okay to proceed today with maintenance immunotherapy (Durvalumab) x 1 year - okay to administer peripherally  Continue follow ups with Dr. Hoff  CT C/A/P every 3 months - due 7/2024 - ordered  RTC in 6 weeks via telemedicine with MD for results  RTC in 4 weeks with NP for TD/labs/infusion same day - he lives in [place] - pt requests appointments @ 11 am or 1 pm  Labs: CBC, CMP, TSH - standing order placed    Encouraged to call with questions or problems  The patient was given ample opportunity to ask questions and they were all answered to satisfaction; patient demonstrated understanding of what we discussed and is agreeable to the plan.    Letha Silver,  MD  Hematology/Oncology    Professional Services   I, Jenelle Waters LPN, acted solely as a scribe for and in the presence of Dr. Letha Silver, who performed these services.

## 2024-06-28 ENCOUNTER — HOSPITAL ENCOUNTER (OUTPATIENT)
Dept: RADIOLOGY | Facility: HOSPITAL | Age: 69
Discharge: HOME | End: 2024-06-28
Attending: INTERNAL MEDICINE
Payer: MEDICARE

## 2024-06-28 DIAGNOSIS — Z51.12 MAINTENANCE ANTINEOPLASTIC IMMUNOTHERAPY: ICD-10-CM

## 2024-06-28 DIAGNOSIS — C34.11 MALIGNANT NEOPLASM OF UPPER LOBE OF RIGHT LUNG: ICD-10-CM

## 2024-06-28 DIAGNOSIS — C34.11 MALIGNANT NEOPLASM OF UPPER LOBE OF RIGHT LUNG: Primary | ICD-10-CM

## 2024-06-28 DIAGNOSIS — E03.2 DRUG-INDUCED HYPOTHYROIDISM: ICD-10-CM

## 2024-06-28 DIAGNOSIS — C34.91 SQUAMOUS CELL CARCINOMA OF RIGHT LUNG: ICD-10-CM

## 2024-06-28 PROCEDURE — 71250 CT THORAX DX C-: CPT | Mod: TC

## 2024-06-28 PROCEDURE — 25500020 PHARM REV CODE 255: Performed by: INTERNAL MEDICINE

## 2024-06-28 PROCEDURE — 74176 CT ABD & PELVIS W/O CONTRAST: CPT | Mod: TC

## 2024-06-28 RX ADMIN — DIATRIZOATE MEGLUMINE AND DIATRIZOATE SODIUM 30 ML: 600; 100 SOLUTION ORAL; RECTAL at 03:06

## 2024-07-01 ENCOUNTER — PATIENT MESSAGE (OUTPATIENT)
Dept: HEMATOLOGY/ONCOLOGY | Facility: CLINIC | Age: 69
End: 2024-07-01

## 2024-07-01 ENCOUNTER — OFFICE VISIT (OUTPATIENT)
Dept: HEMATOLOGY/ONCOLOGY | Facility: CLINIC | Age: 69
End: 2024-07-01
Payer: MEDICARE

## 2024-07-01 ENCOUNTER — LAB VISIT (OUTPATIENT)
Dept: LAB | Facility: HOSPITAL | Age: 69
End: 2024-07-01
Attending: INTERNAL MEDICINE
Payer: MEDICARE

## 2024-07-01 VITALS
RESPIRATION RATE: 20 BRPM | TEMPERATURE: 98 F | HEIGHT: 73 IN | SYSTOLIC BLOOD PRESSURE: 118 MMHG | BODY MASS INDEX: 27.82 KG/M2 | WEIGHT: 209.88 LBS | OXYGEN SATURATION: 99 % | DIASTOLIC BLOOD PRESSURE: 85 MMHG | HEART RATE: 59 BPM

## 2024-07-01 DIAGNOSIS — Z51.12 MAINTENANCE ANTINEOPLASTIC IMMUNOTHERAPY: ICD-10-CM

## 2024-07-01 DIAGNOSIS — C34.91 SQUAMOUS CELL CARCINOMA OF RIGHT LUNG: ICD-10-CM

## 2024-07-01 DIAGNOSIS — R53.83 FATIGUE, UNSPECIFIED TYPE: ICD-10-CM

## 2024-07-01 DIAGNOSIS — C34.11 MALIGNANT NEOPLASM OF UPPER LOBE OF RIGHT LUNG: Primary | ICD-10-CM

## 2024-07-01 DIAGNOSIS — C34.01 LUNG CANCER, MAIN BRONCHUS, RIGHT: ICD-10-CM

## 2024-07-01 DIAGNOSIS — E03.2 DRUG-INDUCED HYPOTHYROIDISM: ICD-10-CM

## 2024-07-01 LAB
ALBUMIN SERPL-MCNC: 3.3 G/DL (ref 3.4–4.8)
ALBUMIN/GLOB SERPL: 0.9 RATIO (ref 1.1–2)
ALP SERPL-CCNC: 81 UNIT/L (ref 40–150)
ALT SERPL-CCNC: 11 UNIT/L (ref 0–55)
ANION GAP SERPL CALC-SCNC: 7 MEQ/L
AST SERPL-CCNC: 9 UNIT/L (ref 5–34)
BASOPHILS # BLD AUTO: 0.01 X10(3)/MCL
BASOPHILS NFR BLD AUTO: 0.3 %
BILIRUB SERPL-MCNC: 0.6 MG/DL
BUN SERPL-MCNC: 12 MG/DL (ref 8.4–25.7)
CALCIUM SERPL-MCNC: 9.4 MG/DL (ref 8.8–10)
CHLORIDE SERPL-SCNC: 107 MMOL/L (ref 98–107)
CO2 SERPL-SCNC: 24 MMOL/L (ref 23–31)
CREAT SERPL-MCNC: 1.34 MG/DL (ref 0.73–1.18)
CREAT/UREA NIT SERPL: 9
EOSINOPHIL # BLD AUTO: 0.05 X10(3)/MCL (ref 0–0.9)
EOSINOPHIL NFR BLD AUTO: 1.3 %
ERYTHROCYTE [DISTWIDTH] IN BLOOD BY AUTOMATED COUNT: 15.2 % (ref 11.5–17)
GFR SERPLBLD CREATININE-BSD FMLA CKD-EPI: 57 ML/MIN/1.73/M2
GLOBULIN SER-MCNC: 3.8 GM/DL (ref 2.4–3.5)
GLUCOSE SERPL-MCNC: 113 MG/DL (ref 82–115)
HCT VFR BLD AUTO: 39.3 % (ref 42–52)
HGB BLD-MCNC: 13.2 G/DL (ref 14–18)
IMM GRANULOCYTES # BLD AUTO: 0.02 X10(3)/MCL (ref 0–0.04)
IMM GRANULOCYTES NFR BLD AUTO: 0.5 %
LYMPHOCYTES # BLD AUTO: 0.97 X10(3)/MCL (ref 0.6–4.6)
LYMPHOCYTES NFR BLD AUTO: 25.3 %
MCH RBC QN AUTO: 32.4 PG (ref 27–31)
MCHC RBC AUTO-ENTMCNC: 33.6 G/DL (ref 33–36)
MCV RBC AUTO: 96.3 FL (ref 80–94)
MONOCYTES # BLD AUTO: 0.56 X10(3)/MCL (ref 0.1–1.3)
MONOCYTES NFR BLD AUTO: 14.6 %
NEUTROPHILS # BLD AUTO: 2.22 X10(3)/MCL (ref 2.1–9.2)
NEUTROPHILS NFR BLD AUTO: 58 %
PLATELET # BLD AUTO: 223 X10(3)/MCL (ref 130–400)
PMV BLD AUTO: 11.2 FL (ref 7.4–10.4)
POTASSIUM SERPL-SCNC: 5 MMOL/L (ref 3.5–5.1)
PROT SERPL-MCNC: 7.1 GM/DL (ref 5.8–7.6)
RBC # BLD AUTO: 4.08 X10(6)/MCL (ref 4.7–6.1)
SODIUM SERPL-SCNC: 138 MMOL/L (ref 136–145)
TSH SERPL-ACNC: 3.85 UIU/ML (ref 0.35–4.94)
WBC # BLD AUTO: 3.83 X10(3)/MCL (ref 4.5–11.5)

## 2024-07-01 PROCEDURE — 3074F SYST BP LT 130 MM HG: CPT | Mod: CPTII,S$GLB,, | Performed by: NURSE PRACTITIONER

## 2024-07-01 PROCEDURE — 1126F AMNT PAIN NOTED NONE PRSNT: CPT | Mod: CPTII,S$GLB,, | Performed by: NURSE PRACTITIONER

## 2024-07-01 PROCEDURE — 1159F MED LIST DOCD IN RCRD: CPT | Mod: CPTII,S$GLB,, | Performed by: NURSE PRACTITIONER

## 2024-07-01 PROCEDURE — 3079F DIAST BP 80-89 MM HG: CPT | Mod: CPTII,S$GLB,, | Performed by: NURSE PRACTITIONER

## 2024-07-01 PROCEDURE — 99215 OFFICE O/P EST HI 40 MIN: CPT | Mod: S$GLB,,, | Performed by: NURSE PRACTITIONER

## 2024-07-01 PROCEDURE — 1160F RVW MEDS BY RX/DR IN RCRD: CPT | Mod: CPTII,S$GLB,, | Performed by: NURSE PRACTITIONER

## 2024-07-01 PROCEDURE — 85025 COMPLETE CBC W/AUTO DIFF WBC: CPT

## 2024-07-01 PROCEDURE — 99999 PR PBB SHADOW E&M-EST. PATIENT-LVL V: CPT | Mod: PBBFAC,,, | Performed by: NURSE PRACTITIONER

## 2024-07-01 PROCEDURE — 3288F FALL RISK ASSESSMENT DOCD: CPT | Mod: CPTII,S$GLB,, | Performed by: NURSE PRACTITIONER

## 2024-07-01 PROCEDURE — 1101F PT FALLS ASSESS-DOCD LE1/YR: CPT | Mod: CPTII,S$GLB,, | Performed by: NURSE PRACTITIONER

## 2024-07-01 PROCEDURE — 36415 COLL VENOUS BLD VENIPUNCTURE: CPT

## 2024-07-01 PROCEDURE — 80053 COMPREHEN METABOLIC PANEL: CPT

## 2024-07-01 PROCEDURE — 84443 ASSAY THYROID STIM HORMONE: CPT

## 2024-07-01 PROCEDURE — 3008F BODY MASS INDEX DOCD: CPT | Mod: CPTII,S$GLB,, | Performed by: NURSE PRACTITIONER

## 2024-07-01 PROCEDURE — 4010F ACE/ARB THERAPY RXD/TAKEN: CPT | Mod: CPTII,S$GLB,, | Performed by: NURSE PRACTITIONER

## 2024-07-01 RX ORDER — EPINEPHRINE 0.3 MG/.3ML
0.3 INJECTION SUBCUTANEOUS ONCE AS NEEDED
OUTPATIENT
Start: 2024-07-15

## 2024-07-01 RX ORDER — SULFAMETHOXAZOLE AND TRIMETHOPRIM 800; 160 MG/1; MG/1
1 TABLET ORAL 2 TIMES DAILY
COMMUNITY
Start: 2024-06-24

## 2024-07-01 RX ORDER — SODIUM CHLORIDE 0.9 % (FLUSH) 0.9 %
10 SYRINGE (ML) INJECTION
OUTPATIENT
Start: 2024-07-15

## 2024-07-01 RX ORDER — HEPARIN 100 UNIT/ML
500 SYRINGE INTRAVENOUS
OUTPATIENT
Start: 2024-07-15

## 2024-07-01 RX ORDER — DIPHENHYDRAMINE HYDROCHLORIDE 50 MG/ML
50 INJECTION INTRAMUSCULAR; INTRAVENOUS ONCE AS NEEDED
OUTPATIENT
Start: 2024-07-15

## 2024-07-01 NOTE — PROGRESS NOTES
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 Dx: 2/10/2023    Present treatment:  maintenance immunotherapy (Durvalumab)-11/15/2023-present    Treatment/Oncology history:  Carbo/Taxol + RT completed 7/12/2023  maintenance immunotherapy (Durvalumab)-11/15/2023-present      Imaging:  PET-CT 03/10/2023:  Hypermetabolic mass in the right supra hilar region measuring 4.7 x 2.9 cm SUV of 20.8.  The mass abuts the right main pulmonary artery and right main stem bronchus.  Subcentimeter mediastinal lymph node noted.  One of LN slightly hypermetabolic with SUV of 3 and is located laterally.  Patchy hypermetabolism within the liver as suspected, however, there are few tiny focal areas of hypermetabolism out of proportion to the rest of the liver.  At least 1 of this corresponds to a low-density focus seen in the noncontrast exam.  CT 5/12/2023: changes consistent with COPD and emphysema in the lungs bilaterally with multiple bulla bleb seen. mass seen in the right hilum extending into the right upper lobe.  It is somewhat infiltrative.  This was seen on the prior PET-CT as well.  The findings are not significantly changed.  There are multiple associated satellite nodules in the right upper lobe.  Rough dimensions of the hilar component of the mass is 5.4 x 5.1 cm and rough measurements of the dominant lesion in the right upper lobe is 11 cm x 3.6 cm.  The mass extends into the right mainstem and right upper lobe bronchus.  This was seen on the prior examination as well.  There is narrowing of the right upper lobe bronchus there is some narrowing of the right upper lobe pulmonary arteries.  Findings are similar to the prior examination.  Scattered areas of punctate subcentimeter nodularity is seen in the right lower lobe.   These are too small to characterize and similar changes were seen previously.    No pleural effusion is seen.  Thoracic aorta is normal in caliber.  There is some mediastinal lymphadenopathy seen.  Reference lymph node is measured in the precarinal region on image number 39 series 2 at 1.5 x 0.9 cm.  This is similar to the prior examination.  Scattered punctate areas of hypoattenuation are seen throughout the liver.  Similar changes were seen previously.  The lesions are too small to characterize but may represent cysts.  The largest lesion is seen in segment 4.  It does not enhance and measures 2 cm x 1.5 cm.  Findings may represent a cyst.  No adrenal nodules are seen.  CT Head  @ [facility]. Mild chronic white matter changes. Old left caudate lobe lesion. 2. No evidence of metastatic disease.  CT C/A/P 9/15/2023:  1. Interval resolution of previously seen endobronchial right hilar mass.  2. Improved aeration of the right upper lobe.  There are residual but improved patchy irregular opacities in the right upper lobe, predominantly peripherally located  3. Interval improvement of mediastinal lymph nodes  4. Unchanged hepatic cyst and too small to characterize hepatic hypodensities  CT Chest 2024:  1. No detrimental change from previous exam.  2. Similar small mediastinal lymph nodes and patchy irregular opacities in the right upper lobe  3. Trace right pleural fluid, similar to previous.   Bilateral diagnostic bilateral mammogram with right breast US 2024: IMPRESSION: BENIGN  1. No mammographic evidence of malignancy in either breast.  No sonographic evidence of malignancy in the subareolar region of either breast.     2. Minimal gynecomastia in the bilateral breasts is benign.  The patient was encouraged to follow up with his primary care provider, as clinical correlation (including correlation with current medications, laboratory values, and physical examination) is recommended to determine an  underlying etiology.    CT C/A/P 4/4/2024: level 4R paratracheal lymph node measures 9 x 9 mm, enlarged compared with 5 x 5 mm previously. The superior mediastinal paraesophageal adenopathy is unchanged. The subcarinal lymphadenopathy is unchanged.   1. Interval enlargement of a level 4R paratracheal lymph nodes suspicious for progressive disease.  The remaining mediastinal lymph nodes are unchanged.  2. Stable appearance of the interlobular septal thickening and bronchiectasis in the right upper lobe favored to represent post treatment effect.  3. Slightly improved trace right pleural effusion.         Pathology:  02/10/2023:  Right upper lobe lung needle biopsy: Scattered highly atypical squamous cells, suspicious for squamous cell carcinoma in a background of abundant acute inflammation and necrosis  Right upper lobe, brushing:  Positive for malignant cells.  Squamous cell carcinoma, moderately differentiated.  Right upper lobe biopsy squamous cell carcinoma, moderately differentiated with focal keratinization.  Right lung washings positive for malignant cells.  Squamous cell carcinoma in a background of abundant necrosis.      CLINICAL HISTORY:       Patient: Deondre Ocampo IV is a 69 y.o. male.  Kindly referred for newly diagnosed squamous cell carcinoma of the lung.    He reports that he was being evaluated for a right side pain went imaging studies showed a mass in the lung.  I do not have the CTs.  But he eventually underwent bronchoscopy that showed squamous cell carcinoma.  PET-CT showed the right suprahilar mass with possible mediastinal lymphadenopathy and questionable liver lesions.  He is here to discuss further recommendations.    He is here with his wife.  Patient has history of 53 pack year smoking.  He reports that his breathing is fine.  He denies any chest pain, short of breath or coughing.  No hemoptysis.  He has an appointment to see the radiation oncologist next week.    Chief Complaint: 4  Week Follow Up (PT states he been constipated lately. )      Interval History:    He completed 6 cycle of weekly Carbo/Taxol on 7/12/23 along with XRT.     Patient presents today for follow up and continuation of Imfinzi, C9 due today. On 6/22/24 , his wife called an ambulance to their residence because he had a temp of 103. Reportedly, he was diagnosed with pneumonia and UTI. He was inpatient for 2 days at [facility] in [place]- will request records. He is recovering bu does not feel well enough to treat today.       Past Medical History:   Diagnosis Date    Diabetes mellitus     High cholesterol     Hypertension     Lung cancer       Past Surgical History:   Procedure Laterality Date    BACK SURGERY      DEBRIDEMENT TENNIS ELBOW Right     PERIPHERALLY INSERTED CENTRAL CATHETER INSERTION Left 5/29/2023    Procedure: Insertion-picc;  Surgeon: Letha Silver MD;  Location: [facility];  Service: Oncology;  Laterality: Left;    right arm       Family History   Problem Relation Name Age of Onset    Coronary artery disease Mother      No Known Problems Father      Leukemia Daughter  42    Coronary artery disease Brother      Bone cancer Brother  35    Diabetes Brother      Heart failure Brother      Diabetes Brother      Leukemia Brother      Breast cancer Neg Hx          neg fam history of breast cancer            Review of patient's allergies indicates:   Allergen Reactions    Ace inhibitors Swelling     Other reaction(s): Angioedema, Respiratory distress    Penicillins Itching and Rash      Current Outpatient Medications on File Prior to Visit   Medication Sig Dispense Refill    aspirin (ECOTRIN) 81 MG EC tablet Take 81 mg by mouth once daily.      dulaglutide (TRULICITY SUBQ) Inject into the skin.      losartan (COZAAR) 100 MG tablet Take 100 mg by mouth once daily.      metoprolol tartrate (LOPRESSOR) 50 MG tablet Take 50 mg by mouth 2 (two) times daily.      naloxone (NARCAN) 4 mg/actuation Spry SMARTSIG:Both  Kg      oxyCODONE-acetaminophen (PERCOCET)  mg per tablet Take 1 tablet by mouth every 6 (six) hours as needed for Pain. for pain. 30 tablet 0    pantoprazole (PROTONIX) 40 MG tablet Take 40 mg by mouth.      pravastatin (PRAVACHOL) 20 MG tablet Take 20 mg by mouth once daily.      sulfamethoxazole-trimethoprim 800-160mg (BACTRIM DS) 800-160 mg Tab Take 1 tablet by mouth 2 (two) times daily.      [DISCONTINUED] celecoxib (CELEBREX) 200 MG capsule Take 200 mg by mouth 2 (two) times daily.      [DISCONTINUED] gabapentin (NEURONTIN) 100 MG capsule Take 100 mg by mouth 3 (three) times daily.       No current facility-administered medications on file prior to visit.      Review of Systems   Constitutional:  Positive for fatigue. Negative for activity change, appetite change, chills, diaphoresis, fever and unexpected weight change.   HENT:  Negative for nasal congestion, mouth sores, nosebleeds, sinus pressure/congestion, sore throat and trouble swallowing.    Eyes: Negative.    Respiratory:  Positive for cough (chronic but better). Negative for shortness of breath.    Cardiovascular:  Negative for chest pain and palpitations.   Gastrointestinal:  Negative for abdominal distention, abdominal pain, blood in stool, change in bowel habit, constipation, diarrhea, nausea and vomiting.   Endocrine: Negative.    Genitourinary:  Negative for bladder incontinence, decreased urine volume, difficulty urinating, dysuria, frequency, hematuria and urgency.   Musculoskeletal:  Positive for back pain (same) and neck pain (same). Negative for arthralgias, gait problem, joint swelling, leg pain and myalgias.   Integumentary:  Negative for rash.   Allergic/Immunologic: Negative.    Neurological:  Negative for dizziness, tremors, syncope, weakness, light-headedness, numbness, headaches and memory loss.   Hematological:  Negative for adenopathy. Does not bruise/bleed easily.   Psychiatric/Behavioral:  Negative for agitation,  "confusion, hallucinations, sleep disturbance and suicidal ideas. The patient is not nervous/anxious.           Vitals:    07/01/24 1310   BP: 118/85   BP Location: Left arm   Patient Position: Sitting   Pulse: (!) 59   Resp: 20   Temp: 98.1 °F (36.7 °C)   TempSrc: Oral   SpO2: 99%   Weight: 95.2 kg (209 lb 14.4 oz)   Height: 6' 1" (1.854 m)             Wt Readings from Last 6 Encounters:   07/01/24 95.2 kg (209 lb 14.4 oz)   06/03/24 98.9 kg (218 lb)   05/06/24 98.7 kg (217 lb 9.6 oz)   04/08/24 98.9 kg (218 lb)   03/11/24 96.1 kg (211 lb 12.8 oz)   02/12/24 96.4 kg (212 lb 9.6 oz)        Body mass index is 27.69 kg/m².  Body surface area is 2.21 meters squared.  Physical Exam  Vitals and nursing note reviewed.   Constitutional:       General: He is not in acute distress.     Appearance: Normal appearance.   HENT:      Head: Normocephalic and atraumatic.      Mouth/Throat:      Mouth: Mucous membranes are moist.   Eyes:      General: No scleral icterus.     Extraocular Movements: Extraocular movements intact.      Conjunctiva/sclera: Conjunctivae normal.   Neck:      Vascular: No JVD.   Cardiovascular:      Rate and Rhythm: Normal rate and regular rhythm.      Heart sounds: No murmur heard.  Pulmonary:      Effort: Pulmonary effort is normal.      Breath sounds: Decreased breath sounds present. No wheezing or rhonchi.   Chest:   Breasts:     Right: No swelling, bleeding, inverted nipple, mass, nipple discharge or skin change.      Left: Normal.   Abdominal:      General: Bowel sounds are normal. There is no distension.      Palpations: Abdomen is soft.      Tenderness: There is no abdominal tenderness.   Musculoskeletal:         General: No swelling or deformity.      Cervical back: Neck supple.   Lymphadenopathy:      Head:      Right side of head: No submandibular adenopathy.      Left side of head: No submandibular adenopathy.      Cervical: No cervical adenopathy.      Upper Body:      Right upper body: No " supraclavicular or axillary adenopathy.      Left upper body: No supraclavicular or axillary adenopathy.      Lower Body: No right inguinal adenopathy. No left inguinal adenopathy.   Skin:     General: Skin is warm.      Coloration: Skin is not jaundiced.      Findings: No rash.   Neurological:      Mental Status: He is alert and oriented to person, place, and time.      Cranial Nerves: Cranial nerves 2-12 are intact.      Comments: Dropping on left side of his face. Left eye is more open than his right eye.    Psychiatric:         Attention and Perception: Attention normal.         Behavior: Behavior is cooperative.     ECOG SCORE             Laboratory:  CBC with Differential:  Lab Results   Component Value Date    WBC 3.83 (L) 07/01/2024    RBC 4.08 (L) 07/01/2024    HGB 13.2 (L) 07/01/2024    HCT 39.3 (L) 07/01/2024    MCV 96.3 (H) 07/01/2024    MCH 32.4 (H) 07/01/2024    MCHC 33.6 07/01/2024    RDW 15.2 07/01/2024     07/01/2024    MPV 11.2 (H) 07/01/2024        CMP:  Sodium   Date Value Ref Range Status   07/01/2024 138 136 - 145 mmol/L Final     Potassium   Date Value Ref Range Status   07/01/2024 5.0 3.5 - 5.1 mmol/L Final     Chloride   Date Value Ref Range Status   07/01/2024 107 98 - 107 mmol/L Final     CO2   Date Value Ref Range Status   07/01/2024 24 23 - 31 mmol/L Final     Blood Urea Nitrogen   Date Value Ref Range Status   07/01/2024 12.0 8.4 - 25.7 mg/dL Final     Creatinine   Date Value Ref Range Status   07/01/2024 1.34 (H) 0.73 - 1.18 mg/dL Final     Calcium   Date Value Ref Range Status   07/01/2024 9.4 8.8 - 10.0 mg/dL Final     Albumin   Date Value Ref Range Status   07/01/2024 3.3 (L) 3.4 - 4.8 g/dL Final     Bilirubin Total   Date Value Ref Range Status   07/01/2024 0.6 <=1.5 mg/dL Final     ALP   Date Value Ref Range Status   07/01/2024 81 40 - 150 unit/L Final     AST   Date Value Ref Range Status   07/01/2024 9 5 - 34 unit/L Final     ALT   Date Value Ref Range Status   07/01/2024  11 0 - 55 unit/L Final         Assessment:       1) cT4N2?Mx- Stage III Squamous cell carcinoma of the right upper lobe  -Large hilar mass that extends to the RUL  -Completed 6 cycle of weekly Carbo/Taxol on 7/12/23 along with XRT.   -Restaging CT with GAURAV  -Surgical reevaluation -- no surgery recommended as restaging scan with GAURAV  -maintenance immunotherapy (Durvalumab)-11/15/2023-present  -Restaging CT C/A/P 4/4/2024: level 4R paratracheal lymph node measures 9 x 9 mm, enlarged compared with 5 x 5 mm previously. The superior mediastinal paraesophageal adenopathy is unchanged. The subcarinal lymphadenopathy is unchanged. The remaining mediastinal lymph nodes are unchanged. Slightly improved trace right pleural effusion.    2) Liver hypodensities--> suggesting cysts      Plan:       Patient with progression of level 4R paratracheal lymph node.  This could be inflammatory versus progression of his disease.  At this time is still small and we will continue present treatment.  We will continue to monitor closely.  If continue to grow may benefit of surgical evaluation.    HOLD maintenance immunotherapy (Durvalumab) today. He is recovering from UTI and penumonia  Continue follow ups with Dr. Hoff  CT C/A/P every 3 months - Done on 6/28/24- pending  RTC in 2  weeks with for TD/labs/infusion same day - he lives in [place] - pt requests appointments @ 11 am or 1 pm  Ordered PICC line  Labs: CBC, CMP, TSH - standing order placed    Encouraged to call with questions or problems  The patient was given ample opportunity to ask questions and they were all answered to satisfaction; patient demonstrated understanding of what we discussed and is agreeable to the plan.

## 2024-07-12 ENCOUNTER — HOSPITAL ENCOUNTER (OUTPATIENT)
Facility: HOSPITAL | Age: 69
Discharge: HOME | End: 2024-07-12
Attending: INTERNAL MEDICINE | Admitting: INTERNAL MEDICINE
Payer: MEDICARE

## 2024-07-12 DIAGNOSIS — Z51.12 MAINTENANCE ANTINEOPLASTIC IMMUNOTHERAPY: ICD-10-CM

## 2024-07-12 DIAGNOSIS — C34.11 MALIGNANT NEOPLASM OF UPPER LOBE OF RIGHT LUNG: ICD-10-CM

## 2024-07-12 DIAGNOSIS — E03.2 DRUG-INDUCED HYPOTHYROIDISM: ICD-10-CM

## 2024-07-12 PROCEDURE — C1751 CATH, INF, PER/CENT/MIDLINE: HCPCS

## 2024-07-12 PROCEDURE — 36569 INSJ PICC 5 YR+ W/O IMAGING: CPT

## 2024-07-12 PROCEDURE — 36573 INSJ PICC RS&I 5 YR+: CPT

## 2024-07-12 RX ORDER — SODIUM CHLORIDE 0.9 % (FLUSH) 0.9 %
10 SYRINGE (ML) INJECTION
Status: DISCONTINUED | OUTPATIENT
Start: 2024-07-12 | End: 2024-07-12 | Stop reason: HOSPADM

## 2024-07-12 RX ORDER — SODIUM CHLORIDE 0.9 % (FLUSH) 0.9 %
10 SYRINGE (ML) INJECTION EVERY 6 HOURS
Status: DISCONTINUED | OUTPATIENT
Start: 2024-07-12 | End: 2024-07-12 | Stop reason: HOSPADM

## 2024-07-12 NOTE — PROCEDURES
Deondre Ocampo IV is a 69 y.o. male patient.         PICC  Time out: Immediately prior to procedure a time out was called to verify the correct patient, procedure, equipment, support staff and site/side marked as required  Indications: med administration  Preparation: skin prepped with ChloraPrep  Skin prep agent dried: skin prep agent completely dried prior to procedure  Sterile barriers: all five maximum sterile barriers used - cap, mask, sterile gown, sterile gloves, and large sterile sheet  Hand hygiene: hand hygiene performed prior to central venous catheter insertion  Location details: left brachial  Catheter type: single lumen  Catheter size: 4 Fr  Catheter Length: 41cm    Ultrasound guidance: yes  Needle advanced into vessel with real time Ultrasound guidance.  Guidewire confirmed in vessel.  Sterile sheath used.            Name chey  7/12/2024

## 2024-07-15 ENCOUNTER — INFUSION (OUTPATIENT)
Dept: INFUSION THERAPY | Facility: HOSPITAL | Age: 69
End: 2024-07-15
Attending: INTERNAL MEDICINE
Payer: MEDICARE

## 2024-07-15 ENCOUNTER — LAB VISIT (OUTPATIENT)
Dept: LAB | Facility: HOSPITAL | Age: 69
End: 2024-07-15
Attending: NURSE PRACTITIONER
Payer: MEDICARE

## 2024-07-15 ENCOUNTER — OFFICE VISIT (OUTPATIENT)
Dept: HEMATOLOGY/ONCOLOGY | Facility: CLINIC | Age: 69
End: 2024-07-15
Payer: MEDICARE

## 2024-07-15 VITALS
DIASTOLIC BLOOD PRESSURE: 90 MMHG | OXYGEN SATURATION: 98 % | BODY MASS INDEX: 28.19 KG/M2 | SYSTOLIC BLOOD PRESSURE: 146 MMHG | RESPIRATION RATE: 18 BRPM | HEIGHT: 73 IN | TEMPERATURE: 98 F | HEART RATE: 56 BPM | WEIGHT: 212.69 LBS

## 2024-07-15 VITALS — WEIGHT: 212.69 LBS | BODY MASS INDEX: 28.19 KG/M2 | HEIGHT: 73 IN

## 2024-07-15 DIAGNOSIS — C34.11 MALIGNANT NEOPLASM OF UPPER LOBE OF RIGHT LUNG: Primary | ICD-10-CM

## 2024-07-15 DIAGNOSIS — Z51.12 MAINTENANCE ANTINEOPLASTIC IMMUNOTHERAPY: ICD-10-CM

## 2024-07-15 DIAGNOSIS — C34.11 MALIGNANT NEOPLASM OF UPPER LOBE OF RIGHT LUNG: ICD-10-CM

## 2024-07-15 DIAGNOSIS — E03.2 DRUG-INDUCED HYPOTHYROIDISM: ICD-10-CM

## 2024-07-15 DIAGNOSIS — C34.91 SQUAMOUS CELL CARCINOMA OF RIGHT LUNG: Primary | ICD-10-CM

## 2024-07-15 LAB
ALBUMIN SERPL-MCNC: 3.3 G/DL (ref 3.4–4.8)
ALBUMIN/GLOB SERPL: 1 RATIO (ref 1.1–2)
ALP SERPL-CCNC: 79 UNIT/L (ref 40–150)
ALT SERPL-CCNC: 6 UNIT/L (ref 0–55)
ANION GAP SERPL CALC-SCNC: 8 MEQ/L
AST SERPL-CCNC: 6 UNIT/L (ref 5–34)
BASOPHILS # BLD AUTO: 0.02 X10(3)/MCL
BASOPHILS NFR BLD AUTO: 0.4 %
BILIRUB SERPL-MCNC: 0.7 MG/DL
BUN SERPL-MCNC: 8 MG/DL (ref 8.4–25.7)
CALCIUM SERPL-MCNC: 9.1 MG/DL (ref 8.8–10)
CHLORIDE SERPL-SCNC: 106 MMOL/L (ref 98–107)
CO2 SERPL-SCNC: 26 MMOL/L (ref 23–31)
CREAT SERPL-MCNC: 0.98 MG/DL (ref 0.73–1.18)
CREAT/UREA NIT SERPL: 8
EOSINOPHIL # BLD AUTO: 0.06 X10(3)/MCL (ref 0–0.9)
EOSINOPHIL NFR BLD AUTO: 1.2 %
ERYTHROCYTE [DISTWIDTH] IN BLOOD BY AUTOMATED COUNT: 14.8 % (ref 11.5–17)
GFR SERPLBLD CREATININE-BSD FMLA CKD-EPI: >60 ML/MIN/1.73/M2
GLOBULIN SER-MCNC: 3.2 GM/DL (ref 2.4–3.5)
GLUCOSE SERPL-MCNC: 96 MG/DL (ref 82–115)
HCT VFR BLD AUTO: 36.4 % (ref 42–52)
HGB BLD-MCNC: 12.2 G/DL (ref 14–18)
IMM GRANULOCYTES # BLD AUTO: 0.01 X10(3)/MCL (ref 0–0.04)
IMM GRANULOCYTES NFR BLD AUTO: 0.2 %
LYMPHOCYTES # BLD AUTO: 0.96 X10(3)/MCL (ref 0.6–4.6)
LYMPHOCYTES NFR BLD AUTO: 19.3 %
MCH RBC QN AUTO: 32.4 PG (ref 27–31)
MCHC RBC AUTO-ENTMCNC: 33.5 G/DL (ref 33–36)
MCV RBC AUTO: 96.8 FL (ref 80–94)
MONOCYTES # BLD AUTO: 0.61 X10(3)/MCL (ref 0.1–1.3)
MONOCYTES NFR BLD AUTO: 12.2 %
NEUTROPHILS # BLD AUTO: 3.32 X10(3)/MCL (ref 2.1–9.2)
NEUTROPHILS NFR BLD AUTO: 66.7 %
PLATELET # BLD AUTO: 128 X10(3)/MCL (ref 130–400)
PMV BLD AUTO: 9.1 FL (ref 7.4–10.4)
POTASSIUM SERPL-SCNC: 3.7 MMOL/L (ref 3.5–5.1)
PROT SERPL-MCNC: 6.5 GM/DL (ref 5.8–7.6)
RBC # BLD AUTO: 3.76 X10(6)/MCL (ref 4.7–6.1)
SODIUM SERPL-SCNC: 140 MMOL/L (ref 136–145)
TSH SERPL-ACNC: 2.24 UIU/ML (ref 0.35–4.94)
WBC # BLD AUTO: 4.98 X10(3)/MCL (ref 4.5–11.5)

## 2024-07-15 PROCEDURE — 99215 OFFICE O/P EST HI 40 MIN: CPT | Mod: S$GLB,,, | Performed by: NURSE PRACTITIONER

## 2024-07-15 PROCEDURE — 99999 PR PBB SHADOW E&M-EST. PATIENT-LVL IV: CPT | Mod: PBBFAC,,, | Performed by: NURSE PRACTITIONER

## 2024-07-15 PROCEDURE — 85025 COMPLETE CBC W/AUTO DIFF WBC: CPT

## 2024-07-15 PROCEDURE — 1159F MED LIST DOCD IN RCRD: CPT | Mod: CPTII,S$GLB,, | Performed by: NURSE PRACTITIONER

## 2024-07-15 PROCEDURE — 3080F DIAST BP >= 90 MM HG: CPT | Mod: CPTII,S$GLB,, | Performed by: NURSE PRACTITIONER

## 2024-07-15 PROCEDURE — 96413 CHEMO IV INFUSION 1 HR: CPT

## 2024-07-15 PROCEDURE — 36415 COLL VENOUS BLD VENIPUNCTURE: CPT

## 2024-07-15 PROCEDURE — 1125F AMNT PAIN NOTED PAIN PRSNT: CPT | Mod: CPTII,S$GLB,, | Performed by: NURSE PRACTITIONER

## 2024-07-15 PROCEDURE — 63600175 PHARM REV CODE 636 W HCPCS: Mod: JZ,JG | Performed by: NURSE PRACTITIONER

## 2024-07-15 PROCEDURE — 4010F ACE/ARB THERAPY RXD/TAKEN: CPT | Mod: CPTII,S$GLB,, | Performed by: NURSE PRACTITIONER

## 2024-07-15 PROCEDURE — 3008F BODY MASS INDEX DOCD: CPT | Mod: CPTII,S$GLB,, | Performed by: NURSE PRACTITIONER

## 2024-07-15 PROCEDURE — 25000003 PHARM REV CODE 250: Performed by: NURSE PRACTITIONER

## 2024-07-15 PROCEDURE — 84443 ASSAY THYROID STIM HORMONE: CPT

## 2024-07-15 PROCEDURE — 1160F RVW MEDS BY RX/DR IN RCRD: CPT | Mod: CPTII,S$GLB,, | Performed by: NURSE PRACTITIONER

## 2024-07-15 PROCEDURE — 1101F PT FALLS ASSESS-DOCD LE1/YR: CPT | Mod: CPTII,S$GLB,, | Performed by: NURSE PRACTITIONER

## 2024-07-15 PROCEDURE — 80053 COMPREHEN METABOLIC PANEL: CPT

## 2024-07-15 PROCEDURE — 3077F SYST BP >= 140 MM HG: CPT | Mod: CPTII,S$GLB,, | Performed by: NURSE PRACTITIONER

## 2024-07-15 PROCEDURE — 3288F FALL RISK ASSESSMENT DOCD: CPT | Mod: CPTII,S$GLB,, | Performed by: NURSE PRACTITIONER

## 2024-07-15 RX ORDER — DIPHENHYDRAMINE HYDROCHLORIDE 50 MG/ML
50 INJECTION INTRAMUSCULAR; INTRAVENOUS ONCE AS NEEDED
Status: DISCONTINUED | OUTPATIENT
Start: 2024-07-15 | End: 2024-07-15 | Stop reason: HOSPADM

## 2024-07-15 RX ORDER — HEPARIN 100 UNIT/ML
500 SYRINGE INTRAVENOUS
Status: DISCONTINUED | OUTPATIENT
Start: 2024-07-15 | End: 2024-07-15 | Stop reason: HOSPADM

## 2024-07-15 RX ORDER — SODIUM CHLORIDE 0.9 % (FLUSH) 0.9 %
10 SYRINGE (ML) INJECTION
Status: DISCONTINUED | OUTPATIENT
Start: 2024-07-15 | End: 2024-07-15 | Stop reason: HOSPADM

## 2024-07-15 RX ORDER — EPINEPHRINE 0.3 MG/.3ML
0.3 INJECTION SUBCUTANEOUS ONCE AS NEEDED
Status: DISCONTINUED | OUTPATIENT
Start: 2024-07-15 | End: 2024-07-15 | Stop reason: HOSPADM

## 2024-07-15 RX ADMIN — SODIUM CHLORIDE 1500 MG: 9 INJECTION, SOLUTION INTRAVENOUS at 02:07

## 2024-07-15 NOTE — PROGRESS NOTES
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 Dx: 2/10/2023    Present treatment:  maintenance immunotherapy (Durvalumab)-11/15/2023-present    Treatment/Oncology history:  Carbo/Taxol + RT completed 7/12/2023  maintenance immunotherapy (Durvalumab)-11/15/2023-present      Imaging:  PET-CT 03/10/2023:  Hypermetabolic mass in the right supra hilar region measuring 4.7 x 2.9 cm SUV of 20.8.  The mass abuts the right main pulmonary artery and right main stem bronchus.  Subcentimeter mediastinal lymph node noted.  One of LN slightly hypermetabolic with SUV of 3 and is located laterally.  Patchy hypermetabolism within the liver as suspected, however, there are few tiny focal areas of hypermetabolism out of proportion to the rest of the liver.  At least 1 of this corresponds to a low-density focus seen in the noncontrast exam.  CT 5/12/2023: changes consistent with COPD and emphysema in the lungs bilaterally with multiple bulla bleb seen. mass seen in the right hilum extending into the right upper lobe.  It is somewhat infiltrative.  This was seen on the prior PET-CT as well.  The findings are not significantly changed.  There are multiple associated satellite nodules in the right upper lobe.  Rough dimensions of the hilar component of the mass is 5.4 x 5.1 cm and rough measurements of the dominant lesion in the right upper lobe is 11 cm x 3.6 cm.  The mass extends into the right mainstem and right upper lobe bronchus.  This was seen on the prior examination as well.  There is narrowing of the right upper lobe bronchus there is some narrowing of the right upper lobe pulmonary arteries.  Findings are similar to the prior examination.  Scattered areas of punctate subcentimeter nodularity is seen in the right lower lobe.   These are too small to characterize and similar changes were seen previously.    No pleural effusion is seen.  Thoracic aorta is normal in caliber.  There is some mediastinal lymphadenopathy seen.  Reference lymph node is measured in the precarinal region on image number 39 series 2 at 1.5 x 0.9 cm.  This is similar to the prior examination.  Scattered punctate areas of hypoattenuation are seen throughout the liver.  Similar changes were seen previously.  The lesions are too small to characterize but may represent cysts.  The largest lesion is seen in segment 4.  It does not enhance and measures 2 cm x 1.5 cm.  Findings may represent a cyst.  No adrenal nodules are seen.  CT Head  @ [facility]. Mild chronic white matter changes. Old left caudate lobe lesion. 2. No evidence of metastatic disease.  CT C/A/P 9/15/2023:  1. Interval resolution of previously seen endobronchial right hilar mass.  2. Improved aeration of the right upper lobe.  There are residual but improved patchy irregular opacities in the right upper lobe, predominantly peripherally located  3. Interval improvement of mediastinal lymph nodes  4. Unchanged hepatic cyst and too small to characterize hepatic hypodensities  CT Chest 2024:  1. No detrimental change from previous exam.  2. Similar small mediastinal lymph nodes and patchy irregular opacities in the right upper lobe  3. Trace right pleural fluid, similar to previous.   Bilateral diagnostic bilateral mammogram with right breast US 2024: IMPRESSION: BENIGN  1. No mammographic evidence of malignancy in either breast.  No sonographic evidence of malignancy in the subareolar region of either breast.     2. Minimal gynecomastia in the bilateral breasts is benign.  The patient was encouraged to follow up with his primary care provider, as clinical correlation (including correlation with current medications, laboratory values, and physical examination) is recommended to determine an  underlying etiology.    CT C/A/P 4/4/2024: level 4R paratracheal lymph node measures 9 x 9 mm, enlarged compared with 5 x 5 mm previously. The superior mediastinal paraesophageal adenopathy is unchanged. The subcarinal lymphadenopathy is unchanged.   1. Interval enlargement of a level 4R paratracheal lymph nodes suspicious for progressive disease.  The remaining mediastinal lymph nodes are unchanged.  2. Stable appearance of the interlobular septal thickening and bronchiectasis in the right upper lobe favored to represent post treatment effect.  3. Slightly improved trace right pleural effusion.         Pathology:  02/10/2023:  Right upper lobe lung needle biopsy: Scattered highly atypical squamous cells, suspicious for squamous cell carcinoma in a background of abundant acute inflammation and necrosis  Right upper lobe, brushing:  Positive for malignant cells.  Squamous cell carcinoma, moderately differentiated.  Right upper lobe biopsy squamous cell carcinoma, moderately differentiated with focal keratinization.  Right lung washings positive for malignant cells.  Squamous cell carcinoma in a background of abundant necrosis.      CLINICAL HISTORY:       Patient: Deondre Ocampo IV is a 69 y.o. male.  Kindly referred for newly diagnosed squamous cell carcinoma of the lung.    He reports that he was being evaluated for a right side pain went imaging studies showed a mass in the lung.  I do not have the CTs.  But he eventually underwent bronchoscopy that showed squamous cell carcinoma.  PET-CT showed the right suprahilar mass with possible mediastinal lymphadenopathy and questionable liver lesions.  He is here to discuss further recommendations.    He is here with his wife.  Patient has history of 53 pack year smoking.  He reports that his breathing is fine.  He denies any chest pain, short of breath or coughing.  No hemoptysis.  He has an appointment to see the radiation oncologist next week.    Chief Complaint: 2  weeks follow up      Interval History:    He completed 6 cycle of weekly Carbo/Taxol on 7/12/23 along with XRT.     Patient presents today for follow up and continuation of Imfinzi, C9 due two weeks ago but delayed due to hospitalization for pneumonia and UTI. On 6/22/24 , his wife called an ambulance to their residence because he had a temp of 103. Reportedly, he was diagnosed with pneumonia and UTI. He was inpatient for 2 days at [facility] in [place]. He has since recovered.     Past Medical History:   Diagnosis Date    Diabetes mellitus     High cholesterol     Hypertension     Lung cancer       Past Surgical History:   Procedure Laterality Date    BACK SURGERY      DEBRIDEMENT TENNIS ELBOW Right     PERIPHERALLY INSERTED CENTRAL CATHETER INSERTION Left 5/29/2023    Procedure: Insertion-picc;  Surgeon: Letha Silver MD;  Location: [facility];  Service: Oncology;  Laterality: Left;    PERIPHERALLY INSERTED CENTRAL CATHETER INSERTION Left 7/12/2024    Procedure: Insertion-picc;  Surgeon: Letha Silver MD;  Location: [facility] OR;  Service: Oncology;  Laterality: Left;    right arm       Family History   Problem Relation Name Age of Onset    Coronary artery disease Mother      No Known Problems Father      Leukemia Daughter  42    Coronary artery disease Brother      Bone cancer Brother  35    Diabetes Brother      Heart failure Brother      Diabetes Brother      Leukemia Brother      Breast cancer Neg Hx          neg fam history of breast cancer            Review of patient's allergies indicates:   Allergen Reactions    Ace inhibitors Swelling     Other reaction(s): Angioedema, Respiratory distress    Penicillins Itching and Rash      Current Outpatient Medications on File Prior to Visit   Medication Sig Dispense Refill    aspirin (ECOTRIN) 81 MG EC tablet Take 81 mg by mouth once daily.      losartan (COZAAR) 100 MG tablet Take 100 mg by mouth once daily.      metoprolol tartrate (LOPRESSOR) 50 MG tablet  Take 50 mg by mouth 2 (two) times daily.      naloxone (NARCAN) 4 mg/actuation Spry SMARTSIG:Both Nares      pantoprazole (PROTONIX) 40 MG tablet Take 40 mg by mouth once daily.      pravastatin (PRAVACHOL) 20 MG tablet Take 20 mg by mouth once daily.      dulaglutide (TRULICITY SUBQ) Inject into the skin. (Patient not taking: Reported on 7/15/2024)      oxyCODONE-acetaminophen (PERCOCET)  mg per tablet Take 1 tablet by mouth every 6 (six) hours as needed for Pain. for pain. (Patient not taking: Reported on 7/15/2024) 30 tablet 0    [DISCONTINUED] sulfamethoxazole-trimethoprim 800-160mg (BACTRIM DS) 800-160 mg Tab Take 1 tablet by mouth 2 (two) times daily.       No current facility-administered medications on file prior to visit.      Review of Systems   Constitutional:  Positive for fatigue. Negative for activity change, appetite change, chills, diaphoresis, fever and unexpected weight change.   HENT:  Negative for nasal congestion, mouth sores, nosebleeds, sinus pressure/congestion, sore throat and trouble swallowing.    Eyes: Negative.    Respiratory:  Positive for cough (chronic but better). Negative for shortness of breath.    Cardiovascular:  Negative for chest pain and palpitations.   Gastrointestinal:  Negative for abdominal distention, abdominal pain, blood in stool, change in bowel habit, constipation, diarrhea, nausea and vomiting.   Endocrine: Negative.    Genitourinary:  Negative for bladder incontinence, decreased urine volume, difficulty urinating, dysuria, frequency, hematuria and urgency.   Musculoskeletal:  Positive for back pain (same) and neck pain (same). Negative for arthralgias, gait problem, joint swelling, leg pain and myalgias.   Integumentary:  Negative for rash.   Allergic/Immunologic: Negative.    Neurological:  Negative for dizziness, tremors, syncope, weakness, light-headedness, numbness, headaches and memory loss.   Hematological:  Negative for adenopathy. Does not bruise/bleed  "easily.   Psychiatric/Behavioral:  Negative for agitation, confusion, hallucinations, sleep disturbance and suicidal ideas. The patient is not nervous/anxious.           Vitals:    07/15/24 1245   BP: (!) 146/90   BP Location: Left arm   Patient Position: Sitting   BP Method: Medium (Automatic)   Pulse: (!) 56   Resp: 18   Temp: 98 °F (36.7 °C)   TempSrc: Oral   SpO2: 98%   Weight: 96.5 kg (212 lb 11.2 oz)   Height: 6' 1" (1.854 m)             Wt Readings from Last 6 Encounters:   07/15/24 96.5 kg (212 lb 11.2 oz)   07/01/24 95.2 kg (209 lb 14.4 oz)   06/03/24 98.9 kg (218 lb)   05/06/24 98.7 kg (217 lb 9.6 oz)   04/08/24 98.9 kg (218 lb)   03/11/24 96.1 kg (211 lb 12.8 oz)        Body mass index is 28.06 kg/m².  Body surface area is 2.23 meters squared.  Physical Exam  Vitals and nursing note reviewed.   Constitutional:       General: He is not in acute distress.     Appearance: Normal appearance.   HENT:      Head: Normocephalic and atraumatic.      Mouth/Throat:      Mouth: Mucous membranes are moist.   Eyes:      General: No scleral icterus.     Extraocular Movements: Extraocular movements intact.      Conjunctiva/sclera: Conjunctivae normal.   Neck:      Vascular: No JVD.   Cardiovascular:      Rate and Rhythm: Normal rate and regular rhythm.      Heart sounds: No murmur heard.  Pulmonary:      Effort: Pulmonary effort is normal.      Breath sounds: Decreased breath sounds present. No wheezing or rhonchi.   Chest:   Breasts:     Right: No swelling, bleeding, inverted nipple, mass, nipple discharge or skin change.      Left: Normal.   Abdominal:      General: Bowel sounds are normal. There is no distension.      Palpations: Abdomen is soft.      Tenderness: There is no abdominal tenderness.   Musculoskeletal:         General: No swelling or deformity.      Cervical back: Neck supple.   Lymphadenopathy:      Head:      Right side of head: No submandibular adenopathy.      Left side of head: No submandibular " adenopathy.      Cervical: No cervical adenopathy.      Upper Body:      Right upper body: No supraclavicular or axillary adenopathy.      Left upper body: No supraclavicular or axillary adenopathy.      Lower Body: No right inguinal adenopathy. No left inguinal adenopathy.   Skin:     General: Skin is warm.      Coloration: Skin is not jaundiced.      Findings: No rash.   Neurological:      Mental Status: He is alert and oriented to person, place, and time.      Cranial Nerves: Cranial nerves 2-12 are intact.      Comments: Dropping on left side of his face. Left eye is more open than his right eye.    Psychiatric:         Attention and Perception: Attention normal.         Behavior: Behavior is cooperative.       ECOG SCORE             Laboratory:  CBC with Differential:  Lab Results   Component Value Date    WBC 4.98 07/15/2024    RBC 3.76 (L) 07/15/2024    HGB 12.2 (L) 07/15/2024    HCT 36.4 (L) 07/15/2024    MCV 96.8 (H) 07/15/2024    MCH 32.4 (H) 07/15/2024    MCHC 33.5 07/15/2024    RDW 14.8 07/15/2024     (L) 07/15/2024    MPV 9.1 07/15/2024        CMP:  Sodium   Date Value Ref Range Status   07/01/2024 138 136 - 145 mmol/L Final     Potassium   Date Value Ref Range Status   07/01/2024 5.0 3.5 - 5.1 mmol/L Final     Chloride   Date Value Ref Range Status   07/01/2024 107 98 - 107 mmol/L Final     CO2   Date Value Ref Range Status   07/01/2024 24 23 - 31 mmol/L Final     Blood Urea Nitrogen   Date Value Ref Range Status   07/01/2024 12.0 8.4 - 25.7 mg/dL Final     Creatinine   Date Value Ref Range Status   07/01/2024 1.34 (H) 0.73 - 1.18 mg/dL Final     Calcium   Date Value Ref Range Status   07/01/2024 9.4 8.8 - 10.0 mg/dL Final     Albumin   Date Value Ref Range Status   07/01/2024 3.3 (L) 3.4 - 4.8 g/dL Final     Bilirubin Total   Date Value Ref Range Status   07/01/2024 0.6 <=1.5 mg/dL Final     ALP   Date Value Ref Range Status   07/01/2024 81 40 - 150 unit/L Final     AST   Date Value Ref Range  Status   07/01/2024 9 5 - 34 unit/L Final     ALT   Date Value Ref Range Status   07/01/2024 11 0 - 55 unit/L Final         Assessment:       1) cT4N2?Mx- Stage III Squamous cell carcinoma of the right upper lobe  -Large hilar mass that extends to the RUL  -Completed 6 cycle of weekly Carbo/Taxol on 7/12/23 along with XRT.   -Restaging CT with GAURAV  -Surgical reevaluation -- no surgery recommended as restaging scan with GAURAV  -maintenance immunotherapy (Durvalumab)-11/15/2023-present  -Restaging CT C/A/P 4/4/2024: level 4R paratracheal lymph node measures 9 x 9 mm, enlarged compared with 5 x 5 mm previously. The superior mediastinal paraesophageal adenopathy is unchanged. The subcarinal lymphadenopathy is unchanged. The remaining mediastinal lymph nodes are unchanged. Slightly improved trace right pleural effusion.    2) Liver hypodensities--> suggesting cysts      Plan:       Patient with progression of level 4R paratracheal lymph node.  This could be inflammatory versus progression of his disease.  At this time is still small and we will continue present treatment.  We will continue to monitor closely.  If continue to grow may benefit of surgical evaluation.    Proceed with C9 immunotherapy (Durvalumab) today.   Continue follow ups with Dr. Hoff  CT C/A/P every 3 months - Done on 6/28/24- IMPROVED.   RTC in 4  weeks with for TD/labs/infusion same day - he lives in [place] - pt requests appointments @ 11 am or 1 pm  PICC line dressing changes qweek.   Labs: CBC, CMP, TSH - standing order placed    Encouraged to call with questions or problems  The patient was given ample opportunity to ask questions and they were all answered to satisfaction; patient demonstrated understanding of what we discussed and is agreeable to the plan.

## 2024-07-22 ENCOUNTER — INFUSION (OUTPATIENT)
Dept: INFUSION THERAPY | Facility: HOSPITAL | Age: 69
End: 2024-07-22
Attending: INTERNAL MEDICINE
Payer: MEDICARE

## 2024-07-22 PROCEDURE — 96523 IRRIG DRUG DELIVERY DEVICE: CPT

## 2024-07-22 NOTE — PLAN OF CARE
Department of Anesthesiology  Preprocedure Note       Name:  Davey Henry   Age:  79 y.o.  :  1932                                          MRN:  [id number]         Date:  2022      Surgeon: Evie Prabhakar):  Missy Garvey MD    Procedure: Procedure(s):  EGD DILATATION    Medications prior to admission:   Prior to Admission medications    Medication Sig Start Date End Date Taking? Authorizing Provider   traMADol (ULTRAM) 50 MG tablet Take 50 mg by mouth every 6 hours as needed for Pain. Yes Historical Provider, MD   LORazepam (ATIVAN) 0.5 MG tablet take 1 tablet by mouth every 6 hours if needed for anxiety 12/3/22 1/23/23  Evaristo Meeks MD   gabapentin (NEURONTIN) 300 MG capsule take 1 capsule by mouth NIGHTLY 10/20/22 4/18/23  Evaristo Meeks MD   pantoprazole (PROTONIX) 40 MG tablet take 1 tablet by mouth twice a day 30 MINUTES PRIOR TO BREAKFAST AND DINNER 10/12/22   Evaristo Meeks MD   mirabegron (MYRBETRIQ) 50 MG TB24 take 1 tablet by mouth once daily 22   Evaristo Meeks MD   levothyroxine (SYNTHROID) 50 MCG tablet take 1 tablet by mouth once daily 22   Evaristo Meeks MD   PARoxetine (PAXIL) 10 MG tablet Take 1 tablet by mouth daily 22   Evaristo Meeks MD   ipratropium (ATROVENT) 0.03 % nasal spray 2 sprays by Each Nostril route every 12 hours 22   Evaristo Meeks MD   Multiple Vitamins-Minerals (PRESERVISION/LUTEIN) CAPS Take 1 capsule by mouth 2 times daily.       Historical Provider, MD       Current medications:    Current Facility-Administered Medications   Medication Dose Route Frequency Provider Last Rate Last Admin    sodium chloride flush 0.9 % injection 5-40 mL  5-40 mL IntraVENous 2 times per day Missy Garvey MD        sodium chloride flush 0.9 % injection 5-40 mL  5-40 mL IntraVENous PRN Missy Garvey MD        0.9 % sodium chloride infusion  25 mL IntraVENous PRN Missy Garvey  mL/hr at 22 1207 25 mL PICC line dressing change done in sterile fashion, no complications, discharged home     at 12/07/22 1207       Allergies: Allergies   Allergen Reactions    Amoxicillin-Pot Clavulanate Diarrhea    Macrobid [Nitrofurantoin]     Statins Support Therapy      Unaware of allery    Sulfa Antibiotics Nausea Only       Problem List:    Patient Active Problem List   Diagnosis Code    Anxiety F41.9    Osteoarthritis M19.90    Depression F32. A    GERD (gastroesophageal reflux disease) K21.9    Hyperlipidemia E78.5    Osteoporosis M81.0    Neuropathy, peripheral G62.9    Vitamin D deficiency E55.9    Hypothyroid E03.9    Senile osteoporosis M81.0    Age-related osteoporosis without current pathological fracture M81.0    Chronic midline low back pain without sciatica M54.50, G89.29    Gastric ulcer K25.9    Incisional hernia of anterior abdominal wall without obstruction or gangrene K43.2    Neurogenic bladder N31.9    Nonexudative age-related macular degeneration of right eye H35.3110    Exudative age-related macular degeneration of left eye ([facility]) H35.3220    Recurrent major depressive disorder, in partial remission ([facility]) F33.41    SI (sacroiliac) joint inflammation ([facility]) M46.1    Chronic renal disease, stage III ([address] [[id number]] N18.30       Past Medical History:        Diagnosis Date    Acute blood loss as cause of postoperative anemia 12/2/2017    Alcohol abuse sober since Charles Pizarro    Depression     Gastric ulcer 11/2017     nathanael     GERD (gastroesophageal reflux disease)     PUD 1998 Dr. Karla Boland    Hyperlipidemia     Hypertension 12/3/2017    Hypertension 12/3/2017    Hypothyroid     Incisional hernia of anterior abdominal wall without obstruction or gangrene     Neuropathy, peripheral     Osteoarthritis     Osteoporosis     Pneumoperitoneum 11/29/2017    Thyroid disease     hypothyroid    Vitamin D deficiency        Past Surgical History:        Procedure Laterality Date    CHOLECYSTECTOMY      COLONOSCOPY      [id number]    ENDOSCOPY, COLON, DIAGNOSTIC      [facility]    EYE SURGERY      HEMORRHOID SURGERY      HERNIA REPAIR      left    JOINT REPLACEMENT      bilat knees. Dr. Venice Martinez N/A 2017    EXPLORATORY LAPAROTOMY, PROBABLE ULCER REPAIR performed by Lev Russell DO at [facility]  2017    ulcer  repair   wisser     TONSILLECTOMY      UPPER GASTROINTESTINAL ENDOSCOPY        [facility]       Social History:    Social History     Tobacco Use    Smoking status: Former     Types: Cigarettes     Quit date: 1963     Years since quittin.9    Smokeless tobacco: Never   Substance Use Topics    Alcohol use: No     Comment: former alcoholic sober since                                 Counseling given: Not Answered      Vital Signs (Current):   Vitals:    22 1152   BP: (!) 164/73   Pulse: 69   Resp: 18   Temp: 36.8 °C (98.2 °F)   TempSrc: Temporal   SpO2: 97%   Weight: 109 lb 6.4 oz (49.6 kg)   Height: 5' (1.524 m)                                              BP Readings from Last 3 Encounters:   22 (!) 164/73   10/19/22 102/66   22 118/61       NPO Status: Time of last liquid consumption: 0900 (sip of water with pill)                        Time of last solid consumption: 1830                        Date of last liquid consumption: 22                        Date of last solid food consumption: 22    BMI:   Wt Readings from Last 3 Encounters:   22 109 lb 6.4 oz (49.6 kg)   10/19/22 110 lb 4 oz (50 kg)   22 114 lb 3.2 oz (51.8 kg)     Body mass index is 21.37 kg/m².     CBC:   Lab Results   Component Value Date/Time    WBC 4.2 2021 11:00 AM    WBC 2.8 2018 01:16 PM    RBC 3.92 2021 11:00 AM    HGB 12.1 2021 11:00 AM    HCT 36.3 2021 11:00 AM    MCV 93 2021 11:00 AM    RDW 13.9 2021 11:00 AM     2021 11:00 AM     2018 01:16 PM       CMP: Lab Results   Component Value Date/Time     06/24/2022 11:34 AM    K 4.5 06/24/2022 11:34 AM     06/24/2022 11:34 AM    CO2 27 06/24/2022 11:34 AM    BUN 18 06/24/2022 11:34 AM    CREATININE 1.1 06/24/2022 11:34 AM    LABGLOM 47 06/24/2022 11:34 AM    GLUCOSE 81 06/24/2022 11:34 AM    GLUCOSE 86 04/30/2021 11:00 AM    PROT 6.7 06/24/2022 11:34 AM    CALCIUM 9.8 06/24/2022 11:34 AM    BILITOT 0.4 06/24/2022 11:34 AM    ALKPHOS 79 06/24/2022 11:34 AM    AST 26 06/24/2022 11:34 AM    ALT 13 06/24/2022 11:34 AM       POC Tests: No results for input(s): POCGLU, POCNA, POCK, POCCL, POCBUN, POCHEMO, POCHCT in the last 72 hours. Coags: No results found for: PROTIME, INR, APTT    HCG (If Applicable): No results found for: PREGTESTUR, PREGSERUM, HCG, HCGQUANT     ABGs: No results found for: PHART, PO2ART, CNS3YAP, FBS5JWT, BEART, X4YLYGZT     Type & Screen (If Applicable):  No results found for: LABABO, LABRH    Drug/Infectious Status (If Applicable):  No results found for: HIV, HEPCAB    COVID-19 Screening (If Applicable): No results found for: COVID19        Anesthesia Evaluation  Patient summary reviewed  Airway: Mallampati: II  TM distance: >3 FB   Neck ROM: full  Mouth opening: > = 3 FB   Dental: normal exam         Pulmonary:normal exam  breath sounds clear to auscultation                             Cardiovascular:  Exercise tolerance: good (>4 METS),   (+) hypertension: no interval change,         Rhythm: regular  Rate: normal                    Neuro/Psych:   (+) neuromuscular disease:, psychiatric history: stable with treatment            GI/Hepatic/Renal:   (+) GERD: well controlled,          ROS comment: H/O \"choking\". Endo/Other:              Pt had no PAT visit       Abdominal:       Abdomen: soft. Vascular: negative vascular ROS. Other Findings:           Anesthesia Plan      MAC     ASA 2       Induction: intravenous.       Anesthetic plan and risks discussed with patient. Plan discussed with CRNA.                     KARTHIKEYAN Coppola - CITLALI   12/7/2022

## 2024-07-29 RX ORDER — SODIUM CHLORIDE 0.9 % (FLUSH) 0.9 %
10 SYRINGE (ML) INJECTION
Status: CANCELLED | OUTPATIENT
Start: 2024-07-30

## 2024-07-29 RX ORDER — HEPARIN 100 UNIT/ML
500 SYRINGE INTRAVENOUS
Status: CANCELLED | OUTPATIENT
Start: 2024-07-30

## 2024-07-30 ENCOUNTER — INFUSION (OUTPATIENT)
Dept: INFUSION THERAPY | Facility: HOSPITAL | Age: 69
End: 2024-07-30
Attending: INTERNAL MEDICINE
Payer: MEDICARE

## 2024-07-30 VITALS
HEART RATE: 68 BPM | BODY MASS INDEX: 28.65 KG/M2 | SYSTOLIC BLOOD PRESSURE: 147 MMHG | WEIGHT: 216.19 LBS | RESPIRATION RATE: 16 BRPM | DIASTOLIC BLOOD PRESSURE: 85 MMHG | TEMPERATURE: 98 F | HEIGHT: 73 IN | OXYGEN SATURATION: 97 %

## 2024-07-30 DIAGNOSIS — C34.11 MALIGNANT NEOPLASM OF UPPER LOBE OF RIGHT LUNG: ICD-10-CM

## 2024-07-30 DIAGNOSIS — Z51.12 MAINTENANCE ANTINEOPLASTIC IMMUNOTHERAPY: ICD-10-CM

## 2024-07-30 DIAGNOSIS — C34.01 LUNG CANCER, MAIN BRONCHUS, RIGHT: Primary | ICD-10-CM

## 2024-07-30 DIAGNOSIS — C34.91 SQUAMOUS CELL CARCINOMA OF RIGHT LUNG: ICD-10-CM

## 2024-07-30 PROCEDURE — 96523 IRRIG DRUG DELIVERY DEVICE: CPT

## 2024-07-30 PROCEDURE — 63600175 PHARM REV CODE 636 W HCPCS: Mod: JZ,JG | Performed by: NURSE PRACTITIONER

## 2024-07-30 PROCEDURE — 63600175 PHARM REV CODE 636 W HCPCS

## 2024-07-30 PROCEDURE — 36593 DECLOT VASCULAR DEVICE: CPT

## 2024-07-30 PROCEDURE — A4216 STERILE WATER/SALINE, 10 ML: HCPCS

## 2024-07-30 PROCEDURE — 25000003 PHARM REV CODE 250

## 2024-07-30 RX ORDER — SODIUM CHLORIDE 0.9 % (FLUSH) 0.9 %
10 SYRINGE (ML) INJECTION
OUTPATIENT
Start: 2024-07-30

## 2024-07-30 RX ORDER — SODIUM CHLORIDE 0.9 % (FLUSH) 0.9 %
10 SYRINGE (ML) INJECTION
Status: COMPLETED | OUTPATIENT
Start: 2024-07-30 | End: 2024-07-30

## 2024-07-30 RX ORDER — HEPARIN 100 UNIT/ML
500 SYRINGE INTRAVENOUS
OUTPATIENT
Start: 2024-07-30

## 2024-07-30 RX ORDER — HEPARIN 100 UNIT/ML
500 SYRINGE INTRAVENOUS
Status: COMPLETED | OUTPATIENT
Start: 2024-07-30 | End: 2024-07-30

## 2024-07-30 RX ADMIN — ALTEPLASE 2 MG: 2.2 INJECTION, POWDER, LYOPHILIZED, FOR SOLUTION INTRAVENOUS at 02:07

## 2024-07-30 RX ADMIN — SODIUM CHLORIDE, PRESERVATIVE FREE 10 ML: 5 INJECTION INTRAVENOUS at 01:07

## 2024-07-30 RX ADMIN — HEPARIN 500 UNITS: 100 SYRINGE at 01:07

## 2024-08-06 ENCOUNTER — INFUSION (OUTPATIENT)
Dept: INFUSION THERAPY | Facility: HOSPITAL | Age: 69
End: 2024-08-06
Attending: INTERNAL MEDICINE
Payer: MEDICARE

## 2024-08-06 VITALS
HEIGHT: 73 IN | HEART RATE: 101 BPM | SYSTOLIC BLOOD PRESSURE: 150 MMHG | BODY MASS INDEX: 28.65 KG/M2 | DIASTOLIC BLOOD PRESSURE: 79 MMHG | TEMPERATURE: 98 F | RESPIRATION RATE: 18 BRPM | WEIGHT: 216.19 LBS

## 2024-08-06 DIAGNOSIS — C34.11 MALIGNANT NEOPLASM OF UPPER LOBE OF RIGHT LUNG: ICD-10-CM

## 2024-08-06 DIAGNOSIS — Z51.12 MAINTENANCE ANTINEOPLASTIC IMMUNOTHERAPY: ICD-10-CM

## 2024-08-06 DIAGNOSIS — C34.91 SQUAMOUS CELL CARCINOMA OF RIGHT LUNG: ICD-10-CM

## 2024-08-06 DIAGNOSIS — C34.01 LUNG CANCER, MAIN BRONCHUS, RIGHT: Primary | ICD-10-CM

## 2024-08-06 PROCEDURE — 63600175 PHARM REV CODE 636 W HCPCS

## 2024-08-06 PROCEDURE — 96523 IRRIG DRUG DELIVERY DEVICE: CPT

## 2024-08-06 RX ORDER — HEPARIN 100 UNIT/ML
500 SYRINGE INTRAVENOUS
Status: COMPLETED | OUTPATIENT
Start: 2024-08-06 | End: 2024-08-06

## 2024-08-06 RX ORDER — SODIUM CHLORIDE 0.9 % (FLUSH) 0.9 %
10 SYRINGE (ML) INJECTION
Status: DISCONTINUED | OUTPATIENT
Start: 2024-08-06 | End: 2024-08-06 | Stop reason: HOSPADM

## 2024-08-06 RX ORDER — SODIUM CHLORIDE 0.9 % (FLUSH) 0.9 %
10 SYRINGE (ML) INJECTION
OUTPATIENT
Start: 2024-08-06

## 2024-08-06 RX ORDER — HEPARIN 100 UNIT/ML
500 SYRINGE INTRAVENOUS
OUTPATIENT
Start: 2024-08-06

## 2024-08-06 RX ORDER — HEPARIN 100 UNIT/ML
500 SYRINGE INTRAVENOUS
Status: DISCONTINUED | OUTPATIENT
Start: 2024-08-06 | End: 2024-08-06 | Stop reason: HOSPADM

## 2024-08-06 RX ADMIN — HEPARIN 500 UNITS: 100 SYRINGE at 10:08

## 2024-08-12 ENCOUNTER — INFUSION (OUTPATIENT)
Dept: INFUSION THERAPY | Facility: HOSPITAL | Age: 69
End: 2024-08-12
Attending: INTERNAL MEDICINE
Payer: MEDICARE

## 2024-08-12 ENCOUNTER — LAB VISIT (OUTPATIENT)
Dept: LAB | Facility: HOSPITAL | Age: 69
End: 2024-08-12
Attending: INTERNAL MEDICINE
Payer: MEDICARE

## 2024-08-12 ENCOUNTER — OFFICE VISIT (OUTPATIENT)
Dept: HEMATOLOGY/ONCOLOGY | Facility: CLINIC | Age: 69
End: 2024-08-12
Payer: MEDICARE

## 2024-08-12 VITALS
TEMPERATURE: 98 F | DIASTOLIC BLOOD PRESSURE: 86 MMHG | WEIGHT: 221.31 LBS | HEART RATE: 63 BPM | SYSTOLIC BLOOD PRESSURE: 156 MMHG | HEIGHT: 73 IN | RESPIRATION RATE: 17 BRPM | BODY MASS INDEX: 29.33 KG/M2 | OXYGEN SATURATION: 98 %

## 2024-08-12 VITALS — HEIGHT: 73 IN | BODY MASS INDEX: 29.33 KG/M2 | WEIGHT: 221.31 LBS

## 2024-08-12 DIAGNOSIS — E03.2 DRUG-INDUCED HYPOTHYROIDISM: ICD-10-CM

## 2024-08-12 DIAGNOSIS — C34.11 MALIGNANT NEOPLASM OF UPPER LOBE OF RIGHT LUNG: Primary | ICD-10-CM

## 2024-08-12 DIAGNOSIS — C34.91 SQUAMOUS CELL CARCINOMA OF RIGHT LUNG: Primary | ICD-10-CM

## 2024-08-12 DIAGNOSIS — R53.83 FATIGUE, UNSPECIFIED TYPE: ICD-10-CM

## 2024-08-12 DIAGNOSIS — C34.91 SQUAMOUS CELL CARCINOMA OF RIGHT LUNG: ICD-10-CM

## 2024-08-12 DIAGNOSIS — Z51.12 MAINTENANCE ANTINEOPLASTIC IMMUNOTHERAPY: ICD-10-CM

## 2024-08-12 DIAGNOSIS — C34.01 LUNG CANCER, MAIN BRONCHUS, RIGHT: ICD-10-CM

## 2024-08-12 LAB
ALBUMIN SERPL-MCNC: 3.5 G/DL (ref 3.4–4.8)
ALBUMIN/GLOB SERPL: 1.1 RATIO (ref 1.1–2)
ALP SERPL-CCNC: 77 UNIT/L (ref 40–150)
ALT SERPL-CCNC: <5 UNIT/L (ref 0–55)
ANION GAP SERPL CALC-SCNC: 8 MEQ/L
AST SERPL-CCNC: 7 UNIT/L (ref 5–34)
BASOPHILS # BLD AUTO: 0.01 X10(3)/MCL
BASOPHILS NFR BLD AUTO: 0.2 %
BILIRUB SERPL-MCNC: 0.6 MG/DL
BUN SERPL-MCNC: 10 MG/DL (ref 8.4–25.7)
CALCIUM SERPL-MCNC: 9.3 MG/DL (ref 8.8–10)
CHLORIDE SERPL-SCNC: 104 MMOL/L (ref 98–107)
CO2 SERPL-SCNC: 26 MMOL/L (ref 23–31)
CREAT SERPL-MCNC: 1.02 MG/DL (ref 0.73–1.18)
CREAT/UREA NIT SERPL: 10
EOSINOPHIL # BLD AUTO: 0.03 X10(3)/MCL (ref 0–0.9)
EOSINOPHIL NFR BLD AUTO: 0.6 %
ERYTHROCYTE [DISTWIDTH] IN BLOOD BY AUTOMATED COUNT: 14.3 % (ref 11.5–17)
GFR SERPLBLD CREATININE-BSD FMLA CKD-EPI: >60 ML/MIN/1.73/M2
GLOBULIN SER-MCNC: 3.3 GM/DL (ref 2.4–3.5)
GLUCOSE SERPL-MCNC: 95 MG/DL (ref 82–115)
HCT VFR BLD AUTO: 37 % (ref 42–52)
HGB BLD-MCNC: 12.5 G/DL (ref 14–18)
IMM GRANULOCYTES # BLD AUTO: 0.01 X10(3)/MCL (ref 0–0.04)
IMM GRANULOCYTES NFR BLD AUTO: 0.2 %
LYMPHOCYTES # BLD AUTO: 0.78 X10(3)/MCL (ref 0.6–4.6)
LYMPHOCYTES NFR BLD AUTO: 16.4 %
MCH RBC QN AUTO: 33.2 PG (ref 27–31)
MCHC RBC AUTO-ENTMCNC: 33.8 G/DL (ref 33–36)
MCV RBC AUTO: 98.4 FL (ref 80–94)
MONOCYTES # BLD AUTO: 0.6 X10(3)/MCL (ref 0.1–1.3)
MONOCYTES NFR BLD AUTO: 12.6 %
NEUTROPHILS # BLD AUTO: 3.34 X10(3)/MCL (ref 2.1–9.2)
NEUTROPHILS NFR BLD AUTO: 70 %
PLATELET # BLD AUTO: 185 X10(3)/MCL (ref 130–400)
PMV BLD AUTO: 9.2 FL (ref 7.4–10.4)
POTASSIUM SERPL-SCNC: 3.8 MMOL/L (ref 3.5–5.1)
PROT SERPL-MCNC: 6.8 GM/DL (ref 5.8–7.6)
RBC # BLD AUTO: 3.76 X10(6)/MCL (ref 4.7–6.1)
SODIUM SERPL-SCNC: 138 MMOL/L (ref 136–145)
TSH SERPL-ACNC: 2.1 UIU/ML (ref 0.35–4.94)
WBC # BLD AUTO: 4.77 X10(3)/MCL (ref 4.5–11.5)

## 2024-08-12 PROCEDURE — 3077F SYST BP >= 140 MM HG: CPT | Mod: CPTII,S$GLB,, | Performed by: INTERNAL MEDICINE

## 2024-08-12 PROCEDURE — 4010F ACE/ARB THERAPY RXD/TAKEN: CPT | Mod: CPTII,S$GLB,, | Performed by: INTERNAL MEDICINE

## 2024-08-12 PROCEDURE — 99215 OFFICE O/P EST HI 40 MIN: CPT | Mod: S$GLB,,, | Performed by: INTERNAL MEDICINE

## 2024-08-12 PROCEDURE — 80053 COMPREHEN METABOLIC PANEL: CPT

## 2024-08-12 PROCEDURE — 84443 ASSAY THYROID STIM HORMONE: CPT

## 2024-08-12 PROCEDURE — 3288F FALL RISK ASSESSMENT DOCD: CPT | Mod: CPTII,S$GLB,, | Performed by: INTERNAL MEDICINE

## 2024-08-12 PROCEDURE — 3079F DIAST BP 80-89 MM HG: CPT | Mod: CPTII,S$GLB,, | Performed by: INTERNAL MEDICINE

## 2024-08-12 PROCEDURE — 1126F AMNT PAIN NOTED NONE PRSNT: CPT | Mod: CPTII,S$GLB,, | Performed by: INTERNAL MEDICINE

## 2024-08-12 PROCEDURE — 85025 COMPLETE CBC W/AUTO DIFF WBC: CPT

## 2024-08-12 PROCEDURE — 99999 PR PBB SHADOW E&M-EST. PATIENT-LVL IV: CPT | Mod: PBBFAC,,, | Performed by: INTERNAL MEDICINE

## 2024-08-12 PROCEDURE — 96413 CHEMO IV INFUSION 1 HR: CPT

## 2024-08-12 PROCEDURE — 1159F MED LIST DOCD IN RCRD: CPT | Mod: CPTII,S$GLB,, | Performed by: INTERNAL MEDICINE

## 2024-08-12 PROCEDURE — 36415 COLL VENOUS BLD VENIPUNCTURE: CPT

## 2024-08-12 PROCEDURE — 3008F BODY MASS INDEX DOCD: CPT | Mod: CPTII,S$GLB,, | Performed by: INTERNAL MEDICINE

## 2024-08-12 PROCEDURE — 1101F PT FALLS ASSESS-DOCD LE1/YR: CPT | Mod: CPTII,S$GLB,, | Performed by: INTERNAL MEDICINE

## 2024-08-12 PROCEDURE — 25000003 PHARM REV CODE 250: Performed by: INTERNAL MEDICINE

## 2024-08-12 PROCEDURE — 63600175 PHARM REV CODE 636 W HCPCS: Performed by: INTERNAL MEDICINE

## 2024-08-12 RX ORDER — HEPARIN 100 UNIT/ML
500 SYRINGE INTRAVENOUS
Status: CANCELLED | OUTPATIENT
Start: 2024-08-12

## 2024-08-12 RX ORDER — AMLODIPINE BESYLATE 5 MG/1
TABLET ORAL
COMMUNITY

## 2024-08-12 RX ORDER — EPINEPHRINE 0.3 MG/.3ML
0.3 INJECTION SUBCUTANEOUS ONCE AS NEEDED
Status: CANCELLED | OUTPATIENT
Start: 2024-08-12

## 2024-08-12 RX ORDER — EPINEPHRINE 0.3 MG/.3ML
0.3 INJECTION SUBCUTANEOUS ONCE AS NEEDED
Status: DISCONTINUED | OUTPATIENT
Start: 2024-08-12 | End: 2024-08-12 | Stop reason: HOSPADM

## 2024-08-12 RX ORDER — HYDROCHLOROTHIAZIDE 25 MG/1
TABLET ORAL
COMMUNITY

## 2024-08-12 RX ORDER — HEPARIN 100 UNIT/ML
500 SYRINGE INTRAVENOUS
Status: DISCONTINUED | OUTPATIENT
Start: 2024-08-12 | End: 2024-08-12 | Stop reason: HOSPADM

## 2024-08-12 RX ORDER — DIPHENHYDRAMINE HYDROCHLORIDE 50 MG/ML
50 INJECTION INTRAMUSCULAR; INTRAVENOUS ONCE AS NEEDED
Status: DISCONTINUED | OUTPATIENT
Start: 2024-08-12 | End: 2024-08-12 | Stop reason: HOSPADM

## 2024-08-12 RX ORDER — SODIUM CHLORIDE 0.9 % (FLUSH) 0.9 %
10 SYRINGE (ML) INJECTION
Status: CANCELLED | OUTPATIENT
Start: 2024-08-12

## 2024-08-12 RX ORDER — LOSARTAN POTASSIUM 100 MG/1
TABLET ORAL
COMMUNITY

## 2024-08-12 RX ORDER — SODIUM CHLORIDE 0.9 % (FLUSH) 0.9 %
10 SYRINGE (ML) INJECTION
Status: DISCONTINUED | OUTPATIENT
Start: 2024-08-12 | End: 2024-08-12 | Stop reason: HOSPADM

## 2024-08-12 RX ORDER — GLIMEPIRIDE 2 MG/1
TABLET ORAL
COMMUNITY

## 2024-08-12 RX ORDER — DIPHENHYDRAMINE HYDROCHLORIDE 50 MG/ML
50 INJECTION INTRAMUSCULAR; INTRAVENOUS ONCE AS NEEDED
Status: CANCELLED | OUTPATIENT
Start: 2024-08-12

## 2024-08-12 RX ADMIN — SODIUM CHLORIDE 1500 MG: 9 INJECTION, SOLUTION INTRAVENOUS at 12:08

## 2024-08-12 RX ADMIN — HEPARIN 500 UNITS: 100 SYRINGE at 02:08

## 2024-08-12 NOTE — PROGRESS NOTES
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 Dx: 2/10/2023    Present treatment:  maintenance immunotherapy (Durvalumab)-11/15/2023-present    Treatment/Oncology history:  Carbo/Taxol + RT completed 7/12/2023  maintenance immunotherapy (Durvalumab)-11/15/2023-present      Imaging:  PET-CT 03/10/2023:  Hypermetabolic mass in the right supra hilar region measuring 4.7 x 2.9 cm SUV of 20.8.  The mass abuts the right main pulmonary artery and right main stem bronchus.  Subcentimeter mediastinal lymph node noted.  One of LN slightly hypermetabolic with SUV of 3 and is located laterally.  Patchy hypermetabolism within the liver as suspected, however, there are few tiny focal areas of hypermetabolism out of proportion to the rest of the liver.  At least 1 of this corresponds to a low-density focus seen in the noncontrast exam.  CT 5/12/2023: changes consistent with COPD and emphysema in the lungs bilaterally with multiple bulla bleb seen. mass seen in the right hilum extending into the right upper lobe.  It is somewhat infiltrative.  This was seen on the prior PET-CT as well.  The findings are not significantly changed.  There are multiple associated satellite nodules in the right upper lobe.  Rough dimensions of the hilar component of the mass is 5.4 x 5.1 cm and rough measurements of the dominant lesion in the right upper lobe is 11 cm x 3.6 cm.  The mass extends into the right mainstem and right upper lobe bronchus.  This was seen on the prior examination as well.  There is narrowing of the right upper lobe bronchus there is some narrowing of the right upper lobe pulmonary arteries.  Findings are similar to the prior examination.  Scattered areas of punctate subcentimeter nodularity is seen in the right lower lobe.   These are too small to characterize and similar changes were seen previously.    No pleural effusion is seen.  Thoracic aorta is normal in caliber.  There is some mediastinal lymphadenopathy seen.  Reference lymph node is measured in the precarinal region on image number 39 series 2 at 1.5 x 0.9 cm.  This is similar to the prior examination.  Scattered punctate areas of hypoattenuation are seen throughout the liver.  Similar changes were seen previously.  The lesions are too small to characterize but may represent cysts.  The largest lesion is seen in segment 4.  It does not enhance and measures 2 cm x 1.5 cm.  Findings may represent a cyst.  No adrenal nodules are seen.  CT Head  @ [facility] Mild chronic white matter changes. Old left caudate lobe lesion. 2. No evidence of metastatic disease.  CT C/A/P 9/15/2023:  1. Interval resolution of previously seen endobronchial right hilar mass.  2. Improved aeration of the right upper lobe.  There are residual but improved patchy irregular opacities in the right upper lobe, predominantly peripherally located  3. Interval improvement of mediastinal lymph nodes  4. Unchanged hepatic cyst and too small to characterize hepatic hypodensities  CT Chest 2024:  1. No detrimental change from previous exam.  2. Similar small mediastinal lymph nodes and patchy irregular opacities in the right upper lobe  3. Trace right pleural fluid, similar to previous.   Bilateral diagnostic bilateral mammogram with right breast US 2024: IMPRESSION: BENIGN  1. No mammographic evidence of malignancy in either breast.  No sonographic evidence of malignancy in the subareolar region of either breast.     2. Minimal gynecomastia in the bilateral breasts is benign.  The patient was encouraged to follow up with his primary care provider, as clinical correlation (including correlation with current medications, laboratory values, and physical examination) is recommended to determine an  underlying etiology.    CT C/A/P 4/4/2024: level 4R paratracheal lymph node measures 9 x 9 mm, enlarged compared with 5 x 5 mm previously. The superior mediastinal paraesophageal adenopathy is unchanged. The subcarinal lymphadenopathy is unchanged.   1. Interval enlargement of a level 4R paratracheal lymph nodes suspicious for progressive disease.  The remaining mediastinal lymph nodes are unchanged.  2. Stable appearance of the interlobular septal thickening and bronchiectasis in the right upper lobe favored to represent post treatment effect.  3. Slightly improved trace right pleural effusion.         Pathology:  02/10/2023:  Right upper lobe lung needle biopsy: Scattered highly atypical squamous cells, suspicious for squamous cell carcinoma in a background of abundant acute inflammation and necrosis  Right upper lobe, brushing:  Positive for malignant cells.  Squamous cell carcinoma, moderately differentiated.  Right upper lobe biopsy squamous cell carcinoma, moderately differentiated with focal keratinization.  Right lung washings positive for malignant cells.  Squamous cell carcinoma in a background of abundant necrosis.      CLINICAL HISTORY:       Patient: Deondre Ocampo IV is a 69 y.o. male.  Kindly referred for newly diagnosed squamous cell carcinoma of the lung.    He reports that he was being evaluated for a right side pain went imaging studies showed a mass in the lung.  I do not have the CTs.  But he eventually underwent bronchoscopy that showed squamous cell carcinoma.  PET-CT showed the right suprahilar mass with possible mediastinal lymphadenopathy and questionable liver lesions.  He is here to discuss further recommendations.    He is here with his wife.  Patient has history of 53 pack year smoking.  He reports that his breathing is fine.  He denies any chest pain, short of breath or coughing.  No hemoptysis.  He has an appointment to see the radiation oncologist next week.    Chief Complaint: 4  Week Follow Up      Interval History:    He completed 6 cycle of weekly Carbo/Taxol on 7/12/23 along with XRT.     Patient presents today for follow up and continuation of Imfinzi, C10 due today, accompanied by his wife. He complains of back pain, this is chronic. He is followed by his back doctor. Otherwise, he is doing fair.         Past Medical History:   Diagnosis Date    Diabetes mellitus     High cholesterol     Hypertension     Lung cancer       Past Surgical History:   Procedure Laterality Date    BACK SURGERY      DEBRIDEMENT TENNIS ELBOW Right     PERIPHERALLY INSERTED CENTRAL CATHETER INSERTION Left 5/29/2023    Procedure: Insertion-picc;  Surgeon: Letha Silver MD;  Location: [facility] OR;  Service: Oncology;  Laterality: Left;    PERIPHERALLY INSERTED CENTRAL CATHETER INSERTION Left 7/12/2024    Procedure: Insertion-picc;  Surgeon: Letha Silver MD;  Location: [facility] OR;  Service: Oncology;  Laterality: Left;    right arm       Family History   Problem Relation Name Age of Onset    Coronary artery disease Mother      No Known Problems Father      Leukemia Daughter  42    Coronary artery disease Brother      Bone cancer Brother  35    Diabetes Brother      Heart failure Brother      Diabetes Brother      Leukemia Brother      Breast cancer Neg Hx          neg fam history of breast cancer            Review of patient's allergies indicates:   Allergen Reactions    Ace inhibitors Swelling     Other reaction(s): Angioedema, Respiratory distress    Penicillins Itching and Rash      Current Outpatient Medications on File Prior to Visit   Medication Sig Dispense Refill    amLODIPine (NORVASC) 5 MG tablet 1 tablet Orally Once a day for 30 day(s)      aspirin (ECOTRIN) 81 MG EC tablet Take 81 mg by mouth once daily.      glimepiride (AMARYL) 2 MG tablet 1 tablet with breakfast or the first main meal of the day Orally Once a day for 30 day(s)      hydroCHLOROthiazide (HYDRODIURIL) 25 MG tablet 1 tablet in  the morning Orally Once a day for 30 day(s)      losartan (COZAAR) 100 MG tablet Take 100 mg by mouth once daily.      losartan (COZAAR) 100 MG tablet 1 tablet Orally Once a day for 30 day(s)      metoprolol tartrate (LOPRESSOR) 50 MG tablet Take 50 mg by mouth 2 (two) times daily.      naloxone (NARCAN) 4 mg/actuation Spry SMARTSIG:Both Nares      oxyCODONE-acetaminophen (PERCOCET)  mg per tablet Take 1 tablet by mouth every 6 (six) hours as needed for Pain. for pain. 30 tablet 0    pantoprazole (PROTONIX) 40 MG tablet Take 40 mg by mouth once daily.      pravastatin (PRAVACHOL) 20 MG tablet Take 20 mg by mouth once daily.      dulaglutide (TRULICITY SUBQ) Inject into the skin. (Patient not taking: Reported on 7/15/2024)       No current facility-administered medications on file prior to visit.      Review of Systems   Constitutional:  Positive for fatigue. Negative for activity change, appetite change, chills, diaphoresis, fever and unexpected weight change.   HENT:  Negative for nasal congestion, mouth sores, nosebleeds, sinus pressure/congestion, sore throat and trouble swallowing.    Eyes: Negative.    Respiratory:  Positive for cough (chronic but better). Negative for shortness of breath.    Cardiovascular:  Negative for chest pain and palpitations.   Gastrointestinal:  Negative for abdominal distention, abdominal pain, blood in stool, change in bowel habit, constipation, diarrhea, nausea and vomiting.   Endocrine: Negative.    Genitourinary:  Negative for bladder incontinence, decreased urine volume, difficulty urinating, dysuria, frequency, hematuria and urgency.   Musculoskeletal:  Positive for back pain (same) and neck pain (same). Negative for arthralgias, gait problem, joint swelling, leg pain and myalgias.   Integumentary:  Negative for rash.   Allergic/Immunologic: Negative.    Neurological:  Negative for dizziness, tremors, syncope, weakness, light-headedness, numbness, headaches and memory  "loss.   Hematological:  Negative for adenopathy. Does not bruise/bleed easily.   Psychiatric/Behavioral:  Negative for agitation, confusion, hallucinations, sleep disturbance and suicidal ideas. The patient is not nervous/anxious.           Vitals:    08/12/24 1055   BP: (!) 156/86  Comment: PT STATES HE FORGOT TO TAKE BP MEDS THIS AM   BP Location: Right arm   Patient Position: Sitting   Pulse: 63   Resp: 17   Temp: 97.9 °F (36.6 °C)   TempSrc: Oral   SpO2: 98%   Weight: 100.4 kg (221 lb 4.8 oz)   Height: 6' 0.84" (1.85 m)         Wt Readings from Last 6 Encounters:   08/12/24 100.4 kg (221 lb 4.8 oz)   08/12/24 100.4 kg (221 lb 4.8 oz)   08/06/24 98.1 kg (216 lb 2.9 oz)   07/30/24 98.1 kg (216 lb 3.2 oz)   07/15/24 96.5 kg (212 lb 11.2 oz)   07/15/24 96.5 kg (212 lb 11.2 oz)        Body mass index is 29.33 kg/m².  Body surface area is 2.27 meters squared.  Physical Exam  Vitals and nursing note reviewed.   Constitutional:       General: He is not in acute distress.     Appearance: Normal appearance.   HENT:      Head: Normocephalic and atraumatic.      Mouth/Throat:      Mouth: Mucous membranes are moist.   Eyes:      General: No scleral icterus.     Extraocular Movements: Extraocular movements intact.      Conjunctiva/sclera: Conjunctivae normal.   Neck:      Vascular: No JVD.   Cardiovascular:      Rate and Rhythm: Normal rate and regular rhythm.      Heart sounds: No murmur heard.  Pulmonary:      Effort: Pulmonary effort is normal.      Breath sounds: Decreased breath sounds present. No wheezing or rhonchi.   Chest:   Breasts:     Right: No swelling, bleeding, inverted nipple, mass, nipple discharge or skin change.      Left: Normal.   Abdominal:      General: Bowel sounds are normal. There is no distension.      Palpations: Abdomen is soft.      Tenderness: There is no abdominal tenderness.   Musculoskeletal:         General: No swelling or deformity.      Cervical back: Neck supple.   Lymphadenopathy:      " Head:      Right side of head: No submandibular adenopathy.      Left side of head: No submandibular adenopathy.      Cervical: No cervical adenopathy.      Upper Body:      Right upper body: No supraclavicular or axillary adenopathy.      Left upper body: No supraclavicular or axillary adenopathy.      Lower Body: No right inguinal adenopathy. No left inguinal adenopathy.   Skin:     General: Skin is warm.      Coloration: Skin is not jaundiced.      Findings: No rash.   Neurological:      Mental Status: He is alert and oriented to person, place, and time.      Cranial Nerves: Cranial nerves 2-12 are intact.      Comments: Dropping on left side of his face. Left eye is more open than his right eye.    Psychiatric:         Attention and Perception: Attention normal.         Behavior: Behavior is cooperative.       ECOG SCORE    1 - Restricted in strenuous activity-ambulatory and able to carry out work of a light nature         Laboratory:  CBC with Differential:  Lab Results   Component Value Date    WBC 4.77 08/12/2024    RBC 3.76 (L) 08/12/2024    HGB 12.5 (L) 08/12/2024    HCT 37.0 (L) 08/12/2024    MCV 98.4 (H) 08/12/2024    MCH 33.2 (H) 08/12/2024    MCHC 33.8 08/12/2024    RDW 14.3 08/12/2024     08/12/2024    MPV 9.2 08/12/2024        CMP:  Sodium   Date Value Ref Range Status   08/12/2024 138 136 - 145 mmol/L Final     Potassium   Date Value Ref Range Status   08/12/2024 3.8 3.5 - 5.1 mmol/L Final     Chloride   Date Value Ref Range Status   08/12/2024 104 98 - 107 mmol/L Final     CO2   Date Value Ref Range Status   08/12/2024 26 23 - 31 mmol/L Final     Blood Urea Nitrogen   Date Value Ref Range Status   08/12/2024 10.0 8.4 - 25.7 mg/dL Final     Creatinine   Date Value Ref Range Status   08/12/2024 1.02 0.73 - 1.18 mg/dL Final     Calcium   Date Value Ref Range Status   08/12/2024 9.3 8.8 - 10.0 mg/dL Final     Albumin   Date Value Ref Range Status   08/12/2024 3.5 3.4 - 4.8 g/dL Final      Bilirubin Total   Date Value Ref Range Status   08/12/2024 0.6 <=1.5 mg/dL Final     ALP   Date Value Ref Range Status   08/12/2024 77 40 - 150 unit/L Final     AST   Date Value Ref Range Status   08/12/2024 7 5 - 34 unit/L Final     ALT   Date Value Ref Range Status   08/12/2024 <5 0 - 55 unit/L Final         Assessment:       1. Malignant neoplasm of upper lobe of right lung    2. Maintenance antineoplastic immunotherapy    3. Drug-induced hypothyroidism    4. 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   -Restaging CT with GAURAV  -Surgical reevaluation -- no surgery recommended as restaging scan with GAURAV  -maintenance immunotherapy (Durvalumab)-11/15/2023-present  -Restaging CT C/A/P 4/4/2024: level 4R paratracheal lymph node measures 9 x 9 mm, enlarged compared with 5 x 5 mm previously. The superior mediastinal paraesophageal adenopathy is unchanged. The subcarinal lymphadenopathy is unchanged. The remaining mediastinal lymph nodes are unchanged. Slightly improved trace right pleural effusion.    2) Liver hypodensities--> suggesting cysts      Plan:       Patient with progression of level 4R paratracheal lymph node.  This could be inflammatory versus progression of his disease.  At this time is still small and we will continue present treatment.  We will continue to monitor closely.  If continue to grow may benefit of surgical evaluation.    Proceed with C10 immunotherapy (Durvalumab) today.   Continue follow ups with Dr. Hoff  CT C/A/P every 3 months - Due end of 9/2024 - will order next visit  RTC in 4 weeks with NP for TD/labs/infusion same day - he lives in [place] - pt requests appointments @ 11 am or 1 pm  PICC line dressing changes qweek.   Labs: CBC, CMP, TSH - standing order placed - OKAY TO DRAW LABS FROM PICC  LINE    Encouraged to call with questions or problems  The patient was given ample opportunity to ask questions and they were all answered to satisfaction; patient demonstrated understanding of what we discussed and is agreeable to the plan.    Letha Silver MD  Hematology/Oncology      Professional Services   I, Jenelle Waters LPN, acted solely as a scribe for and in the presence of Dr. Letha Silver, who performed these services.

## 2024-08-19 ENCOUNTER — INFUSION (OUTPATIENT)
Dept: INFUSION THERAPY | Facility: HOSPITAL | Age: 69
End: 2024-08-19
Attending: INTERNAL MEDICINE
Payer: MEDICARE

## 2024-08-19 VITALS
HEIGHT: 72 IN | SYSTOLIC BLOOD PRESSURE: 152 MMHG | WEIGHT: 217.69 LBS | OXYGEN SATURATION: 98 % | BODY MASS INDEX: 29.48 KG/M2 | HEART RATE: 60 BPM | TEMPERATURE: 98 F | DIASTOLIC BLOOD PRESSURE: 90 MMHG | RESPIRATION RATE: 18 BRPM

## 2024-08-19 DIAGNOSIS — C34.11 MALIGNANT NEOPLASM OF UPPER LOBE OF RIGHT LUNG: ICD-10-CM

## 2024-08-19 DIAGNOSIS — C34.01 LUNG CANCER, MAIN BRONCHUS, RIGHT: Primary | ICD-10-CM

## 2024-08-19 DIAGNOSIS — Z51.12 MAINTENANCE ANTINEOPLASTIC IMMUNOTHERAPY: ICD-10-CM

## 2024-08-19 DIAGNOSIS — C34.91 SQUAMOUS CELL CARCINOMA OF RIGHT LUNG: ICD-10-CM

## 2024-08-19 PROCEDURE — 25000003 PHARM REV CODE 250

## 2024-08-19 PROCEDURE — A4216 STERILE WATER/SALINE, 10 ML: HCPCS

## 2024-08-19 PROCEDURE — 63600175 PHARM REV CODE 636 W HCPCS

## 2024-08-19 PROCEDURE — 96523 IRRIG DRUG DELIVERY DEVICE: CPT

## 2024-08-19 RX ORDER — HEPARIN 100 UNIT/ML
500 SYRINGE INTRAVENOUS
OUTPATIENT
Start: 2024-08-19

## 2024-08-19 RX ORDER — SODIUM CHLORIDE 0.9 % (FLUSH) 0.9 %
10 SYRINGE (ML) INJECTION
Status: COMPLETED | OUTPATIENT
Start: 2024-08-19 | End: 2024-08-19

## 2024-08-19 RX ORDER — HEPARIN 100 UNIT/ML
500 SYRINGE INTRAVENOUS
Status: DISCONTINUED | OUTPATIENT
Start: 2024-08-19 | End: 2024-08-19 | Stop reason: HOSPADM

## 2024-08-19 RX ORDER — SODIUM CHLORIDE 0.9 % (FLUSH) 0.9 %
10 SYRINGE (ML) INJECTION
OUTPATIENT
Start: 2024-08-19

## 2024-08-19 RX ORDER — HEPARIN 100 UNIT/ML
500 SYRINGE INTRAVENOUS
Status: COMPLETED | OUTPATIENT
Start: 2024-08-19 | End: 2024-08-19

## 2024-08-19 RX ORDER — SODIUM CHLORIDE 0.9 % (FLUSH) 0.9 %
10 SYRINGE (ML) INJECTION
Status: DISCONTINUED | OUTPATIENT
Start: 2024-08-19 | End: 2024-08-19 | Stop reason: HOSPADM

## 2024-08-19 RX ADMIN — HEPARIN 500 UNITS: 100 SYRINGE at 01:08

## 2024-08-19 RX ADMIN — Medication 10 ML: at 01:08

## 2024-08-19 NOTE — PLAN OF CARE
PICC care (QW) completed; tolerated well; next appointments discussed with patient; discharged home in stable condition

## 2024-08-26 ENCOUNTER — INFUSION (OUTPATIENT)
Dept: INFUSION THERAPY | Facility: HOSPITAL | Age: 69
End: 2024-08-26
Attending: INTERNAL MEDICINE
Payer: MEDICARE

## 2024-08-26 VITALS — DIASTOLIC BLOOD PRESSURE: 80 MMHG | SYSTOLIC BLOOD PRESSURE: 145 MMHG | TEMPERATURE: 98 F | HEART RATE: 71 BPM

## 2024-08-26 DIAGNOSIS — C34.11 MALIGNANT NEOPLASM OF UPPER LOBE OF RIGHT LUNG: ICD-10-CM

## 2024-08-26 DIAGNOSIS — C34.91 SQUAMOUS CELL CARCINOMA OF RIGHT LUNG: ICD-10-CM

## 2024-08-26 DIAGNOSIS — Z51.12 MAINTENANCE ANTINEOPLASTIC IMMUNOTHERAPY: ICD-10-CM

## 2024-08-26 DIAGNOSIS — C34.01 LUNG CANCER, MAIN BRONCHUS, RIGHT: Primary | ICD-10-CM

## 2024-08-26 PROCEDURE — 96523 IRRIG DRUG DELIVERY DEVICE: CPT

## 2024-08-26 PROCEDURE — 63600175 PHARM REV CODE 636 W HCPCS

## 2024-08-26 RX ORDER — SODIUM CHLORIDE 0.9 % (FLUSH) 0.9 %
10 SYRINGE (ML) INJECTION
Status: DISCONTINUED | OUTPATIENT
Start: 2024-08-26 | End: 2024-08-26 | Stop reason: HOSPADM

## 2024-08-26 RX ORDER — SODIUM CHLORIDE 0.9 % (FLUSH) 0.9 %
10 SYRINGE (ML) INJECTION
OUTPATIENT
Start: 2024-08-26

## 2024-08-26 RX ORDER — HEPARIN 100 UNIT/ML
500 SYRINGE INTRAVENOUS
Status: DISCONTINUED | OUTPATIENT
Start: 2024-08-26 | End: 2024-08-26 | Stop reason: HOSPADM

## 2024-08-26 RX ORDER — HEPARIN 100 UNIT/ML
500 SYRINGE INTRAVENOUS
OUTPATIENT
Start: 2024-08-26

## 2024-08-26 RX ORDER — HEPARIN 100 UNIT/ML
500 SYRINGE INTRAVENOUS
Status: COMPLETED | OUTPATIENT
Start: 2024-08-26 | End: 2024-08-26

## 2024-08-26 RX ADMIN — HEPARIN 500 UNITS: 100 SYRINGE at 10:08

## 2024-09-09 ENCOUNTER — INFUSION (OUTPATIENT)
Dept: INFUSION THERAPY | Facility: HOSPITAL | Age: 69
End: 2024-09-09
Attending: INTERNAL MEDICINE
Payer: MEDICARE

## 2024-09-09 ENCOUNTER — OFFICE VISIT (OUTPATIENT)
Dept: HEMATOLOGY/ONCOLOGY | Facility: CLINIC | Age: 69
End: 2024-09-09
Payer: MEDICARE

## 2024-09-09 VITALS
WEIGHT: 220.88 LBS | OXYGEN SATURATION: 97 % | HEIGHT: 72 IN | TEMPERATURE: 99 F | BODY MASS INDEX: 29.92 KG/M2 | DIASTOLIC BLOOD PRESSURE: 84 MMHG | RESPIRATION RATE: 18 BRPM | HEART RATE: 57 BPM | SYSTOLIC BLOOD PRESSURE: 143 MMHG

## 2024-09-09 DIAGNOSIS — C34.91 SQUAMOUS CELL CARCINOMA OF RIGHT LUNG: ICD-10-CM

## 2024-09-09 DIAGNOSIS — E03.2 DRUG-INDUCED HYPOTHYROIDISM: ICD-10-CM

## 2024-09-09 DIAGNOSIS — R53.83 FATIGUE, UNSPECIFIED TYPE: ICD-10-CM

## 2024-09-09 DIAGNOSIS — Z51.12 MAINTENANCE ANTINEOPLASTIC IMMUNOTHERAPY: ICD-10-CM

## 2024-09-09 DIAGNOSIS — C34.11 MALIGNANT NEOPLASM OF UPPER LOBE OF RIGHT LUNG: ICD-10-CM

## 2024-09-09 DIAGNOSIS — C34.01 LUNG CANCER, MAIN BRONCHUS, RIGHT: ICD-10-CM

## 2024-09-09 DIAGNOSIS — C34.91 SQUAMOUS CELL CARCINOMA OF RIGHT LUNG: Primary | ICD-10-CM

## 2024-09-09 LAB
ALBUMIN SERPL-MCNC: 3.4 G/DL (ref 3.4–4.8)
ALBUMIN/GLOB SERPL: 1 RATIO (ref 1.1–2)
ALP SERPL-CCNC: 73 UNIT/L (ref 40–150)
ALT SERPL-CCNC: 6 UNIT/L (ref 0–55)
ANION GAP SERPL CALC-SCNC: 5 MEQ/L
AST SERPL-CCNC: 7 UNIT/L (ref 5–34)
BASOPHILS # BLD AUTO: 0.01 X10(3)/MCL
BASOPHILS NFR BLD AUTO: 0.2 %
BILIRUB SERPL-MCNC: 0.7 MG/DL
BUN SERPL-MCNC: 11.1 MG/DL (ref 8.4–25.7)
CALCIUM SERPL-MCNC: 8.8 MG/DL (ref 8.8–10)
CHLORIDE SERPL-SCNC: 106 MMOL/L (ref 98–107)
CO2 SERPL-SCNC: 28 MMOL/L (ref 23–31)
CREAT SERPL-MCNC: 0.99 MG/DL (ref 0.73–1.18)
CREAT/UREA NIT SERPL: 11
EOSINOPHIL # BLD AUTO: 0.04 X10(3)/MCL (ref 0–0.9)
EOSINOPHIL NFR BLD AUTO: 0.8 %
ERYTHROCYTE [DISTWIDTH] IN BLOOD BY AUTOMATED COUNT: 13.3 % (ref 11.5–17)
GFR SERPLBLD CREATININE-BSD FMLA CKD-EPI: >60 ML/MIN/1.73/M2
GLOBULIN SER-MCNC: 3.3 GM/DL (ref 2.4–3.5)
GLUCOSE SERPL-MCNC: 129 MG/DL (ref 82–115)
HCT VFR BLD AUTO: 38 % (ref 42–52)
HGB BLD-MCNC: 12.7 G/DL (ref 14–18)
IMM GRANULOCYTES # BLD AUTO: 0.02 X10(3)/MCL (ref 0–0.04)
IMM GRANULOCYTES NFR BLD AUTO: 0.4 %
LYMPHOCYTES # BLD AUTO: 1.02 X10(3)/MCL (ref 0.6–4.6)
LYMPHOCYTES NFR BLD AUTO: 20.9 %
MCH RBC QN AUTO: 33.3 PG (ref 27–31)
MCHC RBC AUTO-ENTMCNC: 33.4 G/DL (ref 33–36)
MCV RBC AUTO: 99.7 FL (ref 80–94)
MONOCYTES # BLD AUTO: 0.67 X10(3)/MCL (ref 0.1–1.3)
MONOCYTES NFR BLD AUTO: 13.7 %
NEUTROPHILS # BLD AUTO: 3.13 X10(3)/MCL (ref 2.1–9.2)
NEUTROPHILS NFR BLD AUTO: 64 %
PLATELET # BLD AUTO: 201 X10(3)/MCL (ref 130–400)
PMV BLD AUTO: 9.1 FL (ref 7.4–10.4)
POTASSIUM SERPL-SCNC: 3.3 MMOL/L (ref 3.5–5.1)
PROT SERPL-MCNC: 6.7 GM/DL (ref 5.8–7.6)
RBC # BLD AUTO: 3.81 X10(6)/MCL (ref 4.7–6.1)
SODIUM SERPL-SCNC: 139 MMOL/L (ref 136–145)
TSH SERPL-ACNC: 2.25 UIU/ML (ref 0.35–4.94)
WBC # BLD AUTO: 4.89 X10(3)/MCL (ref 4.5–11.5)

## 2024-09-09 PROCEDURE — 3288F FALL RISK ASSESSMENT DOCD: CPT | Mod: CPTII,S$GLB,, | Performed by: NURSE PRACTITIONER

## 2024-09-09 PROCEDURE — 1101F PT FALLS ASSESS-DOCD LE1/YR: CPT | Mod: CPTII,S$GLB,, | Performed by: NURSE PRACTITIONER

## 2024-09-09 PROCEDURE — 1160F RVW MEDS BY RX/DR IN RCRD: CPT | Mod: CPTII,S$GLB,, | Performed by: NURSE PRACTITIONER

## 2024-09-09 PROCEDURE — 3008F BODY MASS INDEX DOCD: CPT | Mod: CPTII,S$GLB,, | Performed by: NURSE PRACTITIONER

## 2024-09-09 PROCEDURE — 3077F SYST BP >= 140 MM HG: CPT | Mod: CPTII,S$GLB,, | Performed by: NURSE PRACTITIONER

## 2024-09-09 PROCEDURE — 84443 ASSAY THYROID STIM HORMONE: CPT | Performed by: NURSE PRACTITIONER

## 2024-09-09 PROCEDURE — 1125F AMNT PAIN NOTED PAIN PRSNT: CPT | Mod: CPTII,S$GLB,, | Performed by: NURSE PRACTITIONER

## 2024-09-09 PROCEDURE — 99215 OFFICE O/P EST HI 40 MIN: CPT | Mod: S$GLB,,, | Performed by: NURSE PRACTITIONER

## 2024-09-09 PROCEDURE — 4010F ACE/ARB THERAPY RXD/TAKEN: CPT | Mod: CPTII,S$GLB,, | Performed by: NURSE PRACTITIONER

## 2024-09-09 PROCEDURE — 36415 COLL VENOUS BLD VENIPUNCTURE: CPT | Performed by: NURSE PRACTITIONER

## 2024-09-09 PROCEDURE — 25000003 PHARM REV CODE 250: Performed by: NURSE PRACTITIONER

## 2024-09-09 PROCEDURE — 96413 CHEMO IV INFUSION 1 HR: CPT

## 2024-09-09 PROCEDURE — 99999 PR PBB SHADOW E&M-EST. PATIENT-LVL IV: CPT | Mod: PBBFAC,,, | Performed by: NURSE PRACTITIONER

## 2024-09-09 PROCEDURE — 1159F MED LIST DOCD IN RCRD: CPT | Mod: CPTII,S$GLB,, | Performed by: NURSE PRACTITIONER

## 2024-09-09 PROCEDURE — 85025 COMPLETE CBC W/AUTO DIFF WBC: CPT | Performed by: NURSE PRACTITIONER

## 2024-09-09 PROCEDURE — 63600175 PHARM REV CODE 636 W HCPCS: Mod: JZ,JG | Performed by: NURSE PRACTITIONER

## 2024-09-09 PROCEDURE — 80053 COMPREHEN METABOLIC PANEL: CPT | Performed by: NURSE PRACTITIONER

## 2024-09-09 PROCEDURE — 3079F DIAST BP 80-89 MM HG: CPT | Mod: CPTII,S$GLB,, | Performed by: NURSE PRACTITIONER

## 2024-09-09 RX ORDER — EPINEPHRINE 0.3 MG/.3ML
0.3 INJECTION SUBCUTANEOUS ONCE AS NEEDED
Status: CANCELLED | OUTPATIENT
Start: 2024-09-09

## 2024-09-09 RX ORDER — SODIUM CHLORIDE 0.9 % (FLUSH) 0.9 %
10 SYRINGE (ML) INJECTION
Status: DISCONTINUED | OUTPATIENT
Start: 2024-09-09 | End: 2024-09-09 | Stop reason: HOSPADM

## 2024-09-09 RX ORDER — DIPHENHYDRAMINE HYDROCHLORIDE 50 MG/ML
50 INJECTION INTRAMUSCULAR; INTRAVENOUS ONCE AS NEEDED
Status: CANCELLED | OUTPATIENT
Start: 2024-09-09

## 2024-09-09 RX ORDER — HEPARIN 100 UNIT/ML
500 SYRINGE INTRAVENOUS
Status: DISCONTINUED | OUTPATIENT
Start: 2024-09-09 | End: 2024-09-09 | Stop reason: HOSPADM

## 2024-09-09 RX ORDER — DIPHENHYDRAMINE HYDROCHLORIDE 50 MG/ML
50 INJECTION INTRAMUSCULAR; INTRAVENOUS ONCE AS NEEDED
Status: DISCONTINUED | OUTPATIENT
Start: 2024-09-09 | End: 2024-09-09 | Stop reason: HOSPADM

## 2024-09-09 RX ORDER — EPINEPHRINE 0.3 MG/.3ML
0.3 INJECTION SUBCUTANEOUS ONCE AS NEEDED
Status: DISCONTINUED | OUTPATIENT
Start: 2024-09-09 | End: 2024-09-09 | Stop reason: HOSPADM

## 2024-09-09 RX ORDER — HEPARIN 100 UNIT/ML
500 SYRINGE INTRAVENOUS
Status: CANCELLED | OUTPATIENT
Start: 2024-09-09

## 2024-09-09 RX ORDER — SODIUM CHLORIDE 0.9 % (FLUSH) 0.9 %
10 SYRINGE (ML) INJECTION
Status: CANCELLED | OUTPATIENT
Start: 2024-09-09

## 2024-09-09 RX ADMIN — SODIUM CHLORIDE: 9 INJECTION, SOLUTION INTRAVENOUS at 02:09

## 2024-09-09 RX ADMIN — SODIUM CHLORIDE 1500 MG: 9 INJECTION, SOLUTION INTRAVENOUS at 02:09

## 2024-09-09 NOTE — PROGRESS NOTES
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 Dx: 2/10/2023    Present treatment:  maintenance immunotherapy (Durvalumab)-11/15/2023-present    Treatment/Oncology history:  Carbo/Taxol + RT completed 7/12/2023  maintenance immunotherapy (Durvalumab)-11/15/2023-present      Imaging:  PET-CT 03/10/2023:  Hypermetabolic mass in the right supra hilar region measuring 4.7 x 2.9 cm SUV of 20.8.  The mass abuts the right main pulmonary artery and right main stem bronchus.  Subcentimeter mediastinal lymph node noted.  One of LN slightly hypermetabolic with SUV of 3 and is located laterally.  Patchy hypermetabolism within the liver as suspected, however, there are few tiny focal areas of hypermetabolism out of proportion to the rest of the liver.  At least 1 of this corresponds to a low-density focus seen in the noncontrast exam.  CT 5/12/2023: changes consistent with COPD and emphysema in the lungs bilaterally with multiple bulla bleb seen. mass seen in the right hilum extending into the right upper lobe.  It is somewhat infiltrative.  This was seen on the prior PET-CT as well.  The findings are not significantly changed.  There are multiple associated satellite nodules in the right upper lobe.  Rough dimensions of the hilar component of the mass is 5.4 x 5.1 cm and rough measurements of the dominant lesion in the right upper lobe is 11 cm x 3.6 cm.  The mass extends into the right mainstem and right upper lobe bronchus.  This was seen on the prior examination as well.  There is narrowing of the right upper lobe bronchus there is some narrowing of the right upper lobe pulmonary arteries.  Findings are similar to the prior examination.  Scattered areas of punctate subcentimeter nodularity is seen in the right lower lobe.   These are too small to characterize and similar changes were seen previously.    No pleural effusion is seen.  Thoracic aorta is normal in caliber.  There is some mediastinal lymphadenopathy seen.  Reference lymph node is measured in the precarinal region on image number 39 series 2 at 1.5 x 0.9 cm.  This is similar to the prior examination.  Scattered punctate areas of hypoattenuation are seen throughout the liver.  Similar changes were seen previously.  The lesions are too small to characterize but may represent cysts.  The largest lesion is seen in segment 4.  It does not enhance and measures 2 cm x 1.5 cm.  Findings may represent a cyst.  No adrenal nodules are seen.  CT Head  @ [facility]. Mild chronic white matter changes. Old left caudate lobe lesion. 2. No evidence of metastatic disease.  CT C/A/P 9/15/2023:  1. Interval resolution of previously seen endobronchial right hilar mass.  2. Improved aeration of the right upper lobe.  There are residual but improved patchy irregular opacities in the right upper lobe, predominantly peripherally located  3. Interval improvement of mediastinal lymph nodes  4. Unchanged hepatic cyst and too small to characterize hepatic hypodensities  CT Chest 2024:  1. No detrimental change from previous exam.  2. Similar small mediastinal lymph nodes and patchy irregular opacities in the right upper lobe  3. Trace right pleural fluid, similar to previous.   Bilateral diagnostic bilateral mammogram with right breast US 2024: IMPRESSION: BENIGN  1. No mammographic evidence of malignancy in either breast.  No sonographic evidence of malignancy in the subareolar region of either breast.     2. Minimal gynecomastia in the bilateral breasts is benign.  The patient was encouraged to follow up with his primary care provider, as clinical correlation (including correlation with current medications, laboratory values, and physical examination) is recommended to determine an  underlying etiology.    CT C/A/P 4/4/2024: level 4R paratracheal lymph node measures 9 x 9 mm, enlarged compared with 5 x 5 mm previously. The superior mediastinal paraesophageal adenopathy is unchanged. The subcarinal lymphadenopathy is unchanged.   1. Interval enlargement of a level 4R paratracheal lymph nodes suspicious for progressive disease.  The remaining mediastinal lymph nodes are unchanged.  2. Stable appearance of the interlobular septal thickening and bronchiectasis in the right upper lobe favored to represent post treatment effect.  3. Slightly improved trace right pleural effusion.         Pathology:  02/10/2023:  Right upper lobe lung needle biopsy: Scattered highly atypical squamous cells, suspicious for squamous cell carcinoma in a background of abundant acute inflammation and necrosis  Right upper lobe, brushing:  Positive for malignant cells.  Squamous cell carcinoma, moderately differentiated.  Right upper lobe biopsy squamous cell carcinoma, moderately differentiated with focal keratinization.  Right lung washings positive for malignant cells.  Squamous cell carcinoma in a background of abundant necrosis.      CLINICAL HISTORY:       Patient: Deondre Ocampo IV is a 69 y.o. male.  Kindly referred for newly diagnosed squamous cell carcinoma of the lung.    He reports that he was being evaluated for a right side pain went imaging studies showed a mass in the lung.  I do not have the CTs.  But he eventually underwent bronchoscopy that showed squamous cell carcinoma.  PET-CT showed the right suprahilar mass with possible mediastinal lymphadenopathy and questionable liver lesions.  He is here to discuss further recommendations.    He is here with his wife.  Patient has history of 53 pack year smoking.  He reports that his breathing is fine.  He denies any chest pain, short of breath or coughing.  No hemoptysis.  He has an appointment to see the radiation oncologist next week.    Chief Complaint: 4  Week Follow Up (PT wife states PT LT Foot & ankle is swollen a little. PT states his LT Hip sometimes gets stuck and he can't move without hip.)      Interval History:    He completed 6 cycle of weekly Carbo/Taxol on 7/12/23 along with XRT.     Patient presents today for follow up and continuation of Imfinzi, C11 due today, accompanied by his wife. He complains of joint pain, this is chronic. He is followed by his back doctor. He reports left ankle and foot swelling that has since resolved. He admits to eating foods rich in sodium and not drinking enough water. Otherwise, he is doing fair.         Past Medical History:   Diagnosis Date    Diabetes mellitus     High cholesterol     Hypertension     Lung cancer       Past Surgical History:   Procedure Laterality Date    BACK SURGERY      DEBRIDEMENT TENNIS ELBOW Right     PERIPHERALLY INSERTED CENTRAL CATHETER INSERTION Left 5/29/2023    Procedure: Insertion-picc;  Surgeon: Letha Silver MD;  Location: [facility];  Service: Oncology;  Laterality: Left;    PERIPHERALLY INSERTED CENTRAL CATHETER INSERTION Left 7/12/2024    Procedure: Insertion-picc;  Surgeon: Letha Silver MD;  Location: [facility];  Service: Oncology;  Laterality: Left;    right arm       Family History   Problem Relation Name Age of Onset    Coronary artery disease Mother      No Known Problems Father      Leukemia Daughter  42    Coronary artery disease Brother      Bone cancer Brother  35    Diabetes Brother      Heart failure Brother      Diabetes Brother      Leukemia Brother      Breast cancer Neg Hx          neg fam history of breast cancer            Review of patient's allergies indicates:   Allergen Reactions    Ace inhibitors Swelling     Other reaction(s): Angioedema, Respiratory distress    Penicillins Itching and Rash      Current Outpatient Medications on File Prior to Visit   Medication Sig Dispense Refill    amLODIPine (NORVASC) 5 MG tablet 1 tablet Orally Once a day for 30  day(s)      aspirin (ECOTRIN) 81 MG EC tablet Take 81 mg by mouth once daily.      glimepiride (AMARYL) 2 MG tablet 1 tablet with breakfast or the first main meal of the day Orally Once a day for 30 day(s)      hydroCHLOROthiazide (HYDRODIURIL) 25 MG tablet 1 tablet in the morning Orally Once a day for 30 day(s)      losartan (COZAAR) 100 MG tablet Take 100 mg by mouth once daily.      losartan (COZAAR) 100 MG tablet 1 tablet Orally Once a day for 30 day(s)      metoprolol tartrate (LOPRESSOR) 50 MG tablet Take 50 mg by mouth 2 (two) times daily.      naloxone (NARCAN) 4 mg/actuation Spry SMARTSIG:Both Nares      oxyCODONE-acetaminophen (PERCOCET)  mg per tablet Take 1 tablet by mouth every 6 (six) hours as needed for Pain. for pain. 30 tablet 0    pantoprazole (PROTONIX) 40 MG tablet Take 40 mg by mouth once daily.      pravastatin (PRAVACHOL) 20 MG tablet Take 20 mg by mouth once daily.      dulaglutide (TRULICITY SUBQ) Inject into the skin. (Patient not taking: Reported on 7/15/2024)       No current facility-administered medications on file prior to visit.      Review of Systems   Constitutional:  Positive for fatigue. Negative for activity change, appetite change, chills, diaphoresis, fever and unexpected weight change.   HENT:  Negative for nasal congestion, mouth sores, nosebleeds, sinus pressure/congestion, sore throat and trouble swallowing.    Eyes: Negative.    Respiratory:  Positive for cough (chronic but better). Negative for shortness of breath.    Cardiovascular:  Negative for chest pain and palpitations.   Gastrointestinal:  Negative for abdominal distention, abdominal pain, blood in stool, change in bowel habit, constipation, diarrhea, nausea and vomiting.   Endocrine: Negative.    Genitourinary:  Negative for bladder incontinence, decreased urine volume, difficulty urinating, dysuria, frequency, hematuria and urgency.   Musculoskeletal:  Positive for back pain (same) and neck pain (same).  Negative for arthralgias, gait problem, joint swelling, leg pain and myalgias.   Integumentary:  Negative for rash.   Allergic/Immunologic: Negative.    Neurological:  Negative for dizziness, tremors, syncope, weakness, light-headedness, numbness, headaches and memory loss.   Hematological:  Negative for adenopathy. Does not bruise/bleed easily.   Psychiatric/Behavioral:  Negative for agitation, confusion, hallucinations, sleep disturbance and suicidal ideas. The patient is not nervous/anxious.           Vitals:    09/09/24 1319   BP: (!) 143/84   BP Location: Right arm   Patient Position: Sitting   Pulse: (!) 57   Resp: 18   Temp: 98.8 °F (37.1 °C)   TempSrc: Oral   SpO2: 97%   Weight: 100.2 kg (220 lb 14.4 oz)   Height: 6' (1.829 m)           Wt Readings from Last 6 Encounters:   09/09/24 100.2 kg (220 lb 14.4 oz)   08/19/24 98.7 kg (217 lb 11.2 oz)   08/12/24 100.4 kg (221 lb 4.8 oz)   08/12/24 100.4 kg (221 lb 4.8 oz)   08/06/24 98.1 kg (216 lb 2.9 oz)   07/30/24 98.1 kg (216 lb 3.2 oz)        Body mass index is 29.96 kg/m².  Body surface area is 2.26 meters squared.  Physical Exam  Vitals and nursing note reviewed.   Constitutional:       General: He is not in acute distress.     Appearance: Normal appearance.   HENT:      Head: Normocephalic and atraumatic.      Mouth/Throat:      Mouth: Mucous membranes are moist.   Eyes:      General: No scleral icterus.     Extraocular Movements: Extraocular movements intact.      Conjunctiva/sclera: Conjunctivae normal.   Neck:      Vascular: No JVD.   Cardiovascular:      Rate and Rhythm: Normal rate and regular rhythm.      Heart sounds: No murmur heard.  Pulmonary:      Effort: Pulmonary effort is normal.      Breath sounds: Decreased breath sounds present. No wheezing or rhonchi.   Chest:   Breasts:     Right: No swelling, bleeding, inverted nipple, mass, nipple discharge or skin change.      Left: Normal.   Abdominal:      General: Bowel sounds are normal. There is no  distension.      Palpations: Abdomen is soft.      Tenderness: There is no abdominal tenderness.   Musculoskeletal:         General: No swelling or deformity.      Cervical back: Neck supple.   Lymphadenopathy:      Head:      Right side of head: No submandibular adenopathy.      Left side of head: No submandibular adenopathy.      Cervical: No cervical adenopathy.      Upper Body:      Right upper body: No supraclavicular or axillary adenopathy.      Left upper body: No supraclavicular or axillary adenopathy.      Lower Body: No right inguinal adenopathy. No left inguinal adenopathy.   Skin:     General: Skin is warm.      Coloration: Skin is not jaundiced.      Findings: No rash.   Neurological:      Mental Status: He is alert and oriented to person, place, and time.      Cranial Nerves: Cranial nerves 2-12 are intact.      Comments: Dropping on left side of his face. Left eye is more open than his right eye.    Psychiatric:         Attention and Perception: Attention normal.         Behavior: Behavior is cooperative.       ECOG SCORE             Laboratory:  CBC with Differential:  Lab Results   Component Value Date    WBC 4.89 09/09/2024    RBC 3.81 (L) 09/09/2024    HGB 12.7 (L) 09/09/2024    HCT 38.0 (L) 09/09/2024    MCV 99.7 (H) 09/09/2024    MCH 33.3 (H) 09/09/2024    MCHC 33.4 09/09/2024    RDW 13.3 09/09/2024     09/09/2024    MPV 9.1 09/09/2024        CMP:  Sodium   Date Value Ref Range Status   08/12/2024 138 136 - 145 mmol/L Final     Potassium   Date Value Ref Range Status   08/12/2024 3.8 3.5 - 5.1 mmol/L Final     Chloride   Date Value Ref Range Status   08/12/2024 104 98 - 107 mmol/L Final     CO2   Date Value Ref Range Status   08/12/2024 26 23 - 31 mmol/L Final     Blood Urea Nitrogen   Date Value Ref Range Status   08/12/2024 10.0 8.4 - 25.7 mg/dL Final     Creatinine   Date Value Ref Range Status   08/12/2024 1.02 0.73 - 1.18 mg/dL Final     Calcium   Date Value Ref Range Status    08/12/2024 9.3 8.8 - 10.0 mg/dL Final     Albumin   Date Value Ref Range Status   08/12/2024 3.5 3.4 - 4.8 g/dL Final     Bilirubin Total   Date Value Ref Range Status   08/12/2024 0.6 <=1.5 mg/dL Final     ALP   Date Value Ref Range Status   08/12/2024 77 40 - 150 unit/L Final     AST   Date Value Ref Range Status   08/12/2024 7 5 - 34 unit/L Final     ALT   Date Value Ref Range Status   08/12/2024 <5 0 - 55 unit/L Final         Assessment:       1. Malignant neoplasm of upper lobe of right lung    2. Maintenance antineoplastic immunotherapy    3. 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   -Restaging CT with GAURAV  -Surgical reevaluation -- no surgery recommended as restaging scan with GAURAV  -maintenance immunotherapy (Durvalumab)-11/15/2023-present  -Restaging CT C/A/P 4/4/2024: level 4R paratracheal lymph node measures 9 x 9 mm, enlarged compared with 5 x 5 mm previously. The superior mediastinal paraesophageal adenopathy is unchanged. The subcarinal lymphadenopathy is unchanged. The remaining mediastinal lymph nodes are unchanged. Slightly improved trace right pleural effusion.    2) Liver hypodensities--> suggesting cysts      Plan:       Patient with progression of level 4R paratracheal lymph node.  This could be inflammatory versus progression of his disease.  At this time is still small and we will continue present treatment.  We will continue to monitor closely.  If continue to grow may benefit of surgical evaluation.    Proceed with C11 immunotherapy (Durvalumab) today.   Continue follow ups with Dr. Hoff  CT C/A/P every 3 months - Due end of 9/2024 - ordered to be done prior to appointment.   RTC in 4 weeks with MD for TD/labs/infusion same day - he lives in [place] - pt requests appointments @ 11 am or 1 pm  PICC line  dressing changes qweek.   Labs: CBC, CMP, TSH - standing order placed - OKAY TO DRAW LABS FROM PICC LINE  (One more cycles of Imfinzi left after today)  JYOTHI Gay

## 2024-09-16 ENCOUNTER — INFUSION (OUTPATIENT)
Dept: INFUSION THERAPY | Facility: HOSPITAL | Age: 69
End: 2024-09-16
Attending: INTERNAL MEDICINE
Payer: MEDICARE

## 2024-09-16 DIAGNOSIS — C34.01 LUNG CANCER, MAIN BRONCHUS, RIGHT: Primary | ICD-10-CM

## 2024-09-16 DIAGNOSIS — C34.91 SQUAMOUS CELL CARCINOMA OF RIGHT LUNG: ICD-10-CM

## 2024-09-16 DIAGNOSIS — Z51.12 MAINTENANCE ANTINEOPLASTIC IMMUNOTHERAPY: ICD-10-CM

## 2024-09-16 DIAGNOSIS — C34.11 MALIGNANT NEOPLASM OF UPPER LOBE OF RIGHT LUNG: ICD-10-CM

## 2024-09-16 PROCEDURE — 25000003 PHARM REV CODE 250

## 2024-09-16 PROCEDURE — 96523 IRRIG DRUG DELIVERY DEVICE: CPT

## 2024-09-16 PROCEDURE — 63600175 PHARM REV CODE 636 W HCPCS

## 2024-09-16 PROCEDURE — A4216 STERILE WATER/SALINE, 10 ML: HCPCS

## 2024-09-16 RX ORDER — HEPARIN 100 UNIT/ML
500 SYRINGE INTRAVENOUS
Status: COMPLETED | OUTPATIENT
Start: 2024-09-16 | End: 2024-09-16

## 2024-09-16 RX ORDER — SODIUM CHLORIDE 0.9 % (FLUSH) 0.9 %
10 SYRINGE (ML) INJECTION
Status: DISCONTINUED | OUTPATIENT
Start: 2024-09-16 | End: 2024-09-16 | Stop reason: HOSPADM

## 2024-09-16 RX ORDER — SODIUM CHLORIDE 0.9 % (FLUSH) 0.9 %
10 SYRINGE (ML) INJECTION
Status: COMPLETED | OUTPATIENT
Start: 2024-09-16 | End: 2024-09-16

## 2024-09-16 RX ORDER — SODIUM CHLORIDE 0.9 % (FLUSH) 0.9 %
10 SYRINGE (ML) INJECTION
OUTPATIENT
Start: 2024-09-16

## 2024-09-16 RX ORDER — HEPARIN 100 UNIT/ML
500 SYRINGE INTRAVENOUS
OUTPATIENT
Start: 2024-09-16

## 2024-09-16 RX ORDER — HEPARIN 100 UNIT/ML
500 SYRINGE INTRAVENOUS
Status: DISCONTINUED | OUTPATIENT
Start: 2024-09-16 | End: 2024-09-16 | Stop reason: HOSPADM

## 2024-09-16 RX ADMIN — HEPARIN 500 UNITS: 100 SYRINGE at 11:09

## 2024-09-16 RX ADMIN — SODIUM CHLORIDE, PRESERVATIVE FREE 10 ML: 5 INJECTION INTRAVENOUS at 12:09

## 2024-09-23 ENCOUNTER — INFUSION (OUTPATIENT)
Dept: INFUSION THERAPY | Facility: HOSPITAL | Age: 69
End: 2024-09-23
Attending: INTERNAL MEDICINE
Payer: MEDICARE

## 2024-09-23 VITALS
DIASTOLIC BLOOD PRESSURE: 86 MMHG | BODY MASS INDEX: 30.75 KG/M2 | HEIGHT: 72 IN | RESPIRATION RATE: 18 BRPM | TEMPERATURE: 98 F | WEIGHT: 227 LBS | OXYGEN SATURATION: 98 % | HEART RATE: 76 BPM | SYSTOLIC BLOOD PRESSURE: 138 MMHG

## 2024-09-23 DIAGNOSIS — C34.11 MALIGNANT NEOPLASM OF UPPER LOBE OF RIGHT LUNG: ICD-10-CM

## 2024-09-23 DIAGNOSIS — C34.91 SQUAMOUS CELL CARCINOMA OF RIGHT LUNG: ICD-10-CM

## 2024-09-23 DIAGNOSIS — C34.01 LUNG CANCER, MAIN BRONCHUS, RIGHT: Primary | ICD-10-CM

## 2024-09-23 DIAGNOSIS — Z51.12 MAINTENANCE ANTINEOPLASTIC IMMUNOTHERAPY: ICD-10-CM

## 2024-09-23 PROCEDURE — 25000003 PHARM REV CODE 250

## 2024-09-23 PROCEDURE — A4216 STERILE WATER/SALINE, 10 ML: HCPCS

## 2024-09-23 PROCEDURE — 96523 IRRIG DRUG DELIVERY DEVICE: CPT

## 2024-09-23 PROCEDURE — 63600175 PHARM REV CODE 636 W HCPCS

## 2024-09-23 RX ORDER — SODIUM CHLORIDE 0.9 % (FLUSH) 0.9 %
10 SYRINGE (ML) INJECTION
Status: COMPLETED | OUTPATIENT
Start: 2024-09-23 | End: 2024-09-23

## 2024-09-23 RX ORDER — HEPARIN 100 UNIT/ML
500 SYRINGE INTRAVENOUS
OUTPATIENT
Start: 2024-09-23

## 2024-09-23 RX ORDER — SODIUM CHLORIDE 0.9 % (FLUSH) 0.9 %
10 SYRINGE (ML) INJECTION
OUTPATIENT
Start: 2024-09-23

## 2024-09-23 RX ORDER — SODIUM CHLORIDE 0.9 % (FLUSH) 0.9 %
10 SYRINGE (ML) INJECTION
Status: DISCONTINUED | OUTPATIENT
Start: 2024-09-23 | End: 2024-09-23 | Stop reason: HOSPADM

## 2024-09-23 RX ORDER — HEPARIN 100 UNIT/ML
500 SYRINGE INTRAVENOUS
Status: COMPLETED | OUTPATIENT
Start: 2024-09-23 | End: 2024-09-23

## 2024-09-23 RX ORDER — HEPARIN 100 UNIT/ML
500 SYRINGE INTRAVENOUS
Status: DISCONTINUED | OUTPATIENT
Start: 2024-09-23 | End: 2024-09-23 | Stop reason: HOSPADM

## 2024-09-23 RX ADMIN — HEPARIN 500 UNITS: 100 SYRINGE at 01:09

## 2024-09-23 RX ADMIN — SODIUM CHLORIDE, PRESERVATIVE FREE 10 ML: 5 INJECTION INTRAVENOUS at 01:09

## 2024-09-23 NOTE — NURSING
PICC dressing changed using sterile technique, tolerated well. Pt discharged home in stable condition, future appts given.

## 2024-09-27 DIAGNOSIS — C34.11 MALIGNANT NEOPLASM OF UPPER LOBE OF RIGHT LUNG: Primary | ICD-10-CM

## 2024-09-30 ENCOUNTER — INFUSION (OUTPATIENT)
Dept: INFUSION THERAPY | Facility: HOSPITAL | Age: 69
End: 2024-09-30
Attending: INTERNAL MEDICINE
Payer: MEDICARE

## 2024-09-30 VITALS
DIASTOLIC BLOOD PRESSURE: 84 MMHG | SYSTOLIC BLOOD PRESSURE: 153 MMHG | OXYGEN SATURATION: 98 % | HEART RATE: 60 BPM | TEMPERATURE: 98 F | RESPIRATION RATE: 18 BRPM

## 2024-09-30 DIAGNOSIS — Z51.12 MAINTENANCE ANTINEOPLASTIC IMMUNOTHERAPY: ICD-10-CM

## 2024-09-30 DIAGNOSIS — C34.11 MALIGNANT NEOPLASM OF UPPER LOBE OF RIGHT LUNG: ICD-10-CM

## 2024-09-30 DIAGNOSIS — C34.11 MALIGNANT NEOPLASM OF UPPER LOBE OF RIGHT LUNG: Primary | ICD-10-CM

## 2024-09-30 DIAGNOSIS — C34.91 SQUAMOUS CELL CARCINOMA OF RIGHT LUNG: ICD-10-CM

## 2024-09-30 DIAGNOSIS — C34.01 LUNG CANCER, MAIN BRONCHUS, RIGHT: Primary | ICD-10-CM

## 2024-09-30 RX ORDER — SODIUM CHLORIDE 0.9 % (FLUSH) 0.9 %
10 SYRINGE (ML) INJECTION
OUTPATIENT
Start: 2024-09-30

## 2024-09-30 RX ORDER — SODIUM CHLORIDE 0.9 % (FLUSH) 0.9 %
10 SYRINGE (ML) INJECTION
Status: DISCONTINUED | OUTPATIENT
Start: 2024-09-30 | End: 2024-09-30 | Stop reason: HOSPADM

## 2024-09-30 RX ORDER — HEPARIN 100 UNIT/ML
500 SYRINGE INTRAVENOUS
Status: DISCONTINUED | OUTPATIENT
Start: 2024-09-30 | End: 2024-09-30 | Stop reason: HOSPADM

## 2024-09-30 RX ORDER — HEPARIN 100 UNIT/ML
500 SYRINGE INTRAVENOUS
OUTPATIENT
Start: 2024-09-30

## 2024-09-30 NOTE — NURSING
Flush single lumen PICC, noted with fluid leaking from insertion site. Notified care team. Received order for PICC removal. Care team placed order for new PICC placement. PICC Catheter  41 cm removed with tip intact, applied pressure, no bleeding noted, applied petroleum gauze, sterile gauze, teraderm, wrap with coban. Instruction given to patient. Voiced understanding.

## 2024-10-03 ENCOUNTER — HOSPITAL ENCOUNTER (OUTPATIENT)
Dept: RADIOLOGY | Facility: HOSPITAL | Age: 69
Discharge: HOME | End: 2024-10-03
Attending: NURSE PRACTITIONER
Payer: MEDICARE

## 2024-10-03 DIAGNOSIS — C34.11 MALIGNANT NEOPLASM OF UPPER LOBE OF RIGHT LUNG: ICD-10-CM

## 2024-10-03 PROCEDURE — 71260 CT THORAX DX C+: CPT | Mod: TC

## 2024-10-03 PROCEDURE — 25500020 PHARM REV CODE 255: Performed by: NURSE PRACTITIONER

## 2024-10-03 RX ADMIN — IOHEXOL 100 ML: 350 INJECTION, SOLUTION INTRAVENOUS at 12:10

## 2024-10-04 ENCOUNTER — HOSPITAL ENCOUNTER (OUTPATIENT)
Facility: HOSPITAL | Age: 69
Discharge: HOME | End: 2024-10-04
Attending: INTERNAL MEDICINE | Admitting: INTERNAL MEDICINE
Payer: MEDICARE

## 2024-10-04 VITALS
RESPIRATION RATE: 17 BRPM | SYSTOLIC BLOOD PRESSURE: 157 MMHG | HEART RATE: 55 BPM | TEMPERATURE: 99 F | OXYGEN SATURATION: 96 % | DIASTOLIC BLOOD PRESSURE: 94 MMHG

## 2024-10-04 DIAGNOSIS — C34.11 MALIGNANT NEOPLASM OF UPPER LOBE OF RIGHT LUNG: ICD-10-CM

## 2024-10-04 DIAGNOSIS — Z51.12 MAINTENANCE ANTINEOPLASTIC IMMUNOTHERAPY: ICD-10-CM

## 2024-10-04 PROCEDURE — 36573 INSJ PICC RS&I 5 YR+: CPT

## 2024-10-04 PROCEDURE — 36569 INSJ PICC 5 YR+ W/O IMAGING: CPT

## 2024-10-04 PROCEDURE — C1751 CATH, INF, PER/CENT/MIDLINE: HCPCS

## 2024-10-04 NOTE — PROCEDURES
Deondre Ocampo IV is a 69 y.o. male patient.    Temp: 98.6 °F (37 °C) (10/04/24 0848)  Pulse: (!) 55 (10/04/24 0848)  Resp: 17 (10/04/24 0848)  BP: (!) 157/94 (10/04/24 0848)  SpO2: 96 % (10/04/24 0848)    PICC  Date/Time: 10/4/2024 9:21 AM  Performed by: Rambo Mosley, RN  Consent Done: Yes  Time out: Immediately prior to procedure a time out was called to verify the correct patient, procedure, equipment, support staff and site/side marked as required  Indications: med administration and vascular access  Anesthesia: local infiltration  Local anesthetic: lidocaine 1% without epinephrine  Anesthetic Total (mL): 5  Preparation: skin prepped with ChloraPrep  Skin prep agent dried: skin prep agent completely dried prior to procedure  Sterile barriers: all five maximum sterile barriers used - cap, mask, sterile gown, sterile gloves, and large sterile sheet  Hand hygiene: hand hygiene performed prior to central venous catheter insertion  Location details: left brachial  Catheter type: double lumen  Catheter size: 5 Fr  Catheter Length: 44cm    Ultrasound guidance: yes  Vessel Caliber: patent, compressibility normal  Vascular Doppler: not done  Needle advanced into vessel with real time Ultrasound guidance.  Guidewire confirmed in vessel.  Sterile sheath used.  no esophageal manometryNumber of attempts: 1  Post-procedure: chlorhexidine patch, blood return through all ports and sterile dressing applied    Assessment: placement verified by x-ray  Complications: none          Rambo Mosley rN  10/4/2024

## 2024-10-04 NOTE — DISCHARGE INSTRUCTIONS
PICC/MIDLINE instructions:    A chest x-ray will be taken to confirm placement for your PICC line. If you received a midline, no x-ray is needed.    You did not receive anesthesia for this procedure, so you may resume normal activities.  No blood pressures or needle sticks to affected extremity.  Keep line clean and dry.  Okay to drive and resume regular diet. Nausea is not an expected finding after this procedure.  Report to ER with any SUDDEN bleeding from site, signs of infection, shortness of breath or severe discomfort.    BLEEDING: if you experience uncontrollable bleeding, contact your doctor and go to ER.    NAUSEA: due to the anesthesia, you may experience nausea for up to 24 hours. If nausea and vomiting last longer, contact your doctor.     INFECTION:  watch for any signs or symptoms of infection such as chills, fever, redness or drainage at surgical site. Notify your doctor.     PAIN : take your pain medications as directed. If the pain medications are not helping, notify your doctor.

## 2024-10-07 ENCOUNTER — HOSPITAL ENCOUNTER (OUTPATIENT)
Dept: CARDIOLOGY | Facility: HOSPITAL | Age: 69
Discharge: HOME | End: 2024-10-07
Attending: INTERNAL MEDICINE
Payer: MEDICARE

## 2024-10-07 ENCOUNTER — INFUSION (OUTPATIENT)
Dept: INFUSION THERAPY | Facility: HOSPITAL | Age: 69
End: 2024-10-07
Attending: INTERNAL MEDICINE
Payer: MEDICARE

## 2024-10-07 ENCOUNTER — OFFICE VISIT (OUTPATIENT)
Dept: HEMATOLOGY/ONCOLOGY | Facility: CLINIC | Age: 69
End: 2024-10-07
Payer: MEDICARE

## 2024-10-07 VITALS
OXYGEN SATURATION: 98 % | HEART RATE: 56 BPM | HEIGHT: 72 IN | BODY MASS INDEX: 30.86 KG/M2 | SYSTOLIC BLOOD PRESSURE: 156 MMHG | DIASTOLIC BLOOD PRESSURE: 88 MMHG | TEMPERATURE: 98 F | WEIGHT: 227.88 LBS | RESPIRATION RATE: 17 BRPM

## 2024-10-07 DIAGNOSIS — Z51.12 MAINTENANCE ANTINEOPLASTIC IMMUNOTHERAPY: ICD-10-CM

## 2024-10-07 DIAGNOSIS — C34.91 SQUAMOUS CELL CARCINOMA OF RIGHT LUNG: ICD-10-CM

## 2024-10-07 DIAGNOSIS — C34.91 SQUAMOUS CELL CARCINOMA OF RIGHT LUNG: Primary | ICD-10-CM

## 2024-10-07 DIAGNOSIS — M79.602 LEFT ARM PAIN: ICD-10-CM

## 2024-10-07 DIAGNOSIS — M79.89 LEFT ARM SWELLING: ICD-10-CM

## 2024-10-07 DIAGNOSIS — C34.01 LUNG CANCER, MAIN BRONCHUS, RIGHT: Primary | ICD-10-CM

## 2024-10-07 DIAGNOSIS — R53.83 FATIGUE DUE TO TREATMENT: ICD-10-CM

## 2024-10-07 DIAGNOSIS — E03.2 DRUG-INDUCED HYPOTHYROIDISM: ICD-10-CM

## 2024-10-07 DIAGNOSIS — K76.9 LIVER LESION: ICD-10-CM

## 2024-10-07 PROCEDURE — 3079F DIAST BP 80-89 MM HG: CPT | Mod: CPTII,S$GLB,, | Performed by: INTERNAL MEDICINE

## 2024-10-07 PROCEDURE — 25000003 PHARM REV CODE 250: Performed by: INTERNAL MEDICINE

## 2024-10-07 PROCEDURE — 3008F BODY MASS INDEX DOCD: CPT | Mod: CPTII,S$GLB,, | Performed by: INTERNAL MEDICINE

## 2024-10-07 PROCEDURE — 1101F PT FALLS ASSESS-DOCD LE1/YR: CPT | Mod: CPTII,S$GLB,, | Performed by: INTERNAL MEDICINE

## 2024-10-07 PROCEDURE — 36592 COLLECT BLOOD FROM PICC: CPT

## 2024-10-07 PROCEDURE — 1126F AMNT PAIN NOTED NONE PRSNT: CPT | Mod: CPTII,S$GLB,, | Performed by: INTERNAL MEDICINE

## 2024-10-07 PROCEDURE — 99215 OFFICE O/P EST HI 40 MIN: CPT | Mod: S$GLB,,, | Performed by: INTERNAL MEDICINE

## 2024-10-07 PROCEDURE — 3077F SYST BP >= 140 MM HG: CPT | Mod: CPTII,S$GLB,, | Performed by: INTERNAL MEDICINE

## 2024-10-07 PROCEDURE — 4010F ACE/ARB THERAPY RXD/TAKEN: CPT | Mod: CPTII,S$GLB,, | Performed by: INTERNAL MEDICINE

## 2024-10-07 PROCEDURE — 3288F FALL RISK ASSESSMENT DOCD: CPT | Mod: CPTII,S$GLB,, | Performed by: INTERNAL MEDICINE

## 2024-10-07 PROCEDURE — 1159F MED LIST DOCD IN RCRD: CPT | Mod: CPTII,S$GLB,, | Performed by: INTERNAL MEDICINE

## 2024-10-07 PROCEDURE — 93971 EXTREMITY STUDY: CPT | Mod: LT

## 2024-10-07 PROCEDURE — 99999 PR PBB SHADOW E&M-EST. PATIENT-LVL IV: CPT | Mod: PBBFAC,,, | Performed by: INTERNAL MEDICINE

## 2024-10-07 PROCEDURE — 63600175 PHARM REV CODE 636 W HCPCS: Mod: JZ,JG | Performed by: INTERNAL MEDICINE

## 2024-10-07 PROCEDURE — 1160F RVW MEDS BY RX/DR IN RCRD: CPT | Mod: CPTII,S$GLB,, | Performed by: INTERNAL MEDICINE

## 2024-10-07 PROCEDURE — 96413 CHEMO IV INFUSION 1 HR: CPT

## 2024-10-07 RX ORDER — EPINEPHRINE 0.3 MG/.3ML
0.3 INJECTION SUBCUTANEOUS ONCE AS NEEDED
Status: CANCELLED | OUTPATIENT
Start: 2024-10-07

## 2024-10-07 RX ORDER — EPINEPHRINE 0.3 MG/.3ML
0.3 INJECTION SUBCUTANEOUS ONCE AS NEEDED
Status: DISCONTINUED | OUTPATIENT
Start: 2024-10-07 | End: 2024-10-07 | Stop reason: HOSPADM

## 2024-10-07 RX ORDER — SODIUM CHLORIDE 0.9 % (FLUSH) 0.9 %
10 SYRINGE (ML) INJECTION
Status: CANCELLED | OUTPATIENT
Start: 2024-10-07

## 2024-10-07 RX ORDER — SODIUM CHLORIDE 0.9 % (FLUSH) 0.9 %
10 SYRINGE (ML) INJECTION
Status: DISCONTINUED | OUTPATIENT
Start: 2024-10-07 | End: 2024-10-07 | Stop reason: HOSPADM

## 2024-10-07 RX ORDER — HEPARIN 100 UNIT/ML
500 SYRINGE INTRAVENOUS
Status: CANCELLED | OUTPATIENT
Start: 2024-10-07

## 2024-10-07 RX ORDER — HEPARIN 100 UNIT/ML
500 SYRINGE INTRAVENOUS
Status: DISCONTINUED | OUTPATIENT
Start: 2024-10-07 | End: 2024-10-07 | Stop reason: HOSPADM

## 2024-10-07 RX ORDER — DIPHENHYDRAMINE HYDROCHLORIDE 50 MG/ML
50 INJECTION INTRAMUSCULAR; INTRAVENOUS ONCE AS NEEDED
Status: CANCELLED | OUTPATIENT
Start: 2024-10-07

## 2024-10-07 RX ORDER — CYCLOBENZAPRINE HCL 10 MG
10 TABLET ORAL
COMMUNITY
Start: 2024-09-10

## 2024-10-07 RX ORDER — DIPHENHYDRAMINE HYDROCHLORIDE 50 MG/ML
50 INJECTION INTRAMUSCULAR; INTRAVENOUS ONCE AS NEEDED
Status: DISCONTINUED | OUTPATIENT
Start: 2024-10-07 | End: 2024-10-07 | Stop reason: HOSPADM

## 2024-10-07 RX ORDER — TRAMADOL HYDROCHLORIDE 50 MG/1
50 TABLET ORAL EVERY 6 HOURS PRN
COMMUNITY
Start: 2024-09-10

## 2024-10-07 RX ORDER — TAMSULOSIN HYDROCHLORIDE 0.4 MG/1
CAPSULE ORAL
COMMUNITY

## 2024-10-07 RX ADMIN — SODIUM CHLORIDE 1500 MG: 9 INJECTION, SOLUTION INTRAVENOUS at 02:10

## 2024-10-07 NOTE — PLAN OF CARE
Received C1D1, tolerated well. PICC catheter 44 cm removed with tip intact, applied pressure, sterile gauze, teraderm, wrap with coban. Assisted patient to hospital for ultrasound to left arm.

## 2024-10-07 NOTE — PROGRESS NOTES
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 Dx: 2/10/2023    Present treatment:  maintenance immunotherapy (Durvalumab)-11/15/2023-10/7/2024    Treatment/Oncology history:  Carbo/Taxol + RT completed 7/12/2023  maintenance immunotherapy (Durvalumab)-11/15/2023-10/7/2024      Imaging:  PET-CT 03/10/2023:  Hypermetabolic mass in the right supra hilar region measuring 4.7 x 2.9 cm SUV of 20.8.  The mass abuts the right main pulmonary artery and right main stem bronchus.  Subcentimeter mediastinal lymph node noted.  One of LN slightly hypermetabolic with SUV of 3 and is located laterally.  Patchy hypermetabolism within the liver as suspected, however, there are few tiny focal areas of hypermetabolism out of proportion to the rest of the liver.  At least 1 of this corresponds to a low-density focus seen in the noncontrast exam.  CT 5/12/2023: changes consistent with COPD and emphysema in the lungs bilaterally with multiple bulla bleb seen. mass seen in the right hilum extending into the right upper lobe.  It is somewhat infiltrative.  This was seen on the prior PET-CT as well.  The findings are not significantly changed.  There are multiple associated satellite nodules in the right upper lobe.  Rough dimensions of the hilar component of the mass is 5.4 x 5.1 cm and rough measurements of the dominant lesion in the right upper lobe is 11 cm x 3.6 cm.  The mass extends into the right mainstem and right upper lobe bronchus.  This was seen on the prior examination as well.  There is narrowing of the right upper lobe bronchus there is some narrowing of the right upper lobe pulmonary arteries.  Findings are similar to the prior examination.  Scattered areas of punctate subcentimeter nodularity is seen in the right lower lobe.   These are too small to characterize and similar changes were seen previously.    No pleural effusion is seen.  Thoracic aorta is normal in caliber.  There is some mediastinal lymphadenopathy seen.  Reference lymph node is measured in the precarinal region on image number 39 series 2 at 1.5 x 0.9 cm.  This is similar to the prior examination.  Scattered punctate areas of hypoattenuation are seen throughout the liver.  Similar changes were seen previously.  The lesions are too small to characterize but may represent cysts.  The largest lesion is seen in segment 4.  It does not enhance and measures 2 cm x 1.5 cm.  Findings may represent a cyst.  No adrenal nodules are seen.  CT Head  @ [facility] [place] [facility]. Mild chronic white matter changes. Old left caudate lobe lesion. 2. No evidence of metastatic disease.  CT C/A/P 9/15/2023:  1. Interval resolution of previously seen endobronchial right hilar mass.  2. Improved aeration of the right upper lobe.  There are residual but improved patchy irregular opacities in the right upper lobe, predominantly peripherally located  3. Interval improvement of mediastinal lymph nodes  4. Unchanged hepatic cyst and too small to characterize hepatic hypodensities  CT Chest 2024:  1. No detrimental change from previous exam.  2. Similar small mediastinal lymph nodes and patchy irregular opacities in the right upper lobe  3. Trace right pleural fluid, similar to previous.   Bilateral diagnostic bilateral mammogram with right breast US 2024: IMPRESSION: BENIGN  1. No mammographic evidence of malignancy in either breast.  No sonographic evidence of malignancy in the subareolar region of either breast.     2. Minimal gynecomastia in the bilateral breasts is benign.  The patient was encouraged to follow up with his primary care provider, as clinical correlation (including correlation with current medications, laboratory values, and physical examination) is recommended to determine an  underlying etiology.    CT C/A/P 4/4/2024: level 4R paratracheal lymph node measures 9 x 9 mm, enlarged compared with 5 x 5 mm previously. The superior mediastinal paraesophageal adenopathy is unchanged. The subcarinal lymphadenopathy is unchanged.   1. Interval enlargement of a level 4R paratracheal lymph nodes suspicious for progressive disease.  The remaining mediastinal lymph nodes are unchanged.  2. Stable appearance of the interlobular septal thickening and bronchiectasis in the right upper lobe favored to represent post treatment effect.  3. Slightly improved trace right pleural effusion.  CT Chest 10/3/2024:  Stable areas of pleural thickening and scarring with and fibrosis in the right upper hemithorax  Stable right paratracheal/paraesophageal lymph node  Findings seen consistent with COPD and emphysema in the lungs       Pathology:  02/10/2023:  Right upper lobe lung needle biopsy: Scattered highly atypical squamous cells, suspicious for squamous cell carcinoma in a background of abundant acute inflammation and necrosis  Right upper lobe, brushing:  Positive for malignant cells.  Squamous cell carcinoma, moderately differentiated.  Right upper lobe biopsy squamous cell carcinoma, moderately differentiated with focal keratinization.  Right lung washings positive for malignant cells.  Squamous cell carcinoma in a background of abundant necrosis.      CLINICAL HISTORY:       Patient: Deondre Ocampo IV is a 69 y.o. male.  Kindly referred for newly diagnosed squamous cell carcinoma of the lung.    He reports that he was being evaluated for a right side pain went imaging studies showed a mass in the lung.  I do not have the CTs.  But he eventually underwent bronchoscopy that showed squamous cell carcinoma.  PET-CT showed the right suprahilar mass with possible mediastinal lymphadenopathy and questionable liver lesions.  He is here to discuss further recommendations.    He is here with his wife.  Patient has  history of 53 pack year smoking.  He reports that his breathing is fine.  He denies any chest pain, short of breath or coughing.  No hemoptysis.  He has an appointment to see the radiation oncologist next week.    Chief Complaint: Results (CT CAP Results.)      Interval History:    He completed 6 cycle of weekly Carbo/Taxol on 7/12/23 along with XRT.     Patient presents today to discuss CT scan results and for follow up and continuation of Imfinzi, C12 due today, accompanied by his wife. This will be his last cycle.  He complains of joint pain, this is chronic. He is followed by his back doctor. He admits to not drinking enough water and still smoking, he says he smokes when his nerves get bad. They are caring for a grandson who is living with them. Swelling noted to left arm during physical exam. He had new PICC line placed on Friday 10/4/24.        Past Medical History:   Diagnosis Date    Diabetes mellitus     High cholesterol     Hypertension     Lung cancer       Past Surgical History:   Procedure Laterality Date    BACK SURGERY      DEBRIDEMENT TENNIS ELBOW Right     PERIPHERALLY INSERTED CENTRAL CATHETER INSERTION Left 5/29/2023    Procedure: Insertion-picc;  Surgeon: Letha Silver MD;  Location: [facility] OR;  Service: Oncology;  Laterality: Left;    PERIPHERALLY INSERTED CENTRAL CATHETER INSERTION Left 7/12/2024    Procedure: Insertion-picc;  Surgeon: Letha Silver MD;  Location: [facility] OR;  Service: Oncology;  Laterality: Left;    PERIPHERALLY INSERTED CENTRAL CATHETER INSERTION Left 10/4/2024    Procedure: Insertion-picc;  Surgeon: Leticia Addison FNP;  Location: [facility] OR;  Service: Medicine;  Laterality: Left;    right arm       Family History   Problem Relation Name Age of Onset    Coronary artery disease Mother      No Known Problems Father      Leukemia Daughter  42    Coronary artery disease Brother      Bone cancer Brother  35    Diabetes Brother      Heart failure Brother      Diabetes  Brother      Leukemia Brother      Breast cancer Neg Hx          neg fam history of breast cancer            Review of patient's allergies indicates:   Allergen Reactions    Ace inhibitors Swelling     Other reaction(s): Angioedema, Respiratory distress    Penicillins Itching and Rash      Current Outpatient Medications on File Prior to Visit   Medication Sig Dispense Refill    amLODIPine (NORVASC) 5 MG tablet 1 tablet Orally Once a day for 30 day(s)      aspirin (ECOTRIN) 81 MG EC tablet Take 81 mg by mouth once daily.      cyclobenzaprine (FLEXERIL) 10 MG tablet Take 10 mg by mouth.      glimepiride (AMARYL) 2 MG tablet 1 tablet with breakfast or the first main meal of the day Orally Once a day for 30 day(s)      hydroCHLOROthiazide (HYDRODIURIL) 25 MG tablet 1 tablet in the morning Orally Once a day for 30 day(s)      losartan (COZAAR) 100 MG tablet Take 100 mg by mouth once daily.      losartan (COZAAR) 100 MG tablet 1 tablet Orally Once a day for 30 day(s)      metoprolol tartrate (LOPRESSOR) 50 MG tablet Take 50 mg by mouth 2 (two) times daily.      naloxone (NARCAN) 4 mg/actuation Spry SMARTSIG:Both Nares      oxyCODONE-acetaminophen (PERCOCET)  mg per tablet Take 1 tablet by mouth every 6 (six) hours as needed for Pain. for pain. 30 tablet 0    pantoprazole (PROTONIX) 40 MG tablet Take 40 mg by mouth once daily.      pravastatin (PRAVACHOL) 20 MG tablet Take 20 mg by mouth once daily.      tamsulosin (FLOMAX) 0.4 mg Cap TAKE 1 CAPSULE BY MOUTH ONCE DAILY 30 MINUTES AFTER THE SAME MEAL EACH DAY      traMADoL (ULTRAM) 50 mg tablet Take 50 mg by mouth every 6 (six) hours as needed.      dulaglutide (TRULICITY SUBQ) Inject into the skin. (Patient not taking: Reported on 10/7/2024)       No current facility-administered medications on file prior to visit.      Review of Systems   Constitutional:  Positive for fatigue (better). Negative for activity change, appetite change, chills, diaphoresis, fever and  unexpected weight change.   HENT:  Negative for nasal congestion, mouth sores, nosebleeds, sinus pressure/congestion, sore throat and trouble swallowing.    Eyes: Negative.    Respiratory:  Positive for cough (chronic but better). Negative for shortness of breath.    Cardiovascular:  Negative for chest pain and palpitations.   Gastrointestinal:  Negative for abdominal distention, abdominal pain, blood in stool, change in bowel habit, constipation, diarrhea, nausea and vomiting.   Endocrine: Negative.    Genitourinary:  Negative for bladder incontinence, decreased urine volume, difficulty urinating, dysuria, frequency, hematuria and urgency.   Musculoskeletal:  Positive for back pain (same) and neck pain (same). Negative for arthralgias, gait problem, joint swelling, leg pain and myalgias.   Integumentary:  Negative for rash.   Allergic/Immunologic: Negative.    Neurological:  Negative for dizziness, tremors, syncope, weakness, light-headedness, numbness, headaches and memory loss.   Hematological:  Negative for adenopathy. Does not bruise/bleed easily.   Psychiatric/Behavioral:  Negative for agitation, confusion, hallucinations, sleep disturbance and suicidal ideas. The patient is not nervous/anxious.           Vitals:    10/07/24 1252   BP: (!) 156/88   BP Location: Right arm   Patient Position: Sitting   Pulse: (!) 56   Resp: 17   Temp: 98.1 °F (36.7 °C)   TempSrc: Oral   SpO2: 98%   Weight: 103.4 kg (227 lb 14.4 oz)   Height: 6' (1.829 m)       Wt Readings from Last 6 Encounters:   10/07/24 103.4 kg (227 lb 14.4 oz)   09/23/24 103 kg (227 lb)   09/09/24 100.2 kg (220 lb 14.4 oz)   08/19/24 98.7 kg (217 lb 11.2 oz)   08/12/24 100.4 kg (221 lb 4.8 oz)   08/12/24 100.4 kg (221 lb 4.8 oz)        Body mass index is 30.91 kg/m².  Body surface area is 2.29 meters squared.  Physical Exam  Vitals and nursing note reviewed.   Constitutional:       General: He is not in acute distress.     Appearance: Normal appearance.    HENT:      Head: Normocephalic and atraumatic.      Mouth/Throat:      Mouth: Mucous membranes are moist.   Eyes:      General: No scleral icterus.     Extraocular Movements: Extraocular movements intact.      Conjunctiva/sclera: Conjunctivae normal.   Neck:      Vascular: No JVD.   Cardiovascular:      Rate and Rhythm: Normal rate and regular rhythm.      Heart sounds: No murmur heard.  Pulmonary:      Effort: Pulmonary effort is normal.      Breath sounds: Decreased breath sounds present. No wheezing or rhonchi.   Chest:   Breasts:     Right: No swelling, bleeding, inverted nipple, mass, nipple discharge or skin change.      Left: Normal.   Abdominal:      General: Bowel sounds are normal. There is no distension.      Palpations: Abdomen is soft.      Tenderness: There is no abdominal tenderness.   Musculoskeletal:         General: No swelling or deformity.      Cervical back: Neck supple.   Lymphadenopathy:      Head:      Right side of head: No submandibular adenopathy.      Left side of head: No submandibular adenopathy.      Cervical: No cervical adenopathy.      Upper Body:      Right upper body: No supraclavicular or axillary adenopathy.      Left upper body: No supraclavicular or axillary adenopathy.      Lower Body: No right inguinal adenopathy. No left inguinal adenopathy.   Skin:     General: Skin is warm.      Coloration: Skin is not jaundiced.      Findings: No rash.   Neurological:      Mental Status: He is alert and oriented to person, place, and time.      Cranial Nerves: Cranial nerves 2-12 are intact.      Comments: Dropping on left side of his face. Left eye is more open than his right eye.    Psychiatric:         Attention and Perception: Attention normal.         Behavior: Behavior is cooperative.       ECOG SCORE    1 - Restricted in strenuous activity-ambulatory and able to carry out work of a light nature         Laboratory:  CBC with Differential:  Lab Results   Component Value Date     WBC 4.20 (L) 10/07/2024    RBC 3.96 (L) 10/07/2024    HGB 13.0 (L) 10/07/2024    HCT 38.7 (L) 10/07/2024    MCV 97.7 (H) 10/07/2024    MCH 32.8 (H) 10/07/2024    MCHC 33.6 10/07/2024    RDW 12.5 10/07/2024     10/07/2024    MPV 9.1 10/07/2024        CMP:  Sodium   Date Value Ref Range Status   10/07/2024 137 136 - 145 mmol/L Final     Potassium   Date Value Ref Range Status   10/07/2024 3.5 3.5 - 5.1 mmol/L Final     Chloride   Date Value Ref Range Status   10/07/2024 104 98 - 107 mmol/L Final     CO2   Date Value Ref Range Status   10/07/2024 24 23 - 31 mmol/L Final     Blood Urea Nitrogen   Date Value Ref Range Status   10/07/2024 7.9 (L) 8.4 - 25.7 mg/dL Final     Creatinine   Date Value Ref Range Status   10/07/2024 0.89 0.73 - 1.18 mg/dL Final     Calcium   Date Value Ref Range Status   10/07/2024 8.5 (L) 8.8 - 10.0 mg/dL Final     Albumin   Date Value Ref Range Status   10/07/2024 3.4 3.4 - 4.8 g/dL Final     Bilirubin Total   Date Value Ref Range Status   10/07/2024 0.6 <=1.5 mg/dL Final     ALP   Date Value Ref Range Status   10/07/2024 77 40 - 150 unit/L Final     AST   Date Value Ref Range Status   10/07/2024 7 5 - 34 unit/L Final     ALT   Date Value Ref Range Status   10/07/2024 <25 0 - 55 unit/L Final         Assessment:       1. Lung cancer, main bronchus, right    2. Squamous cell carcinoma of right lung    3. Maintenance antineoplastic immunotherapy    4. Drug-induced hypothyroidism    5. Liver lesion    6. Left arm swelling    7. Left arm pain    8. Fatigue due to treatment      1) cT4N2?Mx- Stage III Squamous cell carcinoma of the right upper lobe  -Large hilar mass that extends to the RUL  -Completed 6 cycle of weekly Carbo/Taxol on 7/12/23 along with XRT.   -Restaging CT with GAURAV  -Surgical reevaluation -- no surgery recommended as restaging scan with GAURAV  -maintenance immunotherapy (Durvalumab)-11/15/2023-10/7/2024  -Restaging CT C/A/P 4/4/2024: level 4R paratracheal lymph node measures 9  x 9 mm, enlarged compared with 5 x 5 mm previously. The superior mediastinal paraesophageal adenopathy is unchanged. The subcarinal lymphadenopathy is unchanged. The remaining mediastinal lymph nodes are unchanged. Slightly improved trace right pleural effusion.  -restaging CT Chest 10/3/2024: Stable areas of pleural thickening and scarring with and fibrosis in the right upper hemithorax. Stable right paratracheal/paraesophageal lymph node. Findings seen consistent with COPD and emphysema in the lungs    2) Liver hypodensities--> suggesting cysts      Plan:       Patient with progression of level 4R paratracheal lymph node.  This could be inflammatory versus progression of his disease.  At this time is still small and we will continue present treatment.  We will continue to monitor closely.  If continue to grow may benefit of surgical evaluation.     Proceed with C12 immunotherapy (Durvalumab) today - FINAL CYCLE  OKAY TO PULL PICC LINE TODAY  Continue follow ups with Dr. Hoff  CT C/A/P every 3 months - Due 1/2025 - will order when closer  PET CT ordered to be done prior to next visit  RTC in 6 weeks with MD for results/same day CBC, CMP, TSH  -  pt requests appointments @ 11 am or 1 pm  Will order NIVA study of left arm today after infusion to evaluate left arm swelling and rule out DVT - will call with results if abnormal  Smoking cessation encouraged    Encouraged to call with questions or problems  The patient was given ample opportunity to ask questions and they were all answered to satisfaction; patient demonstrated understanding of what we discussed and is agreeable to the plan.    Letha Silver MD  Hematology/Oncology      Professional Services   I, Jenelle Waters LPN, acted solely as a scribe for and in the presence of Dr. Letha Silver, who performed these services.

## 2024-10-16 ENCOUNTER — PATIENT MESSAGE (OUTPATIENT)
Dept: RESEARCH | Facility: HOSPITAL | Age: 69
End: 2024-10-16
Payer: MEDICARE

## 2024-10-17 ENCOUNTER — PATIENT MESSAGE (OUTPATIENT)
Dept: RESEARCH | Facility: HOSPITAL | Age: 69
End: 2024-10-17
Payer: MEDICARE

## 2024-12-06 ENCOUNTER — HOSPITAL ENCOUNTER (OUTPATIENT)
Dept: RADIOLOGY | Facility: HOSPITAL | Age: 69
Discharge: HOME | End: 2024-12-06
Attending: INTERNAL MEDICINE
Payer: MEDICARE

## 2024-12-06 DIAGNOSIS — C34.91 SQUAMOUS CELL CARCINOMA OF RIGHT LUNG: ICD-10-CM

## 2024-12-06 DIAGNOSIS — Z51.12 MAINTENANCE ANTINEOPLASTIC IMMUNOTHERAPY: ICD-10-CM

## 2024-12-06 DIAGNOSIS — C34.01 LUNG CANCER, MAIN BRONCHUS, RIGHT: ICD-10-CM

## 2024-12-06 DIAGNOSIS — E03.2 DRUG-INDUCED HYPOTHYROIDISM: ICD-10-CM

## 2024-12-06 DIAGNOSIS — K76.9 LIVER LESION: ICD-10-CM

## 2024-12-06 PROCEDURE — 78815 PET IMAGE W/CT SKULL-THIGH: CPT | Mod: TC

## 2024-12-06 PROCEDURE — A9552 F18 FDG: HCPCS | Performed by: INTERNAL MEDICINE

## 2024-12-06 RX ORDER — FLUDEOXYGLUCOSE F18 500 MCI/ML
10 INJECTION INTRAVENOUS
Status: COMPLETED | OUTPATIENT
Start: 2024-12-06 | End: 2024-12-06

## 2024-12-06 RX ADMIN — FLUDEOXYGLUCOSE F-18 10.3 MILLICURIE: 500 INJECTION INTRAVENOUS at 01:12

## 2024-12-09 ENCOUNTER — LAB VISIT (OUTPATIENT)
Dept: LAB | Facility: HOSPITAL | Age: 69
End: 2024-12-09
Attending: INTERNAL MEDICINE
Payer: MEDICARE

## 2024-12-09 ENCOUNTER — OFFICE VISIT (OUTPATIENT)
Dept: HEMATOLOGY/ONCOLOGY | Facility: CLINIC | Age: 69
End: 2024-12-09
Payer: MEDICARE

## 2024-12-09 VITALS
BODY MASS INDEX: 30.53 KG/M2 | SYSTOLIC BLOOD PRESSURE: 143 MMHG | HEART RATE: 72 BPM | TEMPERATURE: 98 F | DIASTOLIC BLOOD PRESSURE: 89 MMHG | WEIGHT: 225.38 LBS | HEIGHT: 72 IN | OXYGEN SATURATION: 96 %

## 2024-12-09 DIAGNOSIS — K76.9 LIVER LESION: ICD-10-CM

## 2024-12-09 DIAGNOSIS — E03.2 DRUG-INDUCED HYPOTHYROIDISM: ICD-10-CM

## 2024-12-09 DIAGNOSIS — C34.01 LUNG CANCER, MAIN BRONCHUS, RIGHT: ICD-10-CM

## 2024-12-09 DIAGNOSIS — C34.91 SQUAMOUS CELL CARCINOMA OF RIGHT LUNG: ICD-10-CM

## 2024-12-09 DIAGNOSIS — C34.90 MALIGNANT NEOPLASM OF UNSPECIFIED PART OF UNSPECIFIED BRONCHUS OR LUNG: ICD-10-CM

## 2024-12-09 DIAGNOSIS — R93.89 ABNORMAL FINDINGS ON DIAGNOSTIC IMAGING OF OTHER SPECIFIED BODY STRUCTURES: ICD-10-CM

## 2024-12-09 DIAGNOSIS — Z51.12 MAINTENANCE ANTINEOPLASTIC IMMUNOTHERAPY: ICD-10-CM

## 2024-12-09 DIAGNOSIS — C34.01 LUNG CANCER, MAIN BRONCHUS, RIGHT: Primary | ICD-10-CM

## 2024-12-09 LAB
ALBUMIN SERPL-MCNC: 3.4 G/DL (ref 3.4–4.8)
ALBUMIN/GLOB SERPL: 0.8 RATIO (ref 1.1–2)
ALP SERPL-CCNC: 76 UNIT/L (ref 40–150)
ALT SERPL-CCNC: 6 UNIT/L (ref 0–55)
ANION GAP SERPL CALC-SCNC: 5 MEQ/L
AST SERPL-CCNC: 10 UNIT/L (ref 5–34)
BASOPHILS # BLD AUTO: 0.01 X10(3)/MCL
BASOPHILS NFR BLD AUTO: 0.2 %
BILIRUB SERPL-MCNC: 0.6 MG/DL
BUN SERPL-MCNC: 7 MG/DL (ref 8.4–25.7)
CALCIUM SERPL-MCNC: 9.1 MG/DL (ref 8.8–10)
CHLORIDE SERPL-SCNC: 105 MMOL/L (ref 98–107)
CO2 SERPL-SCNC: 27 MMOL/L (ref 23–31)
CREAT SERPL-MCNC: 1.1 MG/DL (ref 0.72–1.25)
CREAT/UREA NIT SERPL: 6
EOSINOPHIL # BLD AUTO: 0.06 X10(3)/MCL (ref 0–0.9)
EOSINOPHIL NFR BLD AUTO: 1.1 %
ERYTHROCYTE [DISTWIDTH] IN BLOOD BY AUTOMATED COUNT: 13.1 % (ref 11.5–17)
GFR SERPLBLD CREATININE-BSD FMLA CKD-EPI: >60 ML/MIN/1.73/M2
GLOBULIN SER-MCNC: 4.1 GM/DL (ref 2.4–3.5)
GLUCOSE SERPL-MCNC: 84 MG/DL (ref 82–115)
HCT VFR BLD AUTO: 40.4 % (ref 42–52)
HGB BLD-MCNC: 13.3 G/DL (ref 14–18)
IMM GRANULOCYTES # BLD AUTO: 0.01 X10(3)/MCL (ref 0–0.04)
IMM GRANULOCYTES NFR BLD AUTO: 0.2 %
LYMPHOCYTES # BLD AUTO: 0.96 X10(3)/MCL (ref 0.6–4.6)
LYMPHOCYTES NFR BLD AUTO: 18 %
MCH RBC QN AUTO: 31.7 PG (ref 27–31)
MCHC RBC AUTO-ENTMCNC: 32.9 G/DL (ref 33–36)
MCV RBC AUTO: 96.4 FL (ref 80–94)
MONOCYTES # BLD AUTO: 0.75 X10(3)/MCL (ref 0.1–1.3)
MONOCYTES NFR BLD AUTO: 14.1 %
NEUTROPHILS # BLD AUTO: 3.54 X10(3)/MCL (ref 2.1–9.2)
NEUTROPHILS NFR BLD AUTO: 66.4 %
PLATELET # BLD AUTO: 200 X10(3)/MCL (ref 130–400)
PMV BLD AUTO: 8.6 FL (ref 7.4–10.4)
POCT GLUCOSE: 66 MG/DL (ref 70–110)
POTASSIUM SERPL-SCNC: 3.6 MMOL/L (ref 3.5–5.1)
PROT SERPL-MCNC: 7.5 GM/DL (ref 5.8–7.6)
RBC # BLD AUTO: 4.19 X10(6)/MCL (ref 4.7–6.1)
SODIUM SERPL-SCNC: 137 MMOL/L (ref 136–145)
TSH SERPL-ACNC: 2.57 UIU/ML (ref 0.35–4.94)
WBC # BLD AUTO: 5.33 X10(3)/MCL (ref 4.5–11.5)

## 2024-12-09 PROCEDURE — 85025 COMPLETE CBC W/AUTO DIFF WBC: CPT

## 2024-12-09 PROCEDURE — 80053 COMPREHEN METABOLIC PANEL: CPT

## 2024-12-09 PROCEDURE — 36415 COLL VENOUS BLD VENIPUNCTURE: CPT

## 2024-12-09 PROCEDURE — 99999 PR PBB SHADOW E&M-EST. PATIENT-LVL IV: CPT | Mod: PBBFAC,,, | Performed by: INTERNAL MEDICINE

## 2024-12-09 PROCEDURE — 84443 ASSAY THYROID STIM HORMONE: CPT

## 2024-12-09 NOTE — PROGRESS NOTES
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 Dx: 2/10/2023    Present treatment:  Observation    Treatment/Oncology history:  Carbo/Taxol + RT completed 7/12/2023  maintenance immunotherapy (Durvalumab)-11/15/2023-10/7/2024      Imaging:  PET-CT 03/10/2023:  Hypermetabolic mass in the right supra hilar region measuring 4.7 x 2.9 cm SUV of 20.8.  The mass abuts the right main pulmonary artery and right main stem bronchus.  Subcentimeter mediastinal lymph node noted.  One of LN slightly hypermetabolic with SUV of 3 and is located laterally.  Patchy hypermetabolism within the liver as suspected, however, there are few tiny focal areas of hypermetabolism out of proportion to the rest of the liver.  At least 1 of this corresponds to a low-density focus seen in the noncontrast exam.  CT 5/12/2023: changes consistent with COPD and emphysema in the lungs bilaterally with multiple bulla bleb seen. mass seen in the right hilum extending into the right upper lobe.  It is somewhat infiltrative.  This was seen on the prior PET-CT as well.  The findings are not significantly changed.  There are multiple associated satellite nodules in the right upper lobe.  Rough dimensions of the hilar component of the mass is 5.4 x 5.1 cm and rough measurements of the dominant lesion in the right upper lobe is 11 cm x 3.6 cm.  The mass extends into the right mainstem and right upper lobe bronchus.  This was seen on the prior examination as well.  There is narrowing of the right upper lobe bronchus there is some narrowing of the right upper lobe pulmonary arteries.  Findings are similar to the prior examination.  Scattered areas of punctate subcentimeter nodularity is seen in the right lower lobe.  These are too small to characterize and similar  changes were seen previously.    No pleural effusion is seen.  Thoracic aorta is normal in caliber.  There is some mediastinal lymphadenopathy seen.  Reference lymph node is measured in the precarinal region on image number 39 series 2 at 1.5 x 0.9 cm.  This is similar to the prior examination.  Scattered punctate areas of hypoattenuation are seen throughout the liver.  Similar changes were seen previously.  The lesions are too small to characterize but may represent cysts.  The largest lesion is seen in segment 4.  It does not enhance and measures 2 cm x 1.5 cm.  Findings may represent a cyst.  No adrenal nodules are seen.  [place] [facility] Mild chronic white matter changes. Old left caudate lobe lesion. 2. No evidence of metastatic disease.  CT C/A/P 9/15/2023: Interval resolution of previously seen endobronchial right hilar mass. There are residual but improved patchy irregular opacities in the right upper lobe, predominantly peripherally located. Interval improvement of mediastinal lymph nodes. Unchanged hepatic cyst and too small to characterize hepatic hypodensities  CT Chest 2024: Similar small mediastinal lymph nodes and patchy irregular opacities in the right upper lobe. Trace right pleural fluid, similar to previous.  Bilateral diagnostic bilateral mammogram with right breast US 2024: BENIGN  CT C/A/P 2024: level 4R paratracheal lymph node measures 9 x 9 mm, enlarged compared with 5 x 5 mm previously. The superior mediastinal paraesophageal adenopathy is unchanged. The subcarinal lymphadenopathy is unchanged.   CT Chest 10/3/2024: Stable areas of pleural thickening and scarring with and fibrosis in the right upper hemithorax. Stable right paratracheal/paraesophageal LN  PET CT 2024: Small lymph node versus nodule at the deep margin of the left parotid lobe has a max SUV 4.7. Qualitatively similar to prior PET-CT.  There are post treatment changes with volume loss in the  right upper lobe.  Similar pleuroparenchymal opacities compared to prior diagnostic CT exam with mild diffuse FDG avidity, max SUV 4.1.   No discrete, focal nodular area of hypermetabolism. Reference right paratracheal lymph node has a max SUV of 3. Impression: There are post treatment changes at the right upper lobe with pleuroparenchymal opacities with mild uptake.  No overtly suspicious nodular area of uptake seen.       Pathology:  02/10/2023:  Right upper lobe lung needle biopsy: Scattered highly atypical squamous cells, suspicious for squamous cell carcinoma in a background of abundant acute inflammation and necrosis  Right upper lobe, brushing:  Positive for malignant cells.  Squamous cell carcinoma, moderately differentiated.  Right upper lobe biopsy squamous cell carcinoma, moderately differentiated with focal keratinization.  Right lung washings positive for malignant cells.  Squamous cell carcinoma in a background of abundant necrosis.      CLINICAL HISTORY:       Patient: Deondre Ocampo IV is a 69 y.o. male.  Kindly referred for newly diagnosed squamous cell carcinoma of the lung.    He reports that he was being evaluated for a right side pain went imaging studies showed a mass in the lung.  I do not have the CTs.  But he eventually underwent bronchoscopy that showed squamous cell carcinoma.  PET-CT showed the right suprahilar mass with possible mediastinal lymphadenopathy and questionable liver lesions.  He is here to discuss further recommendations.    He is here with his wife.  Patient has history of 53 pack year smoking.  He reports that his breathing is fine.  He denies any chest pain, short of breath or coughing.  No hemoptysis.  He has an appointment to see the radiation oncologist next week.    Chief Complaint:  Lung cancer      Interval History:    He completed 6 cycle of weekly Carbo/Taxol on 7/12/23 along with XRT. He then has maintenance immunotherapy  (Durvalumab)-11/15/2023-10/7/2024    12/9/2024: Patient presents today to discuss PET CT scan results accompanied by his wife.  He is doing good and voices no new concerns.  He admits still smokes.  No fever, chills, sweats.  No chest pain or short of breath.  He is still have chronic cough but is the same.  He also reports occasional runny nose due to allergies.  He does not take Claritin or any antihistaminic and is not willing to take them.  PET-CT with stable findings when compared to CT chest 10/03/2024, CT chest abdomen pelvis 06/28/2024, outside PET-CT 03/10/2023 .  Blood were discussed with the patient.        Past Medical History:   Diagnosis Date    Diabetes mellitus     High cholesterol     Hypertension     Lung cancer       Past Surgical History:   Procedure Laterality Date    BACK SURGERY      DEBRIDEMENT TENNIS ELBOW Right     PERIPHERALLY INSERTED CENTRAL CATHETER INSERTION Left 5/29/2023    Procedure: Insertion-picc;  Surgeon: Letha Silver MD;  Location: [facility] OR;  Service: Oncology;  Laterality: Left;    PERIPHERALLY INSERTED CENTRAL CATHETER INSERTION Left 7/12/2024    Procedure: Insertion-picc;  Surgeon: Letha Silver MD;  Location: [facility] OR;  Service: Oncology;  Laterality: Left;    PERIPHERALLY INSERTED CENTRAL CATHETER INSERTION Left 10/4/2024    Procedure: Insertion-picc;  Surgeon: Leticia Addison FNP;  Location: [facility];  Service: Medicine;  Laterality: Left;    right arm       Family History   Problem Relation Name Age of Onset    Coronary artery disease Mother      No Known Problems Father      Leukemia Daughter  42    Coronary artery disease Brother      Bone cancer Brother  35    Diabetes Brother      Heart failure Brother      Diabetes Brother      Leukemia Brother      Breast cancer Neg Hx          neg fam history of breast cancer            Review of patient's allergies indicates:   Allergen Reactions    Ace inhibitors Swelling     Other reaction(s): Angioedema, Respiratory  distress    Penicillins Itching and Rash      Current Outpatient Medications on File Prior to Visit   Medication Sig Dispense Refill    amLODIPine (NORVASC) 5 MG tablet 1 tablet Orally Once a day for 30 day(s)      aspirin (ECOTRIN) 81 MG EC tablet Take 81 mg by mouth once daily.      dulaglutide (TRULICITY SUBQ) Inject into the skin.      glimepiride (AMARYL) 2 MG tablet 1 tablet with breakfast or the first main meal of the day Orally Once a day for 30 day(s)      hydroCHLOROthiazide (HYDRODIURIL) 25 MG tablet 1 tablet in the morning Orally Once a day for 30 day(s)      losartan (COZAAR) 100 MG tablet 1 tablet Orally Once a day for 30 day(s)      metoprolol tartrate (LOPRESSOR) 50 MG tablet Take 50 mg by mouth 2 (two) times daily.      pravastatin (PRAVACHOL) 20 MG tablet Take 20 mg by mouth once daily.      tamsulosin (FLOMAX) 0.4 mg Cap TAKE 1 CAPSULE BY MOUTH ONCE DAILY 30 MINUTES AFTER THE SAME MEAL EACH DAY      cyclobenzaprine (FLEXERIL) 10 MG tablet Take 10 mg by mouth. (Patient not taking: Reported on 12/9/2024)      losartan (COZAAR) 100 MG tablet Take 100 mg by mouth once daily.      naloxone (NARCAN) 4 mg/actuation Spry SMARTSIG:Both Nares (Patient not taking: Reported on 12/9/2024)      oxyCODONE-acetaminophen (PERCOCET)  mg per tablet Take 1 tablet by mouth every 6 (six) hours as needed for Pain. for pain. (Patient not taking: Reported on 12/9/2024) 30 tablet 0    pantoprazole (PROTONIX) 40 MG tablet Take 40 mg by mouth once daily. (Patient not taking: Reported on 12/9/2024)      traMADoL (ULTRAM) 50 mg tablet Take 50 mg by mouth every 6 (six) hours as needed. (Patient not taking: Reported on 12/9/2024)       No current facility-administered medications on file prior to visit.      Review of Systems   Constitutional:  Positive for fatigue (better). Negative for activity change, appetite change, chills, diaphoresis, fever and unexpected weight change.   HENT:  Negative for nasal congestion, mouth  sores, nosebleeds, sinus pressure/congestion, sore throat and trouble swallowing.    Eyes: Negative.    Respiratory:  Positive for cough (chronic but better). Negative for shortness of breath.    Cardiovascular:  Negative for chest pain and palpitations.   Gastrointestinal:  Negative for abdominal distention, abdominal pain, blood in stool, change in bowel habit, constipation, diarrhea, nausea and vomiting.   Endocrine: Negative.    Genitourinary:  Negative for bladder incontinence, decreased urine volume, difficulty urinating, dysuria, frequency, hematuria and urgency.   Musculoskeletal:  Positive for back pain (same) and neck pain (same). Negative for arthralgias, gait problem, joint swelling, leg pain and myalgias.   Integumentary:  Negative for rash.   Allergic/Immunologic: Negative.    Neurological:  Negative for dizziness, tremors, syncope, weakness, light-headedness, numbness, headaches and memory loss.   Hematological:  Negative for adenopathy. Does not bruise/bleed easily.   Psychiatric/Behavioral:  Negative for agitation, confusion, hallucinations, sleep disturbance and suicidal ideas. The patient is not nervous/anxious.           Vitals:    12/09/24 1506   BP: (!) 143/89   BP Location: Left arm   Patient Position: Standing   Pulse: 72   Temp: 98.3 °F (36.8 °C)   TempSrc: Oral   SpO2: 96%   Weight: 102.2 kg (225 lb 6.4 oz)   Height: 6' (1.829 m)         Wt Readings from Last 6 Encounters:   12/09/24 102.2 kg (225 lb 6.4 oz)   10/07/24 103.4 kg (227 lb 14.4 oz)   09/23/24 103 kg (227 lb)   09/09/24 100.2 kg (220 lb 14.4 oz)   08/19/24 98.7 kg (217 lb 11.2 oz)   08/12/24 100.4 kg (221 lb 4.8 oz)        Body mass index is 30.57 kg/m².  Body surface area is 2.28 meters squared.  Physical Exam  Vitals and nursing note reviewed.   Constitutional:       General: He is not in acute distress.     Appearance: Normal appearance.   HENT:      Head: Normocephalic and atraumatic.      Mouth/Throat:      Mouth: Mucous  membranes are moist.   Eyes:      General: No scleral icterus.     Extraocular Movements: Extraocular movements intact.      Conjunctiva/sclera: Conjunctivae normal.   Neck:      Vascular: No JVD.   Cardiovascular:      Rate and Rhythm: Normal rate and regular rhythm.      Heart sounds: No murmur heard.  Pulmonary:      Effort: Pulmonary effort is normal.      Breath sounds: Decreased breath sounds present. No wheezing or rhonchi.   Chest:   Breasts:     Right: No swelling, bleeding, inverted nipple, mass, nipple discharge or skin change.      Left: Normal.   Abdominal:      General: Bowel sounds are normal. There is no distension.      Palpations: Abdomen is soft.      Tenderness: There is no abdominal tenderness.   Musculoskeletal:         General: No swelling or deformity.      Cervical back: Neck supple.   Lymphadenopathy:      Head:      Right side of head: No submandibular adenopathy.      Left side of head: No submandibular adenopathy.      Cervical: No cervical adenopathy.      Upper Body:      Right upper body: No supraclavicular or axillary adenopathy.      Left upper body: No supraclavicular or axillary adenopathy.      Lower Body: No right inguinal adenopathy. No left inguinal adenopathy.   Skin:     General: Skin is warm.      Coloration: Skin is not jaundiced.      Findings: No rash.   Neurological:      Mental Status: He is alert and oriented to person, place, and time.      Cranial Nerves: Cranial nerves 2-12 are intact.      Comments: Dropping on left side of his face. Left eye is more open than his right eye.    Psychiatric:         Attention and Perception: Attention normal.         Behavior: Behavior is cooperative.       ECOG SCORE    1 - Restricted in strenuous activity-ambulatory and able to carry out work of a light nature         Laboratory:  CBC with Differential:  Lab Results   Component Value Date    WBC 5.33 12/09/2024    RBC 4.19 (L) 12/09/2024    HGB 13.3 (L) 12/09/2024    HCT 40.4 (L)  12/09/2024    MCV 96.4 (H) 12/09/2024    MCH 31.7 (H) 12/09/2024    MCHC 32.9 (L) 12/09/2024    RDW 13.1 12/09/2024     12/09/2024    MPV 8.6 12/09/2024        CMP:  Sodium   Date Value Ref Range Status   10/07/2024 137 136 - 145 mmol/L Final     Potassium   Date Value Ref Range Status   10/07/2024 3.5 3.5 - 5.1 mmol/L Final     Chloride   Date Value Ref Range Status   10/07/2024 104 98 - 107 mmol/L Final     CO2   Date Value Ref Range Status   10/07/2024 24 23 - 31 mmol/L Final     Blood Urea Nitrogen   Date Value Ref Range Status   10/07/2024 7.9 (L) 8.4 - 25.7 mg/dL Final     Creatinine   Date Value Ref Range Status   10/07/2024 0.89 0.73 - 1.18 mg/dL Final     Calcium   Date Value Ref Range Status   10/07/2024 8.5 (L) 8.8 - 10.0 mg/dL Final     Albumin   Date Value Ref Range Status   10/07/2024 3.4 3.4 - 4.8 g/dL Final     Bilirubin Total   Date Value Ref Range Status   10/07/2024 0.6 <=1.5 mg/dL Final     ALP   Date Value Ref Range Status   10/07/2024 77 40 - 150 unit/L Final     AST   Date Value Ref Range Status   10/07/2024 7 5 - 34 unit/L Final     ALT   Date Value Ref Range Status   10/07/2024 <25 0 - 55 unit/L Final         Assessment:       1. Lung cancer, main bronchus, right    2. Squamous cell carcinoma of right lung    3. Abnormal findings on diagnostic imaging of other specified body structures    4. 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   -Restaging CT with GAURAV  -Surgical reevaluation -- no surgery recommended as restaging scan with GAURAV  -maintenance immunotherapy (Durvalumab)-11/15/2023-10/7/2024  -Restaging CT C/A/P 4/4/2024: level 4R paratracheal lymph node measures 9 x 9 mm, enlarged compared with 5 x 5 mm previously. The superior mediastinal paraesophageal adenopathy is  unchanged. The subcarinal lymphadenopathy is unchanged. The remaining mediastinal lymph nodes are unchanged. Slightly improved trace right pleural effusion.  -restaging CT Chest 10/3/2024: Stable areas of pleural thickening and scarring with and fibrosis in the right upper hemithorax. Stable right paratracheal/paraesophageal lymph node. Findings seen consistent with COPD and emphysema in the lungs  -PET-CT 12/06/2024: stable findings when compared to CT chest 10/03/2024, CT chest abdomen pelvis 06/28/2024, outside PET-CT 03/10/2023 .    2) Liver hypodensities--> suggesting cysts      Plan:       Patient patient is doing well and imaging studies relatively stable.  No overtly suspicious nodular area of uptake seen.  We will continue to monitor closely.        Continue follow ups with Dr. Hoff  PET CT in 3 months - Due 3/2025  RTC in 3 months with NP/MD for results  Labs: CBC, CMP, TSH  -  pt requests appointments @ 11 am or 1 pm  TSH pending today     Encouraged to call with questions or problems  The patient was given ample opportunity to ask questions and they were all answered to satisfaction; patient demonstrated understanding of what we discussed and is agreeable to the plan.    Letha Silver MD  Hematology/Oncology

## 2025-03-10 ENCOUNTER — HOSPITAL ENCOUNTER (OUTPATIENT)
Dept: RADIOLOGY | Facility: HOSPITAL | Age: 70
Discharge: HOME | End: 2025-03-10
Attending: INTERNAL MEDICINE
Payer: MEDICARE

## 2025-03-10 DIAGNOSIS — R93.89 ABNORMAL FINDINGS ON DIAGNOSTIC IMAGING OF OTHER SPECIFIED BODY STRUCTURES: ICD-10-CM

## 2025-03-10 DIAGNOSIS — C34.90 MALIGNANT NEOPLASM OF UNSPECIFIED PART OF UNSPECIFIED BRONCHUS OR LUNG: ICD-10-CM

## 2025-03-10 DIAGNOSIS — C34.91 SQUAMOUS CELL CARCINOMA OF RIGHT LUNG: ICD-10-CM

## 2025-03-10 DIAGNOSIS — C34.01 LUNG CANCER, MAIN BRONCHUS, RIGHT: ICD-10-CM

## 2025-03-10 LAB — POCT GLUCOSE: 128 MG/DL (ref 70–110)

## 2025-03-10 PROCEDURE — 78815 PET IMAGE W/CT SKULL-THIGH: CPT | Mod: TC

## 2025-03-10 PROCEDURE — A9552 F18 FDG: HCPCS | Performed by: INTERNAL MEDICINE

## 2025-03-10 RX ORDER — FLUDEOXYGLUCOSE F18 500 MCI/ML
10 INJECTION INTRAVENOUS
Status: COMPLETED | OUTPATIENT
Start: 2025-03-10 | End: 2025-03-10

## 2025-03-10 RX ADMIN — FLUDEOXYGLUCOSE F-18 10.2 MILLICURIE: 500 INJECTION INTRAVENOUS at 08:03

## 2025-03-14 NOTE — PROGRESS NOTES
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 Dx: 2/10/2023    Present treatment:  Observation    Treatment/Oncology history:  Carbo/Taxol + RT completed 7/12/2023  maintenance immunotherapy (Durvalumab)-11/15/2023-10/7/2024      Imaging:  PET-CT 03/10/2023:  Hypermetabolic mass in the right supra hilar region measuring 4.7 x 2.9 cm SUV of 20.8.  The mass abuts the right main pulmonary artery and right main stem bronchus.  Subcentimeter mediastinal lymph node noted.  One of LN slightly hypermetabolic with SUV of 3 and is located laterally.  Patchy hypermetabolism within the liver as suspected, however, there are few tiny focal areas of hypermetabolism out of proportion to the rest of the liver.  At least 1 of this corresponds to a low-density focus seen in the noncontrast exam.  CT 5/12/2023: changes consistent with COPD and emphysema in the lungs bilaterally with multiple bulla bleb seen. mass seen in the right hilum extending into the right upper lobe.  It is somewhat infiltrative.  This was seen on the prior PET-CT as well.  The findings are not significantly changed.  There are multiple associated satellite nodules in the right upper lobe.  Rough dimensions of the hilar component of the mass is 5.4 x 5.1 cm and rough measurements of the dominant lesion in the right upper lobe is 11 cm x 3.6 cm.  The mass extends into the right mainstem and right upper lobe bronchus.  This was seen on the prior examination as well.  There is narrowing of the right upper lobe bronchus there is some narrowing of the right upper lobe pulmonary arteries.  Findings are similar to the prior examination.  Scattered areas of punctate subcentimeter nodularity is seen in the right lower lobe.  These are too small to characterize and similar  changes were seen previously.    No pleural effusion is seen.  Thoracic aorta is normal in caliber.  There is some mediastinal lymphadenopathy seen.  Reference lymph node is measured in the precarinal region on image number 39 series 2 at 1.5 x 0.9 cm.  This is similar to the prior examination.  Scattered punctate areas of hypoattenuation are seen throughout the liver.  Similar changes were seen previously.  The lesions are too small to characterize but may represent cysts.  The largest lesion is seen in segment 4.  It does not enhance and measures 2 cm x 1.5 cm.  Findings may represent a cyst.  No adrenal nodules are seen.  CT Head  @ [facility]. Mild chronic white matter changes. Old left caudate lobe lesion. 2. No evidence of metastatic disease.  CT C/A/P 9/15/2023: Interval resolution of previously seen endobronchial right hilar mass. There are residual but improved patchy irregular opacities in the right upper lobe, predominantly peripherally located. Interval improvement of mediastinal lymph nodes. Unchanged hepatic cyst and too small to characterize hepatic hypodensities  CT Chest 2024: Similar small mediastinal lymph nodes and patchy irregular opacities in the right upper lobe. Trace right pleural fluid, similar to previous.  Bilateral diagnostic bilateral mammogram with right breast US 2024: BENIGN  CT C/A/P 2024: level 4R paratracheal lymph node measures 9 x 9 mm, enlarged compared with 5 x 5 mm previously. The superior mediastinal paraesophageal adenopathy is unchanged. The subcarinal lymphadenopathy is unchanged.   CT Chest 10/3/2024: Stable areas of pleural thickening and scarring with and fibrosis in the right upper hemithorax. Stable right paratracheal/paraesophageal LN  PET CT 2024: Small lymph node versus nodule at the deep margin of the left parotid lobe has a max SUV 4.7. Qualitatively similar to prior PET-CT.  There are post treatment changes with volume loss in the  right upper lobe.  Similar pleuroparenchymal opacities compared to prior diagnostic CT exam with mild diffuse FDG avidity, max SUV 4.1.   No discrete, focal nodular area of hypermetabolism. Reference right paratracheal lymph node has a max SUV of 3. Impression: There are post treatment changes at the right upper lobe with pleuroparenchymal opacities with mild uptake.  No overtly suspicious nodular area of uptake seen.  PET CT 03/10/2025: Treatment related scarring fibrosis right apex with volume loss, somewhat more consolidated on today's exam, no evidence of recurrent malignancy.      Pathology:  02/10/2023:  Right upper lobe lung needle biopsy: Scattered highly atypical squamous cells, suspicious for squamous cell carcinoma in a background of abundant acute inflammation and necrosis  Right upper lobe, brushing:  Positive for malignant cells.  Squamous cell carcinoma, moderately differentiated.  Right upper lobe biopsy squamous cell carcinoma, moderately differentiated with focal keratinization.  Right lung washings positive for malignant cells.  Squamous cell carcinoma in a background of abundant necrosis.      CLINICAL HISTORY:       Patient: Deondre Ocampo IV is a 69 y.o. male.  Kindly referred for newly diagnosed squamous cell carcinoma of the lung.    He reports that he was being evaluated for a right side pain went imaging studies showed a mass in the lung.  I do not have the CTs.  But he eventually underwent bronchoscopy that showed squamous cell carcinoma.  PET-CT showed the right suprahilar mass with possible mediastinal lymphadenopathy and questionable liver lesions.  He is here to discuss further recommendations.    He is here with his wife.  Patient has history of 53 pack year smoking.  He reports that his breathing is fine.  He denies any chest pain, short of breath or coughing.  No hemoptysis.  He has an appointment to see the radiation oncologist next week.    Chief Complaint:  Lung  cancer      Interval History:    He completed 6 cycle of weekly Carbo/Taxol on 7/12/23 along with XRT. He then has maintenance immunotherapy (Durvalumab)-11/15/2023-10/7/2024    12/9/2024: Patient presents today to discuss PET CT scan results accompanied by his wife.  He is doing good and voices no new concerns.  He admits still smokes.  No fever, chills, sweats.  No chest pain or short of breath.  He is still have chronic cough but is the same.  He also reports occasional runny nose due to allergies.  He does not take Claritin or any antihistaminic and is not willing to take them.  PET-CT with stable findings when compared to CT chest 10/03/2024, CT chest abdomen pelvis 06/28/2024, outside PET-CT 03/10/2023 .  Blood were discussed with the patient.    03/17/25: Patient presents today for 3 month follow up and PET CT results, accompanied by ***. PET CT done 03/10/25, Treatment related scarring fibrosis right apex with volume loss, somewhat more consolidated on today's exam, no evidence of recurrent malignancy. ***      Past Medical History:   Diagnosis Date    Diabetes mellitus     High cholesterol     Hypertension     Lung cancer       Past Surgical History:   Procedure Laterality Date    BACK SURGERY      DEBRIDEMENT TENNIS ELBOW Right     PERIPHERALLY INSERTED CENTRAL CATHETER INSERTION Left 5/29/2023    Procedure: Insertion-picc;  Surgeon: Letha Silver MD;  Location: [facility] OR;  Service: Oncology;  Laterality: Left;    PERIPHERALLY INSERTED CENTRAL CATHETER INSERTION Left 7/12/2024    Procedure: Insertion-picc;  Surgeon: Letha Silver MD;  Location: [facility] OR;  Service: Oncology;  Laterality: Left;    PERIPHERALLY INSERTED CENTRAL CATHETER INSERTION Left 10/4/2024    Procedure: Insertion-picc;  Surgeon: Leticia Addison FNP;  Location: [facility] OR;  Service: Medicine;  Laterality: Left;    right arm       Family History   Problem Relation Name Age of Onset    Coronary artery disease Mother      No Known  Problems Father      Leukemia Daughter  42    Coronary artery disease Brother      Bone cancer Brother  35    Diabetes Brother      Heart failure Brother      Diabetes Brother      Leukemia Brother      Breast cancer Neg Hx          neg fam history of breast cancer            Review of patient's allergies indicates:   Allergen Reactions    Ace inhibitors Swelling     Other reaction(s): Angioedema, Respiratory distress    Penicillins Itching and Rash      Current Outpatient Medications on File Prior to Visit   Medication Sig Dispense Refill    amLODIPine (NORVASC) 5 MG tablet 1 tablet Orally Once a day for 30 day(s)      aspirin (ECOTRIN) 81 MG EC tablet Take 81 mg by mouth once daily.      cyclobenzaprine (FLEXERIL) 10 MG tablet Take 10 mg by mouth. (Patient not taking: Reported on 12/9/2024)      dulaglutide (TRULICITY SUBQ) Inject into the skin.      glimepiride (AMARYL) 2 MG tablet 1 tablet with breakfast or the first main meal of the day Orally Once a day for 30 day(s)      hydroCHLOROthiazide (HYDRODIURIL) 25 MG tablet 1 tablet in the morning Orally Once a day for 30 day(s)      losartan (COZAAR) 100 MG tablet Take 100 mg by mouth once daily.      losartan (COZAAR) 100 MG tablet 1 tablet Orally Once a day for 30 day(s)      metoprolol tartrate (LOPRESSOR) 50 MG tablet Take 50 mg by mouth 2 (two) times daily.      naloxone (NARCAN) 4 mg/actuation Spry SMARTSIG:Both Nares (Patient not taking: Reported on 12/9/2024)      oxyCODONE-acetaminophen (PERCOCET)  mg per tablet Take 1 tablet by mouth every 6 (six) hours as needed for Pain. for pain. (Patient not taking: Reported on 12/9/2024) 30 tablet 0    pantoprazole (PROTONIX) 40 MG tablet Take 40 mg by mouth once daily. (Patient not taking: Reported on 12/9/2024)      pravastatin (PRAVACHOL) 20 MG tablet Take 20 mg by mouth once daily.      tamsulosin (FLOMAX) 0.4 mg Cap TAKE 1 CAPSULE BY MOUTH ONCE DAILY 30 MINUTES AFTER THE SAME MEAL EACH DAY      traMADoL  (ULTRAM) 50 mg tablet Take 50 mg by mouth every 6 (six) hours as needed. (Patient not taking: Reported on 12/9/2024)       No current facility-administered medications on file prior to visit.      Review of Systems   Constitutional:  Positive for fatigue (better). Negative for activity change, appetite change, chills, diaphoresis, fever and unexpected weight change.   HENT:  Negative for nasal congestion, mouth sores, nosebleeds, sinus pressure/congestion, sore throat and trouble swallowing.    Eyes: Negative.    Respiratory:  Positive for cough (chronic but better). Negative for shortness of breath.    Cardiovascular:  Negative for chest pain and palpitations.   Gastrointestinal:  Negative for abdominal distention, abdominal pain, blood in stool, change in bowel habit, constipation, diarrhea, nausea and vomiting.   Endocrine: Negative.    Genitourinary:  Negative for bladder incontinence, decreased urine volume, difficulty urinating, dysuria, frequency, hematuria and urgency.   Musculoskeletal:  Positive for back pain (same) and neck pain (same). Negative for arthralgias, gait problem, joint swelling, leg pain and myalgias.   Integumentary:  Negative for rash.   Allergic/Immunologic: Negative.    Neurological:  Negative for dizziness, tremors, syncope, weakness, light-headedness, numbness, headaches and memory loss.   Hematological:  Negative for adenopathy. Does not bruise/bleed easily.   Psychiatric/Behavioral:  Negative for agitation, confusion, hallucinations, sleep disturbance and suicidal ideas. The patient is not nervous/anxious.           There were no vitals filed for this visit.        Wt Readings from Last 6 Encounters:   12/09/24 102.2 kg (225 lb 6.4 oz)   10/07/24 103.4 kg (227 lb 14.4 oz)   09/23/24 103 kg (227 lb)   09/09/24 100.2 kg (220 lb 14.4 oz)   08/19/24 98.7 kg (217 lb 11.2 oz)   08/12/24 100.4 kg (221 lb 4.8 oz)        There is no height or weight on file to calculate BMI.  There is no  height or weight on file to calculate BSA.  Physical Exam  Vitals and nursing note reviewed.   Constitutional:       General: He is not in acute distress.     Appearance: Normal appearance.   HENT:      Head: Normocephalic and atraumatic.      Mouth/Throat:      Mouth: Mucous membranes are moist.   Eyes:      General: No scleral icterus.     Extraocular Movements: Extraocular movements intact.      Conjunctiva/sclera: Conjunctivae normal.   Neck:      Vascular: No JVD.   Cardiovascular:      Rate and Rhythm: Normal rate and regular rhythm.      Heart sounds: No murmur heard.  Pulmonary:      Effort: Pulmonary effort is normal.      Breath sounds: Decreased breath sounds present. No wheezing or rhonchi.   Chest:   Breasts:     Right: No swelling, bleeding, inverted nipple, mass, nipple discharge or skin change.      Left: Normal.   Abdominal:      General: Bowel sounds are normal. There is no distension.      Palpations: Abdomen is soft.      Tenderness: There is no abdominal tenderness.   Musculoskeletal:         General: No swelling or deformity.      Cervical back: Neck supple.   Lymphadenopathy:      Head:      Right side of head: No submandibular adenopathy.      Left side of head: No submandibular adenopathy.      Cervical: No cervical adenopathy.      Upper Body:      Right upper body: No supraclavicular or axillary adenopathy.      Left upper body: No supraclavicular or axillary adenopathy.      Lower Body: No right inguinal adenopathy. No left inguinal adenopathy.   Skin:     General: Skin is warm.      Coloration: Skin is not jaundiced.      Findings: No rash.   Neurological:      Mental Status: He is alert and oriented to person, place, and time.      Cranial Nerves: Cranial nerves 2-12 are intact.      Comments: Dropping on left side of his face. Left eye is more open than his right eye.    Psychiatric:         Attention and Perception: Attention normal.         Behavior: Behavior is cooperative.        ECOG SCORE             Laboratory:  CBC with Differential:  Lab Results   Component Value Date    WBC 5.33 12/09/2024    RBC 4.19 (L) 12/09/2024    HGB 13.3 (L) 12/09/2024    HCT 40.4 (L) 12/09/2024    MCV 96.4 (H) 12/09/2024    MCH 31.7 (H) 12/09/2024    MCHC 32.9 (L) 12/09/2024    RDW 13.1 12/09/2024     12/09/2024    MPV 8.6 12/09/2024        CMP:  Sodium   Date Value Ref Range Status   12/09/2024 137 136 - 145 mmol/L Final     Potassium   Date Value Ref Range Status   12/09/2024 3.6 3.5 - 5.1 mmol/L Final     Chloride   Date Value Ref Range Status   12/09/2024 105 98 - 107 mmol/L Final     CO2   Date Value Ref Range Status   12/09/2024 27 23 - 31 mmol/L Final     Blood Urea Nitrogen   Date Value Ref Range Status   12/09/2024 7.0 (L) 8.4 - 25.7 mg/dL Final     Creatinine   Date Value Ref Range Status   12/09/2024 1.10 0.72 - 1.25 mg/dL Final     Calcium   Date Value Ref Range Status   12/09/2024 9.1 8.8 - 10.0 mg/dL Final     Albumin   Date Value Ref Range Status   12/09/2024 3.4 3.4 - 4.8 g/dL Final     Bilirubin Total   Date Value Ref Range Status   12/09/2024 0.6 <=1.5 mg/dL Final     ALP   Date Value Ref Range Status   12/09/2024 76 40 - 150 unit/L Final     AST   Date Value Ref Range Status   12/09/2024 10 5 - 34 unit/L Final     ALT   Date Value Ref Range Status   12/09/2024 6 0 - 55 unit/L Final         Assessment:       No diagnosis found.    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   -Restaging CT with GAURAV  -Surgical reevaluation -- no surgery recommended as restaging scan with GAURAV  -maintenance immunotherapy (Durvalumab)-11/15/2023-10/7/2024  -Restaging CT C/A/P 4/4/2024: level 4R paratracheal lymph node measures 9 x 9 mm, enlarged compared with 5 x 5 mm previously. The superior mediastinal paraesophageal adenopathy is unchanged. The subcarinal  lymphadenopathy is unchanged. The remaining mediastinal lymph nodes are unchanged. Slightly improved trace right pleural effusion.  -restaging CT Chest 10/3/2024: Stable areas of pleural thickening and scarring with and fibrosis in the right upper hemithorax. Stable right paratracheal/paraesophageal lymph node. Findings seen consistent with COPD and emphysema in the lungs  -PET-CT 12/06/2024: stable findings when compared to CT chest 10/03/2024, CT chest abdomen pelvis 06/28/2024, outside PET-CT 03/10/2023 .    2) Liver hypodensities--> suggesting cysts      Plan:       Patient patient is doing well and imaging studies relatively stable.  No overtly suspicious nodular area of uptake seen.  We will continue to monitor closely.      12/09/24  Continue follow ups with Dr. Hoff  PET CT in 3 months - Due 3/2025  RTC in 3 months with NP/MD for results  Labs: CBC, CMP, TSH  -  pt requests appointments @ 11 am or 1 pm  TSH pending today     03/17/25  Continue follow ups with Dr. Hoff  PET CT in 3 months - Due 6/2025  ***  Encouraged to call with questions or problems  The patient was given ample opportunity to ask questions and they were all answered to satisfaction; patient demonstrated understanding of what we discussed and is agreeable to the plan.    Letha Silver MD  Hematology/Oncology      Professional Services   I, Darlyn Candelario LPN, acted solely as a scribe for and in the presence of Dr. Letha Silver, who performed these services.

## 2025-03-17 ENCOUNTER — OFFICE VISIT (OUTPATIENT)
Dept: HEMATOLOGY/ONCOLOGY | Facility: CLINIC | Age: 70
End: 2025-03-17
Payer: MEDICARE

## 2025-03-17 ENCOUNTER — LAB VISIT (OUTPATIENT)
Dept: LAB | Facility: HOSPITAL | Age: 70
End: 2025-03-17
Attending: INTERNAL MEDICINE
Payer: MEDICARE

## 2025-03-17 VITALS
DIASTOLIC BLOOD PRESSURE: 95 MMHG | SYSTOLIC BLOOD PRESSURE: 141 MMHG | HEART RATE: 74 BPM | WEIGHT: 225 LBS | OXYGEN SATURATION: 97 % | TEMPERATURE: 98 F | RESPIRATION RATE: 17 BRPM | BODY MASS INDEX: 30.48 KG/M2 | HEIGHT: 72 IN

## 2025-03-17 DIAGNOSIS — C34.01 LUNG CANCER, MAIN BRONCHUS, RIGHT: Primary | ICD-10-CM

## 2025-03-17 DIAGNOSIS — C34.91 SQUAMOUS CELL CARCINOMA OF RIGHT LUNG: ICD-10-CM

## 2025-03-17 DIAGNOSIS — E03.2 DRUG-INDUCED HYPOTHYROIDISM: ICD-10-CM

## 2025-03-17 DIAGNOSIS — C34.01 LUNG CANCER, MAIN BRONCHUS, RIGHT: ICD-10-CM

## 2025-03-17 DIAGNOSIS — K76.9 LIVER LESION: ICD-10-CM

## 2025-03-17 DIAGNOSIS — Z51.12 MAINTENANCE ANTINEOPLASTIC IMMUNOTHERAPY: ICD-10-CM

## 2025-03-17 DIAGNOSIS — R93.89 ABNORMAL FINDINGS ON DIAGNOSTIC IMAGING OF OTHER SPECIFIED BODY STRUCTURES: ICD-10-CM

## 2025-03-17 DIAGNOSIS — F17.200 SMOKER: ICD-10-CM

## 2025-03-17 DIAGNOSIS — R53.83 FATIGUE DUE TO TREATMENT: ICD-10-CM

## 2025-03-17 LAB
ALBUMIN SERPL-MCNC: 3.4 G/DL (ref 3.4–4.8)
ALBUMIN/GLOB SERPL: 0.8 RATIO (ref 1.1–2)
ALP SERPL-CCNC: 84 UNIT/L (ref 40–150)
ALT SERPL-CCNC: 8 UNIT/L (ref 0–55)
ANION GAP SERPL CALC-SCNC: 7 MEQ/L
AST SERPL-CCNC: 9 UNIT/L (ref 5–34)
BASOPHILS # BLD AUTO: 0.01 X10(3)/MCL
BASOPHILS NFR BLD AUTO: 0.1 %
BILIRUB SERPL-MCNC: 1.1 MG/DL
BUN SERPL-MCNC: 10 MG/DL (ref 8.4–25.7)
CALCIUM SERPL-MCNC: 9.1 MG/DL (ref 8.8–10)
CHLORIDE SERPL-SCNC: 103 MMOL/L (ref 98–107)
CO2 SERPL-SCNC: 27 MMOL/L (ref 23–31)
CREAT SERPL-MCNC: 0.9 MG/DL (ref 0.72–1.25)
CREAT/UREA NIT SERPL: 11
EOSINOPHIL # BLD AUTO: 0.04 X10(3)/MCL (ref 0–0.9)
EOSINOPHIL NFR BLD AUTO: 0.6 %
ERYTHROCYTE [DISTWIDTH] IN BLOOD BY AUTOMATED COUNT: 13.5 % (ref 11.5–17)
GFR SERPLBLD CREATININE-BSD FMLA CKD-EPI: >60 ML/MIN/1.73/M2
GLOBULIN SER-MCNC: 4.2 GM/DL (ref 2.4–3.5)
GLUCOSE SERPL-MCNC: 73 MG/DL (ref 82–115)
HCT VFR BLD AUTO: 41.6 % (ref 42–52)
HGB BLD-MCNC: 14 G/DL (ref 14–18)
IMM GRANULOCYTES # BLD AUTO: 0.02 X10(3)/MCL (ref 0–0.04)
IMM GRANULOCYTES NFR BLD AUTO: 0.3 %
LYMPHOCYTES # BLD AUTO: 1.03 X10(3)/MCL (ref 0.6–4.6)
LYMPHOCYTES NFR BLD AUTO: 14.3 %
MCH RBC QN AUTO: 32.4 PG (ref 27–31)
MCHC RBC AUTO-ENTMCNC: 33.7 G/DL (ref 33–36)
MCV RBC AUTO: 96.3 FL (ref 80–94)
MONOCYTES # BLD AUTO: 0.93 X10(3)/MCL (ref 0.1–1.3)
MONOCYTES NFR BLD AUTO: 12.9 %
NEUTROPHILS # BLD AUTO: 5.19 X10(3)/MCL (ref 2.1–9.2)
NEUTROPHILS NFR BLD AUTO: 71.8 %
PLATELET # BLD AUTO: 203 X10(3)/MCL (ref 130–400)
PMV BLD AUTO: 8.8 FL (ref 7.4–10.4)
POTASSIUM SERPL-SCNC: 3.6 MMOL/L (ref 3.5–5.1)
PROT SERPL-MCNC: 7.6 GM/DL (ref 5.8–7.6)
RBC # BLD AUTO: 4.32 X10(6)/MCL (ref 4.7–6.1)
SODIUM SERPL-SCNC: 137 MMOL/L (ref 136–145)
TSH SERPL-ACNC: 1.84 UIU/ML (ref 0.35–4.94)
WBC # BLD AUTO: 7.22 X10(3)/MCL (ref 4.5–11.5)

## 2025-03-17 PROCEDURE — 80053 COMPREHEN METABOLIC PANEL: CPT

## 2025-03-17 PROCEDURE — 36415 COLL VENOUS BLD VENIPUNCTURE: CPT

## 2025-03-17 PROCEDURE — 99999 PR PBB SHADOW E&M-EST. PATIENT-LVL V: CPT | Mod: PBBFAC,,, | Performed by: NURSE PRACTITIONER

## 2025-03-17 PROCEDURE — 84443 ASSAY THYROID STIM HORMONE: CPT

## 2025-03-17 PROCEDURE — 85025 COMPLETE CBC W/AUTO DIFF WBC: CPT

## 2025-03-17 RX ORDER — EPINEPHRINE 0.3 MG/.3ML
0.3 INJECTION SUBCUTANEOUS ONCE AS NEEDED
OUTPATIENT
Start: 2025-03-17

## 2025-03-17 RX ORDER — SODIUM CHLORIDE 0.9 % (FLUSH) 0.9 %
10 SYRINGE (ML) INJECTION
OUTPATIENT
Start: 2025-03-17

## 2025-03-17 RX ORDER — DIPHENHYDRAMINE HYDROCHLORIDE 50 MG/ML
50 INJECTION, SOLUTION INTRAMUSCULAR; INTRAVENOUS ONCE AS NEEDED
OUTPATIENT
Start: 2025-03-17

## 2025-03-17 RX ORDER — HEPARIN 100 UNIT/ML
500 SYRINGE INTRAVENOUS
OUTPATIENT
Start: 2025-03-17

## 2025-03-17 RX ORDER — FINASTERIDE 5 MG/1
5 TABLET, FILM COATED ORAL 2 TIMES DAILY
COMMUNITY
Start: 2025-03-04

## 2025-03-17 NOTE — PROGRESS NOTES
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 Dx: 2/10/2023    Present treatment:  Observation    Treatment/Oncology history:  Carbo/Taxol + RT completed 7/12/2023  maintenance immunotherapy (Durvalumab)-11/15/2023-10/7/2024      Imaging:  PET-CT 03/10/2023:  Hypermetabolic mass in the right supra hilar region measuring 4.7 x 2.9 cm SUV of 20.8.  The mass abuts the right main pulmonary artery and right main stem bronchus.  Subcentimeter mediastinal lymph node noted.  One of LN slightly hypermetabolic with SUV of 3 and is located laterally.  Patchy hypermetabolism within the liver as suspected, however, there are few tiny focal areas of hypermetabolism out of proportion to the rest of the liver.  At least 1 of this corresponds to a low-density focus seen in the noncontrast exam.  CT 5/12/2023: changes consistent with COPD and emphysema in the lungs bilaterally with multiple bulla bleb seen. mass seen in the right hilum extending into the right upper lobe.  It is somewhat infiltrative.  This was seen on the prior PET-CT as well.  The findings are not significantly changed.  There are multiple associated satellite nodules in the right upper lobe.  Rough dimensions of the hilar component of the mass is 5.4 x 5.1 cm and rough measurements of the dominant lesion in the right upper lobe is 11 cm x 3.6 cm.  The mass extends into the right mainstem and right upper lobe bronchus.  This was seen on the prior examination as well.  There is narrowing of the right upper lobe bronchus there is some narrowing of the right upper lobe pulmonary arteries.  Findings are similar to the prior examination.  Scattered areas of punctate subcentimeter nodularity is seen in the right lower lobe.  These are too small to characterize and similar  changes were seen previously.    No pleural effusion is seen.  Thoracic aorta is normal in caliber.  There is some mediastinal lymphadenopathy seen.  Reference lymph node is measured in the precarinal region on image number 39 series 2 at 1.5 x 0.9 cm.  This is similar to the prior examination.  Scattered punctate areas of hypoattenuation are seen throughout the liver.  Similar changes were seen previously.  The lesions are too small to characterize but may represent cysts.  The largest lesion is seen in segment 4.  It does not enhance and measures 2 cm x 1.5 cm.  Findings may represent a cyst.  No adrenal nodules are seen.  CT Head  @ [facility]. Mild chronic white matter changes. Old left caudate lobe lesion. 2. No evidence of metastatic disease.  CT C/A/P 9/15/2023: Interval resolution of previously seen endobronchial right hilar mass. There are residual but improved patchy irregular opacities in the right upper lobe, predominantly peripherally located. Interval improvement of mediastinal lymph nodes. Unchanged hepatic cyst and too small to characterize hepatic hypodensities  CT Chest 2024: Similar small mediastinal lymph nodes and patchy irregular opacities in the right upper lobe. Trace right pleural fluid, similar to previous.  Bilateral diagnostic bilateral mammogram with right breast US 2024: BENIGN  CT C/A/P 2024: level 4R paratracheal lymph node measures 9 x 9 mm, enlarged compared with 5 x 5 mm previously. The superior mediastinal paraesophageal adenopathy is unchanged. The subcarinal lymphadenopathy is unchanged.   CT Chest 10/3/2024: Stable areas of pleural thickening and scarring with and fibrosis in the right upper hemithorax. Stable right paratracheal/paraesophageal LN  PET CT 2024: Small lymph node versus nodule at the deep margin of the left parotid lobe has a max SUV 4.7. Qualitatively similar to prior PET-CT.  There are post treatment changes with volume loss in the  right upper lobe.  Similar pleuroparenchymal opacities compared to prior diagnostic CT exam with mild diffuse FDG avidity, max SUV 4.1.   No discrete, focal nodular area of hypermetabolism. Reference right paratracheal lymph node has a max SUV of 3. Impression: There are post treatment changes at the right upper lobe with pleuroparenchymal opacities with mild uptake.  No overtly suspicious nodular area of uptake seen.       Pathology:  02/10/2023:  Right upper lobe lung needle biopsy: Scattered highly atypical squamous cells, suspicious for squamous cell carcinoma in a background of abundant acute inflammation and necrosis  Right upper lobe, brushing:  Positive for malignant cells.  Squamous cell carcinoma, moderately differentiated.  Right upper lobe biopsy squamous cell carcinoma, moderately differentiated with focal keratinization.  Right lung washings positive for malignant cells.  Squamous cell carcinoma in a background of abundant necrosis.      CLINICAL HISTORY:       Patient: Deondre Ocampo IV is a 69 y.o. male.  Kindly referred for newly diagnosed squamous cell carcinoma of the lung.    He reports that he was being evaluated for a right side pain went imaging studies showed a mass in the lung.  I do not have the CTs.  But he eventually underwent bronchoscopy that showed squamous cell carcinoma.  PET-CT showed the right suprahilar mass with possible mediastinal lymphadenopathy and questionable liver lesions.  He is here to discuss further recommendations.    He is here with his wife.  Patient has history of 53 pack year smoking.  He reports that his breathing is fine.  He denies any chest pain, short of breath or coughing.  No hemoptysis.  He has an appointment to see the radiation oncologist next week.    Chief Complaint:  Lung cancer      Interval History:    Mr. Ocampo presents for follow-up visit. Reports feeling well with good energy and appetite.     No cough, hemoptysis. Reports occasional phlegm  with sinus drainage.     PET scan on 03/10/2025 negative for disease.     Labs reviewed: WBC, hemoglobin, platelets all within normal limits. Electrolytes, liver function tests, kidney function tests all normal.        Past Medical History:   Diagnosis Date    Diabetes mellitus     High cholesterol     Hypertension     Lung cancer       Past Surgical History:   Procedure Laterality Date    BACK SURGERY      DEBRIDEMENT TENNIS ELBOW Right     PERIPHERALLY INSERTED CENTRAL CATHETER INSERTION Left 5/29/2023    Procedure: Insertion-picc;  Surgeon: Letha Silver MD;  Location: [facility] OR;  Service: Oncology;  Laterality: Left;    PERIPHERALLY INSERTED CENTRAL CATHETER INSERTION Left 7/12/2024    Procedure: Insertion-picc;  Surgeon: Letha Silver MD;  Location: [facility] OR;  Service: Oncology;  Laterality: Left;    PERIPHERALLY INSERTED CENTRAL CATHETER INSERTION Left 10/4/2024    Procedure: Insertion-picc;  Surgeon: Leticia Addison FNP;  Location: [facility];  Service: Medicine;  Laterality: Left;    right arm       Family History   Problem Relation Name Age of Onset    Coronary artery disease Mother      No Known Problems Father      Leukemia Daughter  42    Coronary artery disease Brother      Bone cancer Brother  35    Diabetes Brother      Heart failure Brother      Diabetes Brother      Leukemia Brother      Breast cancer Neg Hx          neg fam history of breast cancer            Review of patient's allergies indicates:   Allergen Reactions    Ace inhibitors Swelling     Other reaction(s): Angioedema, Respiratory distress    Penicillins Itching and Rash      Current Outpatient Medications on File Prior to Visit   Medication Sig Dispense Refill    amLODIPine (NORVASC) 5 MG tablet 1 tablet Orally Once a day for 30 day(s)      aspirin (ECOTRIN) 81 MG EC tablet Take 81 mg by mouth once daily.      cyclobenzaprine (FLEXERIL) 10 MG tablet Take 10 mg by mouth.      dulaglutide (TRULICITY SUBQ) Inject into the skin.       finasteride (PROSCAR) 5 mg tablet Take 5 mg by mouth 2 (two) times a day.      glimepiride (AMARYL) 2 MG tablet 1 tablet with breakfast or the first main meal of the day Orally Once a day for 30 day(s)      losartan (COZAAR) 100 MG tablet Take 100 mg by mouth once daily.      metoprolol tartrate (LOPRESSOR) 50 MG tablet Take 50 mg by mouth 2 (two) times daily.      naloxone (NARCAN) 4 mg/actuation Spry       pantoprazole (PROTONIX) 40 MG tablet Take 40 mg by mouth once daily.      pravastatin (PRAVACHOL) 20 MG tablet Take 20 mg by mouth once daily.      hydroCHLOROthiazide (HYDRODIURIL) 25 MG tablet 1 tablet in the morning Orally Once a day for 30 day(s) (Patient not taking: Reported on 3/17/2025)      losartan (COZAAR) 100 MG tablet 1 tablet Orally Once a day for 30 day(s) (Patient not taking: Reported on 3/17/2025)      oxyCODONE-acetaminophen (PERCOCET)  mg per tablet Take 1 tablet by mouth every 6 (six) hours as needed for Pain. for pain. (Patient not taking: Reported on 3/17/2025) 30 tablet 0    tamsulosin (FLOMAX) 0.4 mg Cap TAKE 1 CAPSULE BY MOUTH ONCE DAILY 30 MINUTES AFTER THE SAME MEAL EACH DAY (Patient not taking: Reported on 3/17/2025)      traMADoL (ULTRAM) 50 mg tablet Take 50 mg by mouth every 6 (six) hours as needed. (Patient not taking: Reported on 3/17/2025)       No current facility-administered medications on file prior to visit.      Review of Systems   Constitutional:  Negative for activity change, appetite change, chills, diaphoresis, fatigue (better), fever and unexpected weight change.   HENT:  Negative for nasal congestion, mouth sores, nosebleeds, sinus pressure/congestion, sore throat and trouble swallowing.    Eyes: Negative.    Respiratory:  Negative for cough (chronic but better) and shortness of breath.    Cardiovascular:  Negative for chest pain and palpitations.   Gastrointestinal:  Negative for abdominal distention, abdominal pain, blood in stool, change in bowel habit,  constipation, diarrhea, nausea and vomiting.   Endocrine: Negative.    Genitourinary:  Negative for bladder incontinence, decreased urine volume, difficulty urinating, dysuria, frequency, hematuria and urgency.   Musculoskeletal:  Positive for back pain (chronic) and neck pain (same). Negative for arthralgias, gait problem, joint swelling, leg pain and myalgias.   Integumentary:  Negative for rash.   Allergic/Immunologic: Negative.    Neurological:  Negative for dizziness, tremors, syncope, weakness, light-headedness, numbness, headaches and memory loss.   Hematological:  Negative for adenopathy. Does not bruise/bleed easily.   Psychiatric/Behavioral:  Negative for agitation, confusion, hallucinations, sleep disturbance and suicidal ideas. The patient is not nervous/anxious.           Vitals:    03/17/25 1457   Weight: 102.1 kg (225 lb)         Wt Readings from Last 6 Encounters:   03/17/25 102.1 kg (225 lb)   12/09/24 102.2 kg (225 lb 6.4 oz)   10/07/24 103.4 kg (227 lb 14.4 oz)   09/23/24 103 kg (227 lb)   09/09/24 100.2 kg (220 lb 14.4 oz)   08/19/24 98.7 kg (217 lb 11.2 oz)        Body mass index is 30.52 kg/m².  Body surface area is 2.28 meters squared.  Physical Exam  Vitals and nursing note reviewed.   Constitutional:       General: He is not in acute distress.     Appearance: Normal appearance.   HENT:      Head: Normocephalic and atraumatic.      Mouth/Throat:      Mouth: Mucous membranes are moist.   Eyes:      General: No scleral icterus.     Extraocular Movements: Extraocular movements intact.      Conjunctiva/sclera: Conjunctivae normal.   Neck:      Vascular: No JVD.   Cardiovascular:      Rate and Rhythm: Normal rate and regular rhythm.      Heart sounds: No murmur heard.  Pulmonary:      Effort: Pulmonary effort is normal.      Breath sounds: No decreased breath sounds, wheezing or rhonchi.   Chest:   Breasts:     Right: No swelling, bleeding, inverted nipple, mass, nipple discharge or skin change.       Left: Normal.   Abdominal:      General: Bowel sounds are normal. There is no distension.      Palpations: Abdomen is soft.      Tenderness: There is no abdominal tenderness.   Musculoskeletal:         General: No swelling or deformity.      Cervical back: Neck supple.   Lymphadenopathy:      Head:      Right side of head: No submandibular adenopathy.      Left side of head: No submandibular adenopathy.      Cervical: No cervical adenopathy.      Upper Body:      Right upper body: No supraclavicular or axillary adenopathy.      Left upper body: No supraclavicular or axillary adenopathy.      Lower Body: No right inguinal adenopathy. No left inguinal adenopathy.   Skin:     General: Skin is warm.      Coloration: Skin is not jaundiced.      Findings: No rash.   Neurological:      Mental Status: He is alert and oriented to person, place, and time.      Cranial Nerves: Cranial nerves 2-12 are intact.      Comments: Dropping on left side of his face. Left eye is more open than his right eye.    Psychiatric:         Attention and Perception: Attention normal.         Behavior: Behavior is cooperative.       ECOG SCORE             Laboratory:  CBC with Differential:  Lab Results   Component Value Date    WBC 7.22 03/17/2025    RBC 4.32 (L) 03/17/2025    HGB 14.0 03/17/2025    HCT 41.6 (L) 03/17/2025    MCV 96.3 (H) 03/17/2025    MCH 32.4 (H) 03/17/2025    MCHC 33.7 03/17/2025    RDW 13.5 03/17/2025     03/17/2025    MPV 8.8 03/17/2025        CMP:  Sodium   Date Value Ref Range Status   12/09/2024 137 136 - 145 mmol/L Final     Potassium   Date Value Ref Range Status   12/09/2024 3.6 3.5 - 5.1 mmol/L Final     Chloride   Date Value Ref Range Status   12/09/2024 105 98 - 107 mmol/L Final     CO2   Date Value Ref Range Status   12/09/2024 27 23 - 31 mmol/L Final     Blood Urea Nitrogen   Date Value Ref Range Status   12/09/2024 7.0 (L) 8.4 - 25.7 mg/dL Final     Creatinine   Date Value Ref Range Status    12/09/2024 1.10 0.72 - 1.25 mg/dL Final     Calcium   Date Value Ref Range Status   12/09/2024 9.1 8.8 - 10.0 mg/dL Final     Albumin   Date Value Ref Range Status   12/09/2024 3.4 3.4 - 4.8 g/dL Final     Bilirubin Total   Date Value Ref Range Status   12/09/2024 0.6 <=1.5 mg/dL Final     ALP   Date Value Ref Range Status   12/09/2024 76 40 - 150 unit/L Final     AST   Date Value Ref Range Status   12/09/2024 10 5 - 34 unit/L Final     ALT   Date Value Ref Range Status   12/09/2024 6 0 - 55 unit/L Final         Assessment:       No diagnosis found.    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   -Restaging CT with GAURAV  -Surgical reevaluation -- no surgery recommended as restaging scan with GAURAV  -maintenance immunotherapy (Durvalumab)-11/15/2023-10/7/2024  -Restaging CT C/A/P 4/4/2024: level 4R paratracheal lymph node measures 9 x 9 mm, enlarged compared with 5 x 5 mm previously. The superior mediastinal paraesophageal adenopathy is unchanged. The subcarinal lymphadenopathy is unchanged. The remaining mediastinal lymph nodes are unchanged. Slightly improved trace right pleural effusion.  -restaging CT Chest 10/3/2024: Stable areas of pleural thickening and scarring with and fibrosis in the right upper hemithorax. Stable right paratracheal/paraesophageal lymph node. Findings seen consistent with COPD and emphysema in the lungs  -PET-CT 12/06/2024: stable findings when compared to CT chest 10/03/2024, CT chest abdomen pelvis 06/28/2024, outside PET-CT 03/10/2023 .    2) Liver hypodensities--> suggesting cysts      Plan:     Dx:cT4N2?Mx- Stage III Squamous cell carcinoma of the right upper lobe  Status: No evidence of disease   Continue surveillance  CBC, CMP, TSH, CEA for monitoring  Follow up in 6 months with repeat imaging and labs prior to visit   Dx:Back pain  Status:   Ongoing  Defer to Orthopedic/PCP  Patient is being managed with cyclobenzaprine and oxycodone  Dx:  Hypertension  Status:  Ongoing  Managed by PCP with the use of amlodipine 5 mg p.o. q.day.  Losartan 100 mg p.o. q.day, metoprolol 50 mg p.o. b.i.d.            Patient instructions and visit orders.       -Follow-up:  F/U with NP- 6 month  -Labs:  CBC, CMP, LDH, CEA, TSH-6 month, prior to the visit  -Imaging:  CT chest, abdomen, pelvis with contrast-six-month (prior to the next visit)  -Other orders:      38 were spent on this encounter    Duane Pierson, MSN, FNP-BC, APRN, AOCNP   Department of Hematology/Oncology.